# Patient Record
Sex: FEMALE | Race: WHITE | NOT HISPANIC OR LATINO | URBAN - METROPOLITAN AREA
[De-identification: names, ages, dates, MRNs, and addresses within clinical notes are randomized per-mention and may not be internally consistent; named-entity substitution may affect disease eponyms.]

---

## 2018-01-29 ENCOUNTER — INPATIENT (INPATIENT)
Facility: HOSPITAL | Age: 83
LOS: 1 days | Discharge: HOME | DRG: 690 | End: 2018-01-31
Attending: INTERNAL MEDICINE | Admitting: INTERNAL MEDICINE
Payer: MEDICARE

## 2018-01-29 VITALS
DIASTOLIC BLOOD PRESSURE: 53 MMHG | OXYGEN SATURATION: 96 % | HEART RATE: 66 BPM | RESPIRATION RATE: 16 BRPM | SYSTOLIC BLOOD PRESSURE: 146 MMHG

## 2018-01-29 DIAGNOSIS — N39.0 URINARY TRACT INFECTION, SITE NOT SPECIFIED: ICD-10-CM

## 2018-01-29 LAB
ALBUMIN SERPL ELPH-MCNC: 4.2 G/DL — SIGNIFICANT CHANGE UP (ref 3.3–5)
ALP SERPL-CCNC: 174 U/L — HIGH (ref 40–120)
ALT FLD-CCNC: 14 U/L RC — SIGNIFICANT CHANGE UP (ref 10–45)
ANION GAP SERPL CALC-SCNC: 15 MMOL/L — SIGNIFICANT CHANGE UP (ref 5–17)
APPEARANCE UR: ABNORMAL
APTT BLD: 31.5 SEC — SIGNIFICANT CHANGE UP (ref 27.5–37.4)
AST SERPL-CCNC: 20 U/L — SIGNIFICANT CHANGE UP (ref 10–40)
BACTERIA # UR AUTO: ABNORMAL /HPF
BASOPHILS # BLD AUTO: 0 K/UL — SIGNIFICANT CHANGE UP (ref 0–0.2)
BASOPHILS NFR BLD AUTO: 0.4 % — SIGNIFICANT CHANGE UP (ref 0–2)
BILIRUB SERPL-MCNC: 0.8 MG/DL — SIGNIFICANT CHANGE UP (ref 0.2–1.2)
BILIRUB UR-MCNC: NEGATIVE — SIGNIFICANT CHANGE UP
BUN SERPL-MCNC: 29 MG/DL — HIGH (ref 7–23)
CALCIUM SERPL-MCNC: 9.6 MG/DL — SIGNIFICANT CHANGE UP (ref 8.4–10.5)
CHLORIDE SERPL-SCNC: 102 MMOL/L — SIGNIFICANT CHANGE UP (ref 96–108)
CK MB CFR SERPL CALC: 2 NG/ML — SIGNIFICANT CHANGE UP (ref 0–3.8)
CK SERPL-CCNC: 65 U/L — SIGNIFICANT CHANGE UP (ref 25–170)
CO2 SERPL-SCNC: 27 MMOL/L — SIGNIFICANT CHANGE UP (ref 22–31)
COLOR SPEC: YELLOW — SIGNIFICANT CHANGE UP
CREAT SERPL-MCNC: 1.42 MG/DL — HIGH (ref 0.5–1.3)
DIFF PNL FLD: ABNORMAL
EOSINOPHIL # BLD AUTO: 0.1 K/UL — SIGNIFICANT CHANGE UP (ref 0–0.5)
EOSINOPHIL NFR BLD AUTO: 1.1 % — SIGNIFICANT CHANGE UP (ref 0–6)
EPI CELLS # UR: SIGNIFICANT CHANGE UP /HPF
GLUCOSE SERPL-MCNC: 86 MG/DL — SIGNIFICANT CHANGE UP (ref 70–99)
GLUCOSE UR QL: NEGATIVE — SIGNIFICANT CHANGE UP
HCT VFR BLD CALC: 37.9 % — SIGNIFICANT CHANGE UP (ref 34.5–45)
HGB BLD-MCNC: 13.5 G/DL — SIGNIFICANT CHANGE UP (ref 11.5–15.5)
INR BLD: 1.06 RATIO — SIGNIFICANT CHANGE UP (ref 0.88–1.16)
KETONES UR-MCNC: NEGATIVE — SIGNIFICANT CHANGE UP
LEUKOCYTE ESTERASE UR-ACNC: ABNORMAL
LYMPHOCYTES # BLD AUTO: 1.5 K/UL — SIGNIFICANT CHANGE UP (ref 1–3.3)
LYMPHOCYTES # BLD AUTO: 19.7 % — SIGNIFICANT CHANGE UP (ref 13–44)
MCHC RBC-ENTMCNC: 34.7 PG — HIGH (ref 27–34)
MCHC RBC-ENTMCNC: 35.6 GM/DL — SIGNIFICANT CHANGE UP (ref 32–36)
MCV RBC AUTO: 97.3 FL — SIGNIFICANT CHANGE UP (ref 80–100)
MONOCYTES # BLD AUTO: 0.6 K/UL — SIGNIFICANT CHANGE UP (ref 0–0.9)
MONOCYTES NFR BLD AUTO: 8.1 % — SIGNIFICANT CHANGE UP (ref 2–14)
NEUTROPHILS # BLD AUTO: 5.5 K/UL — SIGNIFICANT CHANGE UP (ref 1.8–7.4)
NEUTROPHILS NFR BLD AUTO: 70.6 % — SIGNIFICANT CHANGE UP (ref 43–77)
NITRITE UR-MCNC: POSITIVE
PH UR: 6 — SIGNIFICANT CHANGE UP (ref 5–8)
PLATELET # BLD AUTO: 248 K/UL — SIGNIFICANT CHANGE UP (ref 150–400)
POTASSIUM SERPL-MCNC: 4.4 MMOL/L — SIGNIFICANT CHANGE UP (ref 3.5–5.3)
POTASSIUM SERPL-SCNC: 4.4 MMOL/L — SIGNIFICANT CHANGE UP (ref 3.5–5.3)
PROT SERPL-MCNC: 7.8 G/DL — SIGNIFICANT CHANGE UP (ref 6–8.3)
PROT UR-MCNC: 30 MG/DL
PROTHROM AB SERPL-ACNC: 11.6 SEC — SIGNIFICANT CHANGE UP (ref 9.8–12.7)
RBC # BLD: 3.89 M/UL — SIGNIFICANT CHANGE UP (ref 3.8–5.2)
RBC # FLD: 12 % — SIGNIFICANT CHANGE UP (ref 10.3–14.5)
RBC CASTS # UR COMP ASSIST: ABNORMAL /HPF (ref 0–2)
SODIUM SERPL-SCNC: 144 MMOL/L — SIGNIFICANT CHANGE UP (ref 135–145)
SP GR SPEC: 1.02 — SIGNIFICANT CHANGE UP (ref 1.01–1.02)
TROPONIN T SERPL-MCNC: <0.01 NG/ML — SIGNIFICANT CHANGE UP (ref 0–0.06)
UROBILINOGEN FLD QL: NEGATIVE — SIGNIFICANT CHANGE UP
WBC # BLD: 7.8 K/UL — SIGNIFICANT CHANGE UP (ref 3.8–10.5)
WBC # FLD AUTO: 7.8 K/UL — SIGNIFICANT CHANGE UP (ref 3.8–10.5)
WBC UR QL: >50 /HPF (ref 0–5)

## 2018-01-29 PROCEDURE — 70450 CT HEAD/BRAIN W/O DYE: CPT | Mod: 26

## 2018-01-29 PROCEDURE — 93010 ELECTROCARDIOGRAM REPORT: CPT

## 2018-01-29 PROCEDURE — 99053 MED SERV 10PM-8AM 24 HR FAC: CPT

## 2018-01-29 PROCEDURE — 73630 X-RAY EXAM OF FOOT: CPT | Mod: 26,RT

## 2018-01-29 PROCEDURE — 99285 EMERGENCY DEPT VISIT HI MDM: CPT | Mod: 25

## 2018-01-29 PROCEDURE — 72170 X-RAY EXAM OF PELVIS: CPT | Mod: 26

## 2018-01-29 PROCEDURE — 71045 X-RAY EXAM CHEST 1 VIEW: CPT | Mod: 26

## 2018-01-29 RX ORDER — CEFTRIAXONE 500 MG/1
1 INJECTION, POWDER, FOR SOLUTION INTRAMUSCULAR; INTRAVENOUS EVERY 24 HOURS
Qty: 0 | Refills: 0 | Status: DISCONTINUED | OUTPATIENT
Start: 2018-01-29 | End: 2018-01-29

## 2018-01-29 RX ORDER — MIRTAZAPINE 45 MG/1
7.5 TABLET, ORALLY DISINTEGRATING ORAL AT BEDTIME
Qty: 0 | Refills: 0 | Status: DISCONTINUED | OUTPATIENT
Start: 2018-01-29 | End: 2018-01-31

## 2018-01-29 RX ORDER — ASPIRIN/CALCIUM CARB/MAGNESIUM 324 MG
0 TABLET ORAL
Qty: 0 | Refills: 0 | COMMUNITY

## 2018-01-29 RX ORDER — CARBIDOPA AND LEVODOPA 25; 100 MG/1; MG/1
1 TABLET ORAL
Qty: 0 | Refills: 0 | COMMUNITY

## 2018-01-29 RX ORDER — ASPIRIN/CALCIUM CARB/MAGNESIUM 324 MG
81 TABLET ORAL DAILY
Qty: 0 | Refills: 0 | Status: DISCONTINUED | OUTPATIENT
Start: 2018-01-29 | End: 2018-01-31

## 2018-01-29 RX ORDER — IPRATROPIUM/ALBUTEROL SULFATE 18-103MCG
0 AEROSOL WITH ADAPTER (GRAM) INHALATION
Qty: 0 | Refills: 0 | COMMUNITY

## 2018-01-29 RX ORDER — RIVAROXABAN 15 MG-20MG
0 KIT ORAL
Qty: 0 | Refills: 0 | COMMUNITY

## 2018-01-29 RX ORDER — CEFTRIAXONE 500 MG/1
1 INJECTION, POWDER, FOR SOLUTION INTRAMUSCULAR; INTRAVENOUS ONCE
Qty: 0 | Refills: 0 | Status: DISCONTINUED | OUTPATIENT
Start: 2018-01-29 | End: 2018-01-29

## 2018-01-29 RX ORDER — SODIUM CHLORIDE 9 MG/ML
500 INJECTION INTRAMUSCULAR; INTRAVENOUS; SUBCUTANEOUS ONCE
Qty: 0 | Refills: 0 | Status: COMPLETED | OUTPATIENT
Start: 2018-01-29 | End: 2018-01-29

## 2018-01-29 RX ORDER — CARBIDOPA AND LEVODOPA 25; 100 MG/1; MG/1
1 TABLET ORAL DAILY
Qty: 0 | Refills: 0 | Status: DISCONTINUED | OUTPATIENT
Start: 2018-01-29 | End: 2018-01-31

## 2018-01-29 RX ORDER — RIVAROXABAN 15 MG-20MG
15 KIT ORAL EVERY 24 HOURS
Qty: 0 | Refills: 0 | Status: DISCONTINUED | OUTPATIENT
Start: 2018-01-29 | End: 2018-01-31

## 2018-01-29 RX ORDER — CEFTRIAXONE 500 MG/1
1 INJECTION, POWDER, FOR SOLUTION INTRAMUSCULAR; INTRAVENOUS ONCE
Qty: 0 | Refills: 0 | Status: COMPLETED | OUTPATIENT
Start: 2018-01-29 | End: 2018-01-29

## 2018-01-29 RX ORDER — SIMVASTATIN 20 MG/1
0 TABLET, FILM COATED ORAL
Qty: 0 | Refills: 0 | COMMUNITY

## 2018-01-29 RX ORDER — CEFTRIAXONE 500 MG/1
1 INJECTION, POWDER, FOR SOLUTION INTRAMUSCULAR; INTRAVENOUS EVERY 24 HOURS
Qty: 0 | Refills: 0 | Status: DISCONTINUED | OUTPATIENT
Start: 2018-01-29 | End: 2018-01-31

## 2018-01-29 RX ORDER — DONEPEZIL HYDROCHLORIDE 10 MG/1
0 TABLET, FILM COATED ORAL
Qty: 0 | Refills: 0 | COMMUNITY

## 2018-01-29 RX ORDER — MULTIVIT-MIN/FERROUS GLUCONATE 9 MG/15 ML
1 LIQUID (ML) ORAL
Qty: 0 | Refills: 0 | COMMUNITY

## 2018-01-29 RX ORDER — SIMVASTATIN 20 MG/1
10 TABLET, FILM COATED ORAL AT BEDTIME
Qty: 0 | Refills: 0 | Status: DISCONTINUED | OUTPATIENT
Start: 2018-01-29 | End: 2018-01-31

## 2018-01-29 RX ORDER — ATENOLOL 25 MG/1
50 TABLET ORAL DAILY
Qty: 0 | Refills: 0 | Status: DISCONTINUED | OUTPATIENT
Start: 2018-01-29 | End: 2018-01-31

## 2018-01-29 RX ADMIN — SODIUM CHLORIDE 500 MILLILITER(S): 9 INJECTION INTRAMUSCULAR; INTRAVENOUS; SUBCUTANEOUS at 08:51

## 2018-01-29 RX ADMIN — SIMVASTATIN 10 MILLIGRAM(S): 20 TABLET, FILM COATED ORAL at 21:42

## 2018-01-29 RX ADMIN — MIRTAZAPINE 7.5 MILLIGRAM(S): 45 TABLET, ORALLY DISINTEGRATING ORAL at 22:55

## 2018-01-29 RX ADMIN — RIVAROXABAN 15 MILLIGRAM(S): KIT at 17:05

## 2018-01-29 RX ADMIN — CEFTRIAXONE 100 GRAM(S): 500 INJECTION, POWDER, FOR SOLUTION INTRAMUSCULAR; INTRAVENOUS at 08:51

## 2018-01-29 RX ADMIN — Medication 81 MILLIGRAM(S): at 10:40

## 2018-01-29 NOTE — ED PROVIDER NOTE - NEUROLOGICAL LEVEL OF CONSCIOUSNESS
CONFUSED/follows commands/alert eyes closed except for painful stimuli but answering questions./CONFUSED/follows commands/alert

## 2018-01-29 NOTE — ED ADULT NURSE NOTE - OBJECTIVE STATEMENT
84 yo female presents to ED by EMS for unwitnessed fall. EMS reports reveals that patient was found on the floor at [facility] facility. Patient has no recollection of events. Patient is oriented to person and place but disoriented to time and situation. On initial assessment she denies chest pain, neck pain, back pain, and pain in the joints and extremities. SpO2 >95% on room air. Abdomen is soft non tender non distended. Pelvis is stable. Urine is yellow and malodorous. Skin is warm and dry. Patient is afebrile rectally. There are  no apparent signs of head injury. Patient has bilateral strength and sensation in all extremities. There is a small amount of dried blood to the right tie and bilateral scabs to knees. There is blanchable redness to the sacrum. VSS/ NAD. Safety and comfort maintained. Glucose and EKG completed at bedside, WNL. Will continue to monitor.

## 2018-01-29 NOTE — ED PROVIDER NOTE - ATTENDING CONTRIBUTION TO CARE
MD Greene:  patient seen and evaluated with the resident.  I was present for key portions of the History & Physical, and I agree with the Impression & Plan.  MD Greene:  84 yo F, bib EMS after being found down at the [facility] between bed and wall.  Unk onset/down-time.  Patient AAOx3 at baseline by EMS reports, slow to respond today in ED, but following commands.  Quality:  sleepy-appearing.  Better/worse:  n/a.  Intensity:  7/10.  VS - wnl.  Physical Exam: elderly F, cachectic, NCAT, neck w/o midline ttp, CTA B, RRR, Abd:  s/nd/nt, Ext:  no edema, Skin:  +skin tear on R great toe.  ECG - unremarkable.  Impression:  possible fall on thrombin inhibitor, possible urosepsis, but normal VS.  No obvious significant injury.  Plan:  labs, Joshua, CT head, cxr, pelvis xr, ua, reassess.

## 2018-01-29 NOTE — H&P ADULT - NSHPREVIEWOFSYSTEMS_GEN_ALL_CORE
REVIEW OF SYSTEMS:    CONSTITUTIONA weakness, no  fevers or chills  EYES/ENT: No visual changes;    NECK: No pain   RESPIRATORY: No cough, wheezing, hemoptysis; No shortness of breath  CARDIOVASCULAR: No chest pain or palpitations  GASTROINTESTINAL: No abdominal or epigastric pain. No nausea, vomiting, or hematemesis; No diarrhea or constipation.       No  melena   ,  no      brbpr  GENITOURINARY: No dysuria, frequency   NEUROLOGICAL: No  focal  weakness  SKIN   No  rash  PSYCH    Alert

## 2018-01-29 NOTE — ED ADULT NURSE REASSESSMENT NOTE - NS ED NURSE REASSESS COMMENT FT1
patient is sleeping in bed comfortably. VSS/ NAD. Ceftriaxone infusion complete. Will continue to monitor.

## 2018-01-29 NOTE — ED PROVIDER NOTE - MEDICAL DECISION MAKING DETAILS
85 year old female past medical history A-fib on Xarelto, COPD, Parkinson's, constipation, presents to the ED for weakness. Increasing weakness. Unclear if fever prior but afebrile here. Found down by staff, with unknown downtime. Unknown if head trauma but no outward signs of trauma. Axox2, normally axox3. Will obtain labwork, ekg and cardiac enzymes to evaluate for cardiac etiology, UA for UTI as source of confusion, CT head for trauma, finger stick. Xray chest, pelvis and foot.

## 2018-01-29 NOTE — H&P ADULT - HISTORY OF PRESENT ILLNESS
85 year old female past medical history A-fib on xaralto, COPD, Parkinsons, constipation, presents to the ED for weakness. As per EMS patient has been weak for the past few days. Patient was found on floor today by staff this morning between her bed and the nightstand, sitting on her knees. Patient reports no pain but she does not remember falling. She is axox2, normally axox3 as per son after discussion on phone. seen in er, ct  head, no  cva    primary medical doctor: Lisbet Aden [phone]  Son: Jai [phone]

## 2018-01-29 NOTE — ED PROVIDER NOTE - PROGRESS NOTE DETAILS
discussed with Dr. Langford from Dr. Aden office, wanted Fallon for admission, fallon advised to admit to Dr. rahman.    Also discussed with son, who will be in hospital soon. Patient with improving mental status now less confused, opening eyes to verbal stimuli.

## 2018-01-29 NOTE — H&P ADULT - NSHPPHYSICALEXAM_GEN_ALL_CORE
PHYSICAL EXAMINATION:  Vital Signs Last 24 Hrs  T(C): 36.8 (29 Jan 2018 07:04), Max: 36.8 (29 Jan 2018 07:04)  T(F): 98.3 (29 Jan 2018 07:04), Max: 98.3 (29 Jan 2018 07:04)  HR: 51 (29 Jan 2018 09:00) (51 - 66)  BP: 131/50 (29 Jan 2018 09:00) (127/49 - 146/53)  BP(mean): --  RR: 15 (29 Jan 2018 09:00) (15 - 16)  SpO2: 98% (29 Jan 2018 09:00) (96% - 98%)  CAPILLARY BLOOD GLUCOSE      POCT Blood Glucose.: 93 mg/dL (29 Jan 2018 07:14)        GENERAL: NAD, well-groomed,  HEAD:  atraumatic, normocephalic  EYES: sclera anicteric  ENMT: mucous membranes moist  NECK: supple, No JVD  CHEST/LUNG: clear to auscultation bilaterally;    no      rales   , rhonchi,   HEART: normal S1, S2  ABDOMEN: BS+, soft, ND, NT   EXTREMITIES:  pulses palpable; no clubbing, cyanosis, or edema b/l LEs  NEURO: awake,  bit  confused  SKIN: no     rash

## 2018-01-29 NOTE — ED PROVIDER NOTE - OBJECTIVE STATEMENT
85 year old female past medical history A-fib on xaralto, COPD, Parkinsons, constipation, presents to the ED for weakness. As per EMS patient has been weak for the past few days. Patient was found on floor today by staff this morning between her bed and the nightstand, sitting on her knees. Patient reports no pain but she does not remember falling. She is axox2, normally axox3 as per son after discussion on phone.     primary medical doctor: Lisbet Aden [phone]  Son: Jai [phone] 85 year old female past medical history A-fib on xaralto, COPD, Parkinsons, constipation, presents to the ED for weakness. As per EMS patient has been weak for the past few days. Patient was found on floor today by staff this morning between her bed and the nightstand, sitting on her knees. Patient reports no pain but she does not remember falling. She is axox2, normally axox3 as per son after discussion on phone.     primary medical doctor: Lisbet Aden [phone]   Son: Jai [phone]

## 2018-01-29 NOTE — H&P ADULT - ASSESSMENT
pt  with  afib/ xarelto, dementia,   admitted  with a fall  had  generalized  weakness for past few  days   rvp, pending   now with uti, on rocephin   pt  eval  labs, stable pt  with  afib/ xarelto, dementia,   admitted  with a fall  had  generalized  weakness for past few  days   rvp, pending   now with uti, on rocephin   pt  eval  labs, stable  aide at bedside

## 2018-01-29 NOTE — H&P ADULT - NSHPLABSRESULTS_GEN_ALL_CORE
LABS:                        13.5   7.8   )-----------( 248      ( 2018 07:29 )             37.9         144  |  102  |  29<H>  ----------------------------<  86  4.4   |  27  |  1.42<H>    Ca    9.6      2018 07:29    TPro  7.8  /  Alb  4.2  /  TBili  0.8  /  DBili  x   /  AST  20  /  ALT  14  /  AlkPhos  174<H>      PT/INR - ( 2018 07:29 )   PT: 11.6 sec;   INR: 1.06 ratio         PTT - ( 2018 07:29 )  PTT:31.5 sec  Urinalysis Basic - ( 2018 07:29 )    Color: Yellow / Appearance: SL Turbid / S.019 / pH: x  Gluc: x / Ketone: Negative  / Bili: Negative / Urobili: Negative   Blood: x / Protein: 30 mg/dL / Nitrite: Positive   Leuk Esterase: Large / RBC: 10-25 /HPF / WBC >50 /HPF   Sq Epi: x / Non Sq Epi: Occasional /HPF / Bacteria: Moderate /HPF          RADIOLOGY & ADDITIONAL TESTS:

## 2018-01-30 ENCOUNTER — TRANSCRIPTION ENCOUNTER (OUTPATIENT)
Age: 83
End: 2018-01-30

## 2018-01-30 LAB
ANION GAP SERPL CALC-SCNC: 14 MMOL/L — SIGNIFICANT CHANGE UP (ref 5–17)
BUN SERPL-MCNC: 28 MG/DL — HIGH (ref 7–23)
CALCIUM SERPL-MCNC: 9.3 MG/DL — SIGNIFICANT CHANGE UP (ref 8.4–10.5)
CHLORIDE SERPL-SCNC: 108 MMOL/L — SIGNIFICANT CHANGE UP (ref 96–108)
CO2 SERPL-SCNC: 22 MMOL/L — SIGNIFICANT CHANGE UP (ref 22–31)
CREAT SERPL-MCNC: 1.2 MG/DL — SIGNIFICANT CHANGE UP (ref 0.5–1.3)
GLUCOSE SERPL-MCNC: 84 MG/DL — SIGNIFICANT CHANGE UP (ref 70–99)
HCT VFR BLD CALC: 39.2 % — SIGNIFICANT CHANGE UP (ref 34.5–45)
HGB BLD-MCNC: 13 G/DL — SIGNIFICANT CHANGE UP (ref 11.5–15.5)
MAGNESIUM SERPL-MCNC: 2.1 MG/DL — SIGNIFICANT CHANGE UP (ref 1.6–2.6)
MCHC RBC-ENTMCNC: 32.1 PG — SIGNIFICANT CHANGE UP (ref 27–34)
MCHC RBC-ENTMCNC: 33.2 GM/DL — SIGNIFICANT CHANGE UP (ref 32–36)
MCV RBC AUTO: 96.8 FL — SIGNIFICANT CHANGE UP (ref 80–100)
PLATELET # BLD AUTO: 220 K/UL — SIGNIFICANT CHANGE UP (ref 150–400)
POTASSIUM SERPL-MCNC: 5.2 MMOL/L — SIGNIFICANT CHANGE UP (ref 3.5–5.3)
POTASSIUM SERPL-SCNC: 5.2 MMOL/L — SIGNIFICANT CHANGE UP (ref 3.5–5.3)
RBC # BLD: 4.05 M/UL — SIGNIFICANT CHANGE UP (ref 3.8–5.2)
RBC # FLD: 14.8 % — HIGH (ref 10.3–14.5)
SODIUM SERPL-SCNC: 144 MMOL/L — SIGNIFICANT CHANGE UP (ref 135–145)
WBC # BLD: 8.07 K/UL — SIGNIFICANT CHANGE UP (ref 3.8–10.5)
WBC # FLD AUTO: 8.07 K/UL — SIGNIFICANT CHANGE UP (ref 3.8–10.5)

## 2018-01-30 RX ADMIN — SIMVASTATIN 10 MILLIGRAM(S): 20 TABLET, FILM COATED ORAL at 22:12

## 2018-01-30 RX ADMIN — CEFTRIAXONE 100 GRAM(S): 500 INJECTION, POWDER, FOR SOLUTION INTRAMUSCULAR; INTRAVENOUS at 09:09

## 2018-01-30 RX ADMIN — MIRTAZAPINE 7.5 MILLIGRAM(S): 45 TABLET, ORALLY DISINTEGRATING ORAL at 22:12

## 2018-01-30 RX ADMIN — RIVAROXABAN 15 MILLIGRAM(S): KIT at 19:04

## 2018-01-30 RX ADMIN — ATENOLOL 50 MILLIGRAM(S): 25 TABLET ORAL at 06:45

## 2018-01-30 RX ADMIN — Medication 81 MILLIGRAM(S): at 17:51

## 2018-01-30 RX ADMIN — CARBIDOPA AND LEVODOPA 1 TABLET(S): 25; 100 TABLET ORAL at 17:51

## 2018-01-30 NOTE — DISCHARGE NOTE ADULT - HOSPITAL COURSE
85 year old female past medical history A-fib on xarelto, COPD, Parkinsons, constipation, presents to the ED for weakness. As per EMS, patient has been weak for the past few days. Patient was found on floor today by staff this morning between her bed and the night stand, sitting on her knees.  Patient reports no pain but she does not remember falling. She is axox2, normally axox3 as per son after discussion on phone.    1/29/18 - Head CT - No evidence of acute hemorrhage, mass or mass effect.                Xray pelvis - No acute fracture is demonstrated                Xray right foot - No acute fracture or radiopaque retained foreign body                Chest Xray - The heart is slightly enlarged. The lungs are clear. The visualized bones are normal for the patient's age. 85 year old female past medical history A-fib on xarelto, COPD, Parkinsons, constipation, presents to the ED for weakness. As per EMS, patient has been weak for the past few days. Patient was found on floor today by staff this morning between her bed and the night stand, sitting on her knees.  Patient reports no pain but she does not remember falling. She is axox2, normally axox3 as per son after discussion on phone.    1/29/18 - Head CT - No evidence of acute hemorrhage, mass or mass effect.                Xray pelvis - No acute fracture is demonstrated                Xray right foot - No acute fracture or radiopaque retained foreign body                Chest Xray - The heart is slightly enlarged. The lungs are clear. The visualized bones are normal for the patient's age.                Urinalysis - positive                Urine culture - >100,000 E.Coli 85 year old female past medical history A-fib on xarelto, COPD, Parkinsons, constipation, presents to the ED for weakness. As per EMS, patient has been weak for the past few days. Patient was found on floor today by staff this morning between her bed and the night stand, sitting on her knees.  Patient reports no pain but she does not remember falling. She is axox2, normally axox3 as per son after discussion on phone.    1/29/18 - Head CT - No evidence of acute hemorrhage, mass or mass effect.                Xray pelvis - No acute fracture is demonstrated                Xray right foot - No acute fracture or radiopaque retained foreign body                Chest Xray - The heart is slightly enlarged. The lungs are clear. The visualized bones are normal for the patient's age.                Urinalysis - positive                Urine culture - >100,000 E.Coli  1/31/18 - Patient ready for discharge with Cipro x one more day and follow up with PMD as outpatient.

## 2018-01-30 NOTE — DISCHARGE NOTE ADULT - PLAN OF CARE
Symptoms improved You are being discharged on an antibiotic (Cipro) x one more day.  HOME CARE INSTRUCTIONS  If you were prescribed antibiotics, take them exactly as your caregiver instructs you. Finish the medication even if you feel better after you have only taken some of the medication.  Drink enough water and fluids to keep your urine clear or pale yellow.  Avoid caffeine, tea, and carbonated beverages. They tend to irritate your bladder.  Empty your bladder often. Avoid holding urine for long periods of time.  Empty your bladder before and after sexual intercourse.  After a bowel movement, women should cleanse from front to back. Use each tissue only once.  SEEK MEDICAL CARE IF:  You have back pain.  You develop a fever.  Your symptoms do not begin to resolve within 3 days.  SEEK IMMEDIATE MEDICAL CARE IF:  You have severe back pain or lower abdominal pain.  You develop chills.  You have nausea or vomiting.  You have continued burning or discomfort with urination. Fall precautions.  Continue with physical therapy, as patient was receiving prior to admission. Atrial fibrillation is the most common heart rhythm problem.  The condition puts you at risk for has stroke and heart attack  It helps if you control your blood pressure, not drink more than 1-2 alcohol drinks per day, cut down on caffeine, getting treatment for over active thyroid gland, and get regular exercise  Call your doctor if you feel your heart racing or beating unusually, chest tightness or pain, lightheaded, faint, shortness of breath especially with exercise  It is important to take your heart medication as prescribed  You may be on anticoagulation which is very important to take as directed - you may need blood work to monitor drug levels You will need to follow up with your primary medical doctor within one week of discharge.  Continue with home medication regimen. Continue with current medication regimen.

## 2018-01-30 NOTE — PROGRESS NOTE ADULT - ASSESSMENT
pt  with  afib/ xarelto, dementia, parjinsons   admitted  with a fall, no  syncope  had  generalized  weakness for past few  days   now with uti, on rocephin  ID called   pt  eval  labs, stable  pt eval, pending

## 2018-01-30 NOTE — PROGRESS NOTE ADULT - SUBJECTIVE AND OBJECTIVE BOX
resting, no  cp/sob/ abd pain    MEDICATIONS  (STANDING):  aspirin enteric coated 81 milliGRAM(s) Oral daily  ATENolol  Tablet 50 milliGRAM(s) Oral daily  carbidopa/levodopa  25/250 1 Tablet(s) Oral daily  cefTRIAXone   IVPB 1 Gram(s) IV Intermittent every 24 hours  mirtazapine 7.5 milliGRAM(s) Oral at bedtime  rivaroxaban 15 milliGRAM(s) Oral every 24 hours  simvastatin 10 milliGRAM(s) Oral at bedtime    MEDICATIONS  (PRN):      Vital Signs Last 24 Hrs  T(C): 36.4 (2018 03:09), Max: 37.2 (2018 21:55)  T(F): 97.6 (2018 03:09), Max: 99 (2018 21:55)  HR: 71 (2018 03:09) (56 - 71)  BP: 166/75 (2018 03:09) (99/51 - 166/75)  BP(mean): --  RR: 18 (2018 03:09) (17 - 18)  SpO2: 100% (2018 03:09) (92% - 100%)  CAPILLARY BLOOD GLUCOSE        I&O's Summary    2018 07:01  -  2018 12:07  --------------------------------------------------------  IN: 50 mL / OUT: 0 mL / NET: 50 mL        PHYSICAL EXAM:  HEAD:  Atraumatic, Normocephalic  NECK: Supple, No JVD  CHEST/LUNG: Clear to auscultation bilaterally; No wheeze  HEART: Regular rate and rhythm;           murmur  ABDOMEN: Soft, Nontender, ;   EXTREMITIES:              edema  NEUROLOGY:  alert    LABS:                        13.0   8.07  )-----------( 220      ( 2018 09:07 )             39.2         144  |  108  |  28<H>  ----------------------------<  84  5.2   |  22  |  1.20    Ca    9.3      2018 09:03  Mg     2.1     01-30    TPro  7.8  /  Alb  4.2  /  TBili  0.8  /  DBili  x   /  AST  20  /  ALT  14  /  AlkPhos  174<H>      PT/INR - ( 2018 07:29 )   PT: 11.6 sec;   INR: 1.06 ratio         PTT - ( 2018 07:29 )  PTT:31.5 sec  CARDIAC MARKERS ( 2018 07:29 )  x     / <0.01 ng/mL / 65 U/L / x     / 2.0 ng/mL      Urinalysis Basic - ( 2018 07:29 )    Color: Yellow / Appearance: SL Turbid / S.019 / pH: x  Gluc: x / Ketone: Negative  / Bili: Negative / Urobili: Negative   Blood: x / Protein: 30 mg/dL / Nitrite: Positive   Leuk Esterase: Large / RBC: 10-25 /HPF / WBC >50 /HPF   Sq Epi: x / Non Sq Epi: Occasional /HPF / Bacteria: Moderate /HPF                  Consultant(s) Notes Reviewed:      Care Discussed with Consultants/Other Providers:

## 2018-01-30 NOTE — DISCHARGE NOTE ADULT - CARE PLAN
Principal Discharge DX:	UTI (urinary tract infection)  Goal:	Symptoms improved  Assessment and plan of treatment:	You are being discharged on an antibiotic (Cipro) x one more day.  HOME CARE INSTRUCTIONS  If you were prescribed antibiotics, take them exactly as your caregiver instructs you. Finish the medication even if you feel better after you have only taken some of the medication.  Drink enough water and fluids to keep your urine clear or pale yellow.  Avoid caffeine, tea, and carbonated beverages. They tend to irritate your bladder.  Empty your bladder often. Avoid holding urine for long periods of time.  Empty your bladder before and after sexual intercourse.  After a bowel movement, women should cleanse from front to back. Use each tissue only once.  SEEK MEDICAL CARE IF:  You have back pain.  You develop a fever.  Your symptoms do not begin to resolve within 3 days.  SEEK IMMEDIATE MEDICAL CARE IF:  You have severe back pain or lower abdominal pain.  You develop chills.  You have nausea or vomiting.  You have continued burning or discomfort with urination.  Secondary Diagnosis:	Fall  Assessment and plan of treatment:	Fall precautions.  Continue with physical therapy, as patient was receiving prior to admission.  Secondary Diagnosis:	Afib  Assessment and plan of treatment:	Atrial fibrillation is the most common heart rhythm problem.  The condition puts you at risk for has stroke and heart attack  It helps if you control your blood pressure, not drink more than 1-2 alcohol drinks per day, cut down on caffeine, getting treatment for over active thyroid gland, and get regular exercise  Call your doctor if you feel your heart racing or beating unusually, chest tightness or pain, lightheaded, faint, shortness of breath especially with exercise  It is important to take your heart medication as prescribed  You may be on anticoagulation which is very important to take as directed - you may need blood work to monitor drug levels  Secondary Diagnosis:	COPD (chronic obstructive pulmonary disease)  Assessment and plan of treatment:	You will need to follow up with your primary medical doctor within one week of discharge.  Continue with home medication regimen.  Secondary Diagnosis:	Parkinsons disease  Assessment and plan of treatment:	Continue with current medication regimen.

## 2018-01-30 NOTE — CONSULT NOTE ADULT - SUBJECTIVE AND OBJECTIVE BOX
Patient is a 85y old  Female who presents with a chief complaint of weakness/fall (2018 09:40)      HPI:  85 year old female past medical history A-fib on xaralto, COPD, Parkinsons, constipation, presents to the ED for weakness. As per EMS patient has been weak for the past few days. Patient was found on floor today by staff this morning between her bed and the nightstand, sitting on her knees. Patient reports no pain but she does not remember falling. She is axox2, normally axox3 as per son after discussion on phone. seen in er, ct  head, no  cva    primary medical doctor: Lisbet Aden [phone]  Son: Jai [phone] (2018 09:40)           *****PAST MEDICAL / Surgical  HISTORY:  PAST MEDICAL & SURGICAL HISTORY:           *****FAMILY HISTORY:  FAMILY HISTORY:           *****SOCIAL HISTORY:  Alcohol: None  Smoking: None         *****ALLERGIES:   Allergies    No Known Allergies    Intolerances             *****MEDICATIONS:  MEDICATIONS  (STANDING):  aspirin enteric coated 81 milliGRAM(s) Oral daily  ATENolol  Tablet 50 milliGRAM(s) Oral daily  carbidopa/levodopa  25/250 1 Tablet(s) Oral daily  cefTRIAXone   IVPB 1 Gram(s) IV Intermittent every 24 hours  mirtazapine 7.5 milliGRAM(s) Oral at bedtime  rivaroxaban 15 milliGRAM(s) Oral every 24 hours  simvastatin 10 milliGRAM(s) Oral at bedtime    MEDICATIONS  (PRN):           *****REVIEW OF SYSTEM:  GEN: no fever, no chills, no pain  RESP: no SOB, no cough, no sputum  CVS: no chest pain, no palpitations, no edema  GI: no abdominal pain, no nausea, no vomiting, no constipation, no diarrhea  : no dysurea, no frequency, no hematurea  Neuro: no headache, no dizziness  PSYCH: no anxiety, no depression  Derm : no itching, no rash         *****VITAL SIGNS:  T(C): 36.4 (18 @ 03:09), Max: 37.2 (18 @ 21:55)  HR: 71 (18 @ 03:09) (56 - 71)  BP: 166/75 (18 @ 03:09) (99/51 - 166/75)  RR: 18 (18 @ 03:09) (16 - 18)  SpO2: 100% (18 @ 03:09) (92% - 100%)  Wt(kg): --           *****PHYSICAL EXAM:  GEN: A&O X 3 , NAD , comfortable  HEENT: NCAT, PERRL, MMM, hearing intact  Neck: supple , no JVD  CVS: S1S2 , regular , No M/R/G appreciated  PULM: CTA B/L,  no W/R/R appreciated  ABD.: soft. non tender, non distended,  bowel sounds present  Extrem: intact pulses , no edema   Derm: No rash , no ecchymoses  PSYCH : normal mood,  no delusion not anxious         *****LAB AND IMAGIN.0   8.07  )-----------( 220      ( 2018 09:07 )             39.2                   144  |  108  |  28<H>  ----------------------------<  84  5.2   |  22  |  1.20    Ca    9.3      2018 09:03  Mg     2.1         TPro  7.8  /  Alb  4.2  /  TBili  0.8  /  DBili  x   /  AST  20  /  ALT  14  /  AlkPhos  174<H>      PT/INR - ( 2018 07:29 )   PT: 11.6 sec;   INR: 1.06 ratio         PTT - ( 2018 07:29 )  PTT:31.5 sec            CARDIAC MARKERS ( 2018 07:29 )  x     / <0.01 ng/mL / 65 U/L / x     / 2.0 ng/mL              Urinalysis Basic - ( 2018 07:29 )    Color: Yellow / Appearance: SL Turbid / S.019 / pH: x  Gluc: x / Ketone: Negative  / Bili: Negative / Urobili: Negative   Blood: x / Protein: 30 mg/dL / Nitrite: Positive   Leuk Esterase: Large / RBC: 10-25 /HPF / WBC >50 /HPF   Sq Epi: x / Non Sq Epi: Occasional /HPF / Bacteria: Moderate /HPF        [All pertinent recent Imaging reports reviewed]         *****A S S E S S M E N T   A N D   P L A N :  85F with past medical history A-fib on xaralto, COPD, Parkinson's who presented with fall, gen weakness.  on abx for uti  HR/BP stable  if becomes tachy consider tele  PT eval  cont AC  titrate BB as needed for HR control  echo  ___________________________  Will follow with you.  Thank you,  ROWDY Jain D.O.
HPI:   Patient is a 85y female with dementia, lives home with aide who was admitted 1 day ago because she fell in her bedroom via unknown mechanism and by report had been weak for a few days prior to admission. She has no fever. She cannot give any information but per aid she is at baseline ms  All other review of systems cannot be obtained    PAST MEDICAL & SURGICAL HISTORY:  dementia  cad  copd,   afib  parkinson    Allergies    No Known Allergies    Intolerances        Antimicrobials Day #  :3  cefTRIAXone   IVPB 1 Gram(s) IV Intermittent every 24 hours    Other Medications:  aspirin enteric coated 81 milliGRAM(s) Oral daily  ATENolol  Tablet 50 milliGRAM(s) Oral daily  carbidopa/levodopa  25/250 1 Tablet(s) Oral daily  mirtazapine 7.5 milliGRAM(s) Oral at bedtime  rivaroxaban 15 milliGRAM(s) Oral every 24 hours  simvastatin 10 milliGRAM(s) Oral at bedtime      FAMILY HISTORY:      SOCIAL HISTORY:  Smoking: [ ]Yes [ x]No  ETOH: [ ]Yes [ x]No  Drug Use: [ ]Yes [ ]xNo   [ x] Single[ ]    T(F): 97.5 (18 @ 14:00), Max: 99 (18 @ 21:55)  HR: 59 (18 @ 14:00)  BP: 116/64 (18 @ 14:00)  RR: 18 (18 @ 14:00)  SpO2: 95% (18 @ 14:00)  Wt(kg): --    PHYSICAL EXAM:  General: sleeping no acute distress  Eyes:  anicteric, no conjunctival injection, no discharge  Oropharynx: no lesions or injection 	  Neck: supple, without adenopathy  Lungs: clear to auscultation  Heart: s1s2 2/6 sys m  Abdomen: soft, nondistended, nontender, without mass or organomegaly  Skin: no lesions  Extremities: no clubbing, cyanosis, or edema  Neurologic: anderson stallings     LAB RESULTS:                        13.0   8.07  )-----------( 220      ( 2018 09:07 )             39.2         144  |  108  |  28<H>  ----------------------------<  84  5.2   |  22  |  1.20    Ca    9.3      2018 09:03  Mg     2.1         TPro  7.8  /  Alb  4.2  /  TBili  0.8  /  DBili  x   /  AST  20  /  ALT  14  /  AlkPhos  174<H>      LIVER FUNCTIONS - ( 2018 07:29 )  Alb: 4.2 g/dL / Pro: 7.8 g/dL / ALK PHOS: 174 U/L / ALT: 14 U/L RC / AST: 20 U/L / GGT: x           Urinalysis Basic - ( 2018 07:29 )    Color: Yellow / Appearance: SL Turbid / S.019 / pH: x  Gluc: x / Ketone: Negative  / Bili: Negative / Urobili: Negative   Blood: x / Protein: 30 mg/dL / Nitrite: Positive   Leuk Esterase: Large / RBC: 10-25 /HPF / WBC >50 /HPF   Sq Epi: x / Non Sq Epi: Occasional /HPF / Bacteria: Moderate /HPF        MICROBIOLOGY:  RECENT CULTURES:   @ 08:52 .Urine Catheterized     >100,000 CFU/ml Escherichia coli            RADIOLOGY REVIEWED:      < from: CT Head No Cont (18 @ 07:56) >    EXAM:  CT BRAIN                            PROCEDURE DATE:  2018            INTERPRETATION:  History: Altered mental status. Found down this morning.    Multiple axial sections were performed from base of skull to vertex   without contrast enhancement. Coronal and sagittal reconstructions were   performed as well.    Parenchymal volume loss and chronic microvascular changes are identified.    There is no evidence of acute hemorrhage, mass or mass effect in the   posterior fossa or supratentorial region    Evaluation of the osseous structures with the appropriate window appears   unremarkable    The visualized paranasal sinuses mastoid and middle ear regions appear   clear    Impression: No evidence of acute hemorrhage, mass or mass effect.      < from: Xray Chest 1 View AP- PORTABLE-Urgent (18 @ 07:23) >  Impression:    The heart is slightly enlarged. The lungs are clear. The visualized bones   are normal for the patient's age.    < end of copied text >      Impression:  Patient with dementia, afib, parkinsons admitted after found on floor via unclear mechanism, report of her being weaker than ususal prior to admission, aid reports baseline ms at present. Patient has pyuria and grows ecoli from urine so she could have cystitis but I don't believe sepsis is the cause for weakness and fall.   Recommendations:  continue ceftriaxone  await organism sensitivity

## 2018-01-30 NOTE — DISCHARGE NOTE ADULT - MEDICATION SUMMARY - MEDICATIONS TO TAKE
I will START or STAY ON the medications listed below when I get home from the hospital:    Aspirin Enteric Coated 81 mg oral delayed release tablet  -- 1 tab(s) by mouth once a day  -- Indication: For Need for prophylactic measure    acetaminophen 325 mg oral tablet  -- 2 tab(s) by mouth every 6 hours, As Needed - for mild pain  -- Indication: For Pain    Xarelto 15 mg oral tablet  -- 1 tab(s) by mouth once a day  -- Indication: For Atrial Fibrillation    mirtazapine 7.5 mg oral tablet  -- 1 tab(s) by mouth once a day (at bedtime)  -- Indication: For Depression    simvastatin 10 mg oral tablet  -- 1 tab(s) by mouth once a day  -- Indication: For Hyperlipidemia    Sinemet 25 mg-250 mg oral tablet  -- 1 tab(s) by mouth once a day (in the morning)  -- Indication: For Parkinsons    atenolol 25 mg oral tablet  -- 2 tab(s) by mouth once a day  -- Indication: For Hypertension    ipratropium-albuterol 0.5 mg-2.5 mg/3 mLinhalation solution  -- 3 milliliter(s) inhaled once a day (in the morning)  -- Indication: For Copd    lactulose 10 g/15 mL oral solution  -- 10 milliliter(s) by mouth once a day (at bedtime), As Needed  -- Indication: For Constipation    ciprofloxacin 250 mg oral tablet  -- 1 tab(s) by mouth 2 times a day  -- Indication: For Urinary tract infection    Thera M oral tablet  -- 1 tab(s) by mouth once a day  -- Indication: For Supplement

## 2018-01-30 NOTE — DISCHARGE NOTE ADULT - CARE PROVIDER_API CALL
My Aguilar), Internal Medicine  [address] [place] [address]  Phone: [phone]  Fax: [phone] Lisbet Aden), Internal Medicine  [address] [place] [address]  Phone: [phone]  Fax: [phone]

## 2018-01-31 VITALS
SYSTOLIC BLOOD PRESSURE: 110 MMHG | DIASTOLIC BLOOD PRESSURE: 63 MMHG | RESPIRATION RATE: 18 BRPM | TEMPERATURE: 98 F | OXYGEN SATURATION: 96 % | HEART RATE: 74 BPM

## 2018-01-31 LAB
-  AMIKACIN: SIGNIFICANT CHANGE UP
-  AMPICILLIN/SULBACTAM: SIGNIFICANT CHANGE UP
-  AMPICILLIN: SIGNIFICANT CHANGE UP
-  AZTREONAM: SIGNIFICANT CHANGE UP
-  CEFAZOLIN: SIGNIFICANT CHANGE UP
-  CEFEPIME: SIGNIFICANT CHANGE UP
-  CEFOXITIN: SIGNIFICANT CHANGE UP
-  CEFTAZIDIME: SIGNIFICANT CHANGE UP
-  CEFTRIAXONE: SIGNIFICANT CHANGE UP
-  CIPROFLOXACIN: SIGNIFICANT CHANGE UP
-  ERTAPENEM: SIGNIFICANT CHANGE UP
-  GENTAMICIN: SIGNIFICANT CHANGE UP
-  IMIPENEM: SIGNIFICANT CHANGE UP
-  LEVOFLOXACIN: SIGNIFICANT CHANGE UP
-  MEROPENEM: SIGNIFICANT CHANGE UP
-  NITROFURANTOIN: SIGNIFICANT CHANGE UP
-  PIPERACILLIN/TAZOBACTAM: SIGNIFICANT CHANGE UP
-  TOBRAMYCIN: SIGNIFICANT CHANGE UP
-  TRIMETHOPRIM/SULFAMETHOXAZOLE: SIGNIFICANT CHANGE UP
ANION GAP SERPL CALC-SCNC: 14 MMOL/L — SIGNIFICANT CHANGE UP (ref 5–17)
BUN SERPL-MCNC: 33 MG/DL — HIGH (ref 7–23)
CALCIUM SERPL-MCNC: 8.9 MG/DL — SIGNIFICANT CHANGE UP (ref 8.4–10.5)
CHLORIDE SERPL-SCNC: 106 MMOL/L — SIGNIFICANT CHANGE UP (ref 96–108)
CO2 SERPL-SCNC: 23 MMOL/L — SIGNIFICANT CHANGE UP (ref 22–31)
CREAT SERPL-MCNC: 1.22 MG/DL — SIGNIFICANT CHANGE UP (ref 0.5–1.3)
CULTURE RESULTS: SIGNIFICANT CHANGE UP
GLUCOSE SERPL-MCNC: 82 MG/DL — SIGNIFICANT CHANGE UP (ref 70–99)
HCT VFR BLD CALC: 38.8 % — SIGNIFICANT CHANGE UP (ref 34.5–45)
HGB BLD-MCNC: 12.4 G/DL — SIGNIFICANT CHANGE UP (ref 11.5–15.5)
MCHC RBC-ENTMCNC: 31.1 PG — SIGNIFICANT CHANGE UP (ref 27–34)
MCHC RBC-ENTMCNC: 32 GM/DL — SIGNIFICANT CHANGE UP (ref 32–36)
MCV RBC AUTO: 97.2 FL — SIGNIFICANT CHANGE UP (ref 80–100)
METHOD TYPE: SIGNIFICANT CHANGE UP
ORGANISM # SPEC MICROSCOPIC CNT: SIGNIFICANT CHANGE UP
ORGANISM # SPEC MICROSCOPIC CNT: SIGNIFICANT CHANGE UP
PLATELET # BLD AUTO: 254 K/UL — SIGNIFICANT CHANGE UP (ref 150–400)
POTASSIUM SERPL-MCNC: 4.3 MMOL/L — SIGNIFICANT CHANGE UP (ref 3.5–5.3)
POTASSIUM SERPL-SCNC: 4.3 MMOL/L — SIGNIFICANT CHANGE UP (ref 3.5–5.3)
RBC # BLD: 3.99 M/UL — SIGNIFICANT CHANGE UP (ref 3.8–5.2)
RBC # FLD: 14.7 % — HIGH (ref 10.3–14.5)
SODIUM SERPL-SCNC: 143 MMOL/L — SIGNIFICANT CHANGE UP (ref 135–145)
SPECIMEN SOURCE: SIGNIFICANT CHANGE UP
WBC # BLD: 8.65 K/UL — SIGNIFICANT CHANGE UP (ref 3.8–10.5)
WBC # FLD AUTO: 8.65 K/UL — SIGNIFICANT CHANGE UP (ref 3.8–10.5)

## 2018-01-31 PROCEDURE — 99285 EMERGENCY DEPT VISIT HI MDM: CPT | Mod: 25

## 2018-01-31 PROCEDURE — 94640 AIRWAY INHALATION TREATMENT: CPT

## 2018-01-31 PROCEDURE — 84484 ASSAY OF TROPONIN QUANT: CPT

## 2018-01-31 PROCEDURE — 85730 THROMBOPLASTIN TIME PARTIAL: CPT

## 2018-01-31 PROCEDURE — 85610 PROTHROMBIN TIME: CPT

## 2018-01-31 PROCEDURE — 82962 GLUCOSE BLOOD TEST: CPT

## 2018-01-31 PROCEDURE — 87186 SC STD MICRODIL/AGAR DIL: CPT

## 2018-01-31 PROCEDURE — 83735 ASSAY OF MAGNESIUM: CPT

## 2018-01-31 PROCEDURE — 73630 X-RAY EXAM OF FOOT: CPT

## 2018-01-31 PROCEDURE — 70450 CT HEAD/BRAIN W/O DYE: CPT

## 2018-01-31 PROCEDURE — 85027 COMPLETE CBC AUTOMATED: CPT

## 2018-01-31 PROCEDURE — 82550 ASSAY OF CK (CPK): CPT

## 2018-01-31 PROCEDURE — 87086 URINE CULTURE/COLONY COUNT: CPT

## 2018-01-31 PROCEDURE — 82553 CREATINE MB FRACTION: CPT

## 2018-01-31 PROCEDURE — 81001 URINALYSIS AUTO W/SCOPE: CPT

## 2018-01-31 PROCEDURE — 71045 X-RAY EXAM CHEST 1 VIEW: CPT

## 2018-01-31 PROCEDURE — 96374 THER/PROPH/DIAG INJ IV PUSH: CPT

## 2018-01-31 PROCEDURE — 80053 COMPREHEN METABOLIC PANEL: CPT

## 2018-01-31 PROCEDURE — 93005 ELECTROCARDIOGRAM TRACING: CPT

## 2018-01-31 PROCEDURE — 80048 BASIC METABOLIC PNL TOTAL CA: CPT

## 2018-01-31 PROCEDURE — 72170 X-RAY EXAM OF PELVIS: CPT

## 2018-01-31 RX ORDER — CIPROFLOXACIN LACTATE 400MG/40ML
1 VIAL (ML) INTRAVENOUS
Qty: 2 | Refills: 0 | OUTPATIENT
Start: 2018-01-31 | End: 2018-01-31

## 2018-01-31 RX ORDER — CIPROFLOXACIN LACTATE 400MG/40ML
250 VIAL (ML) INTRAVENOUS
Qty: 0 | Refills: 0 | Status: DISCONTINUED | OUTPATIENT
Start: 2018-01-31 | End: 2018-01-31

## 2018-01-31 RX ORDER — ACETAMINOPHEN 500 MG
2 TABLET ORAL
Qty: 0 | Refills: 0 | COMMUNITY

## 2018-01-31 RX ORDER — IPRATROPIUM/ALBUTEROL SULFATE 18-103MCG
3 AEROSOL WITH ADAPTER (GRAM) INHALATION ONCE
Qty: 0 | Refills: 0 | Status: COMPLETED | OUTPATIENT
Start: 2018-01-31 | End: 2018-01-31

## 2018-01-31 RX ADMIN — CARBIDOPA AND LEVODOPA 1 TABLET(S): 25; 100 TABLET ORAL at 14:09

## 2018-01-31 RX ADMIN — ATENOLOL 50 MILLIGRAM(S): 25 TABLET ORAL at 06:24

## 2018-01-31 RX ADMIN — Medication 3 MILLILITER(S): at 14:09

## 2018-01-31 RX ADMIN — Medication 81 MILLIGRAM(S): at 11:50

## 2018-01-31 NOTE — PROGRESS NOTE ADULT - SUBJECTIVE AND OBJECTIVE BOX
CC: f/u for uti    Patient reports  she feels well today, no dysuria  REVIEW OF SYSTEMS:  All other review of systems negative (Comprehensive ROS)    Antimicrobials Day #  : 3 abx  ciprofloxacin     Tablet 250 milliGRAM(s) Oral two times a day    Other Medications Reviewed    T(F): 98.3 (01-31-18 @ 13:21), Max: 98.3 (01-31-18 @ 13:21)  HR: 74 (01-31-18 @ 13:21)  BP: 110/63 (01-31-18 @ 13:21)  RR: 18 (01-31-18 @ 13:21)  SpO2: 96% (01-31-18 @ 13:21)  Wt(kg): --    PHYSICAL EXAM:  General: alert, no acute distress  Eyes:  anicteric, no conjunctival injection, no discharge  Oropharynx: no lesions or injection 	  Neck: supple, without adenopathy  Lungs: clear to auscultation  Heart: s1s2 2/6 sys m  Abdomen: soft, nondistended, nontender, without mass or organomegaly  Skin: no lesions  Extremities: no clubbing, cyanosis, or edema  Neurologic: alert, , moves all extremities    LAB RESULTS:                        12.4   8.65  )-----------( 254      ( 31 Jan 2018 07:29 )             38.8     01-31    143  |  106  |  33<H>  ----------------------------<  82  4.3   |  23  |  1.22    Ca    8.9      31 Jan 2018 08:58  Mg     2.1     01-30          MICROBIOLOGY:  RECENT CULTURES:  01-29 @ 08:52 .Urine Catheterized Escherichia coli    >100,000 CFU/ml Escherichia coli          RADIOLOGY REVIEWED:    < from: CT Head No Cont (01.29.18 @ 07:56) >  Impression: No evidence of acute hemorrhage, mass or mass effect.        < end of copied text >      Assessment:  Patient with dementia, parkinsons, had unwitnessed fall and found to have ecoli cystitis  Plan: agree with limiting antibiotics to just one more day

## 2018-01-31 NOTE — PROGRESS NOTE ADULT - SUBJECTIVE AND OBJECTIVE BOX
no  complaints   family wants pt dc    MEDICATIONS  (STANDING):  aspirin enteric coated 81 milliGRAM(s) Oral daily  ATENolol  Tablet 50 milliGRAM(s) Oral daily  carbidopa/levodopa  25/250 1 Tablet(s) Oral daily  cefTRIAXone   IVPB 1 Gram(s) IV Intermittent every 24 hours  mirtazapine 7.5 milliGRAM(s) Oral at bedtime  rivaroxaban 15 milliGRAM(s) Oral every 24 hours  simvastatin 10 milliGRAM(s) Oral at bedtime    MEDICATIONS  (PRN):      Vital Signs Last 24 Hrs  T(C): 36.6 (31 Jan 2018 04:43), Max: 36.6 (31 Jan 2018 04:43)  T(F): 97.9 (31 Jan 2018 04:43), Max: 97.9 (31 Jan 2018 04:43)  HR: 73 (31 Jan 2018 04:43) (59 - 77)  BP: 101/66 (31 Jan 2018 04:43) (101/66 - 116/64)  BP(mean): --  RR: 18 (31 Jan 2018 04:43) (18 - 18)  SpO2: 95% (31 Jan 2018 04:43) (95% - 97%)  CAPILLARY BLOOD GLUCOSE        I&O's Summary    30 Jan 2018 07:01  -  31 Jan 2018 07:00  --------------------------------------------------------  IN: 310 mL / OUT: 0 mL / NET: 310 mL    31 Jan 2018 07:01  -  31 Jan 2018 11:11  --------------------------------------------------------  IN: 240 mL / OUT: 0 mL / NET: 240 mL        PHYSICAL EXAM:  HEAD:  Atraumatic, Normocephalic  NECK: Supple, No JVD  CHEST/LUNG: Clear to auscultation bilaterally; No wheeze  HEART: Regular rate and rhythm;           murmur  ABDOMEN: Soft, Nontender, ;   EXTREMITIES:              edema  NEUROLOGY:  alert    LABS:                        12.4   8.65  )-----------( 254      ( 31 Jan 2018 07:29 )             38.8     01-31    143  |  106  |  33<H>  ----------------------------<  82  4.3   |  23  |  1.22    Ca    8.9      31 Jan 2018 08:58  Mg     2.1     01-30                          Consultant(s) Notes Reviewed:      Care Discussed with Consultants/Other Providers:

## 2018-01-31 NOTE — PROGRESS NOTE ADULT - ASSESSMENT
pt  with  afib/ xarelto, dementia, parjinsons   admitted  with a fall, no  syncope  had  generalized  weakness for past few  days   now with uti, on rocephin  ID called   pt  eval  labs, stable  family  wants pt dc  today to assisted  living    dc today,  final  sensitivity on urine  c/s, pending

## 2018-01-31 NOTE — PROGRESS NOTE ADULT - SUBJECTIVE AND OBJECTIVE BOX
- Patient seen and examined.  - In summary, patient is a 85y year old woman who presented with  weakness/fall (2018 13:09)  - Today, patient is without complaints.         *****MEDICATIONS:    MEDICATIONS  (STANDING):  aspirin enteric coated 81 milliGRAM(s) Oral daily  ATENolol  Tablet 50 milliGRAM(s) Oral daily  carbidopa/levodopa  25/250 1 Tablet(s) Oral daily  cefTRIAXone   IVPB 1 Gram(s) IV Intermittent every 24 hours  mirtazapine 7.5 milliGRAM(s) Oral at bedtime  rivaroxaban 15 milliGRAM(s) Oral every 24 hours  simvastatin 10 milliGRAM(s) Oral at bedtime    MEDICATIONS  (PRN):           ***** REVIEW OF SYSTEM:  GEN: no fever, no chills, no pain  RESP: no SOB, no cough, no sputum  CVS: no chest pain, no palpitations, no edema  GI: no abdominal pain, no nausea, no vomiting, no constipation, no diarrhea  : no dysuria, no frequency  NEURO: no headache, no dizziness  PSYCH: no depression, not anxious  Derm : no itching, no rash         ***** VITAL SIGNS:  T(F): 97.9 (18 @ 04:43), Max: 97.9 (18 @ 04:43)  HR: 73 (18 @ 04:43) (59 - 77)  BP: 101/66 (18 @ 04:43) (101/66 - 116/64)  RR: 18 (18 @ 04:43) (18 - 18)  SpO2: 95% (18 @ 04:43) (95% - 97%)  Wt(kg): --  ,   I&O's Summary    2018 07:01  -  2018 07:00  --------------------------------------------------------  IN: 310 mL / OUT: 0 mL / NET: 310 mL             *****PHYSICAL EXAM:  GEN: A&O X 3 , NAD , comfortable  HEENT: NCAT, EOMI, MMM, no icterus  NECK: Supple, No JVD  CVS: S1S2 , regular , No M/R/G appreciated  PULM: CTA B/L,  no W/R/R appreciated  ABD.: soft. non tender, non distended,  bowel sounds present  Extrem: intact pulses , no edema noted  Derm: No rash or ecchymosis noted  PSYCH: normal mood, no depression, not anxious         *****LAB AND IMAGIN.4   8.65  )-----------( 254      ( 2018 07:29 )             38.8               -    144  |  108  |  28<H>  ----------------------------<  84  5.2   |  22  |  1.20    Ca    9.3      2018 09:03  Mg     2.1                                [All pertinent recent Imaging/Reports reviewed]         *****A S S E S S M E N T   A N D   P L A N :  85F with past medical history A-fib on xaralto, COPD, Parkinson's who presented with fall, gen weakness.  on abx for uti  ID following  HR/BP stable  if becomes tachy consider tele  PT eval  cont AC  titrate BB as needed for HR control  await echo      __________________________  ROWDY Jain D.O.

## 2018-06-08 ENCOUNTER — APPOINTMENT (OUTPATIENT)
Dept: WOUND CARE | Facility: CLINIC | Age: 83
End: 2018-06-08
Payer: MEDICARE

## 2018-06-08 VITALS — WEIGHT: 192 LBS | BODY MASS INDEX: 36.25 KG/M2 | HEIGHT: 61 IN

## 2018-06-08 VITALS — TEMPERATURE: 98.6 F | SYSTOLIC BLOOD PRESSURE: 129 MMHG | HEART RATE: 65 BPM | DIASTOLIC BLOOD PRESSURE: 90 MMHG

## 2018-06-08 DIAGNOSIS — I48.91 UNSPECIFIED ATRIAL FIBRILLATION: ICD-10-CM

## 2018-06-08 PROBLEM — Z00.00 ENCOUNTER FOR PREVENTIVE HEALTH EXAMINATION: Status: ACTIVE | Noted: 2018-06-08

## 2018-06-08 PROCEDURE — 99204 OFFICE O/P NEW MOD 45 MIN: CPT | Mod: 25

## 2018-06-08 PROCEDURE — 10061 I&D ABSCESS COMP/MULTIPLE: CPT

## 2018-06-08 RX ORDER — SIMVASTATIN 10 MG/1
10 TABLET, FILM COATED ORAL
Refills: 0 | Status: ACTIVE | COMMUNITY

## 2018-06-08 RX ORDER — ATENOLOL 50 MG/1
50 TABLET ORAL
Refills: 0 | Status: ACTIVE | COMMUNITY

## 2018-06-08 RX ORDER — CARBIDOPA AND LEVODOPA 25; 100 MG/1; MG/1
25-100 TABLET ORAL
Refills: 0 | Status: ACTIVE | COMMUNITY

## 2018-06-08 RX ORDER — MIRTAZAPINE 45 MG/1
45 TABLET, FILM COATED ORAL
Refills: 0 | Status: ACTIVE | COMMUNITY

## 2018-06-08 RX ORDER — NITROFURANTOIN MACROCRYSTAL 50 MG
50 CAPSULE ORAL
Refills: 0 | Status: ACTIVE | COMMUNITY

## 2018-06-08 RX ORDER — RIVAROXABAN 2.5 MG/1
TABLET, FILM COATED ORAL
Refills: 0 | Status: ACTIVE | COMMUNITY

## 2018-06-08 RX ORDER — AMOXICILLIN AND CLAVULANATE POTASSIUM 875; 125 MG/1; MG/1
875-125 TABLET, COATED ORAL
Qty: 28 | Refills: 0 | Status: ACTIVE | COMMUNITY
Start: 2018-06-08 | End: 1900-01-01

## 2018-06-08 RX ORDER — LACTULOSE 10 G/15ML
10 SOLUTION ORAL
Refills: 0 | Status: ACTIVE | COMMUNITY

## 2018-06-12 LAB — BACTERIA SPEC CULT: ABNORMAL

## 2018-06-12 RX ORDER — CIPROFLOXACIN HYDROCHLORIDE 500 MG/1
500 TABLET, FILM COATED ORAL TWICE DAILY
Qty: 14 | Refills: 2 | Status: ACTIVE | COMMUNITY
Start: 2018-06-12 | End: 1900-01-01

## 2018-06-15 ENCOUNTER — APPOINTMENT (OUTPATIENT)
Dept: WOUND CARE | Facility: CLINIC | Age: 83
End: 2018-06-15
Payer: MEDICARE

## 2018-06-15 VITALS — TEMPERATURE: 97.5 F | SYSTOLIC BLOOD PRESSURE: 120 MMHG | HEART RATE: 62 BPM | DIASTOLIC BLOOD PRESSURE: 78 MMHG

## 2018-06-15 DIAGNOSIS — Z79.01 LONG TERM (CURRENT) USE OF ANTICOAGULANTS: ICD-10-CM

## 2018-06-15 DIAGNOSIS — Z91.81 HISTORY OF FALLING: ICD-10-CM

## 2018-06-15 DIAGNOSIS — Z86.69 PERSONAL HISTORY OF OTHER DISEASES OF THE NERVOUS SYSTEM AND SENSE ORGANS: ICD-10-CM

## 2018-06-15 PROCEDURE — 99213 OFFICE O/P EST LOW 20 MIN: CPT

## 2018-06-19 ENCOUNTER — TRANSCRIPTION ENCOUNTER (OUTPATIENT)
Age: 83
End: 2018-06-19

## 2018-07-02 ENCOUNTER — INPATIENT (INPATIENT)
Facility: HOSPITAL | Age: 83
LOS: 2 days | Discharge: HOME | DRG: 605 | End: 2018-07-05
Attending: INTERNAL MEDICINE | Admitting: INTERNAL MEDICINE
Payer: MEDICARE

## 2018-07-02 VITALS
DIASTOLIC BLOOD PRESSURE: 76 MMHG | OXYGEN SATURATION: 97 % | RESPIRATION RATE: 18 BRPM | SYSTOLIC BLOOD PRESSURE: 146 MMHG | HEART RATE: 71 BPM
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
## 2018-07-02 LAB
ALBUMIN SERPL ELPH-MCNC: 3.7 G/DL — SIGNIFICANT CHANGE UP (ref 3.3–5)
ALP SERPL-CCNC: 146 U/L — HIGH (ref 40–120)
ALT FLD-CCNC: 8 U/L — LOW (ref 10–45)
ANION GAP SERPL CALC-SCNC: 12 MMOL/L — SIGNIFICANT CHANGE UP (ref 5–17)
APPEARANCE UR: CLEAR — SIGNIFICANT CHANGE UP
APTT BLD: 33.1 SEC — SIGNIFICANT CHANGE UP (ref 27.5–37.4)
AST SERPL-CCNC: 17 U/L — SIGNIFICANT CHANGE UP (ref 10–40)
BASOPHILS # BLD AUTO: 0 K/UL — SIGNIFICANT CHANGE UP (ref 0–0.2)
BASOPHILS NFR BLD AUTO: 0 % — SIGNIFICANT CHANGE UP (ref 0–2)
BILIRUB SERPL-MCNC: 1 MG/DL — SIGNIFICANT CHANGE UP (ref 0.2–1.2)
BILIRUB UR-MCNC: NEGATIVE — SIGNIFICANT CHANGE UP
BUN SERPL-MCNC: 18 MG/DL — SIGNIFICANT CHANGE UP (ref 7–23)
CALCIUM SERPL-MCNC: 8.8 MG/DL — SIGNIFICANT CHANGE UP (ref 8.4–10.5)
CHLORIDE SERPL-SCNC: 101 MMOL/L — SIGNIFICANT CHANGE UP (ref 96–108)
CK SERPL-CCNC: 89 U/L — SIGNIFICANT CHANGE UP (ref 25–170)
CO2 SERPL-SCNC: 28 MMOL/L — SIGNIFICANT CHANGE UP (ref 22–31)
COLOR SPEC: YELLOW — SIGNIFICANT CHANGE UP
CREAT SERPL-MCNC: 1.19 MG/DL — SIGNIFICANT CHANGE UP (ref 0.5–1.3)
DIFF PNL FLD: NEGATIVE — SIGNIFICANT CHANGE UP
EOSINOPHIL # BLD AUTO: 0.1 K/UL — SIGNIFICANT CHANGE UP (ref 0–0.5)
EOSINOPHIL NFR BLD AUTO: 1.2 % — SIGNIFICANT CHANGE UP (ref 0–6)
EPI CELLS # UR: SIGNIFICANT CHANGE UP /HPF
GAS PNL BLDV: SIGNIFICANT CHANGE UP
GLUCOSE SERPL-MCNC: 91 MG/DL — SIGNIFICANT CHANGE UP (ref 70–99)
GLUCOSE UR QL: NEGATIVE — SIGNIFICANT CHANGE UP
HCT VFR BLD CALC: 34.8 % — SIGNIFICANT CHANGE UP (ref 34.5–45)
HGB BLD-MCNC: 11.5 G/DL — SIGNIFICANT CHANGE UP (ref 11.5–15.5)
INR BLD: 1.58 RATIO — HIGH (ref 0.88–1.16)
KETONES UR-MCNC: NEGATIVE — SIGNIFICANT CHANGE UP
LEUKOCYTE ESTERASE UR-ACNC: NEGATIVE — SIGNIFICANT CHANGE UP
LYMPHOCYTES # BLD AUTO: 1.6 K/UL — SIGNIFICANT CHANGE UP (ref 1–3.3)
LYMPHOCYTES # BLD AUTO: 15.3 % — SIGNIFICANT CHANGE UP (ref 13–44)
MCHC RBC-ENTMCNC: 31.3 PG — SIGNIFICANT CHANGE UP (ref 27–34)
MCHC RBC-ENTMCNC: 33 GM/DL — SIGNIFICANT CHANGE UP (ref 32–36)
MCV RBC AUTO: 94.7 FL — SIGNIFICANT CHANGE UP (ref 80–100)
MONOCYTES # BLD AUTO: 0.7 K/UL — SIGNIFICANT CHANGE UP (ref 0–0.9)
MONOCYTES NFR BLD AUTO: 6.8 % — SIGNIFICANT CHANGE UP (ref 2–14)
NEUTROPHILS # BLD AUTO: 8 K/UL — HIGH (ref 1.8–7.4)
NEUTROPHILS NFR BLD AUTO: 76.7 % — SIGNIFICANT CHANGE UP (ref 43–77)
NITRITE UR-MCNC: NEGATIVE — SIGNIFICANT CHANGE UP
PH UR: 6 — SIGNIFICANT CHANGE UP (ref 5–8)
PLATELET # BLD AUTO: 328 K/UL — SIGNIFICANT CHANGE UP (ref 150–400)
POTASSIUM SERPL-MCNC: 4.3 MMOL/L — SIGNIFICANT CHANGE UP (ref 3.5–5.3)
POTASSIUM SERPL-SCNC: 4.3 MMOL/L — SIGNIFICANT CHANGE UP (ref 3.5–5.3)
PROT SERPL-MCNC: 7.4 G/DL — SIGNIFICANT CHANGE UP (ref 6–8.3)
PROT UR-MCNC: SIGNIFICANT CHANGE UP
PROTHROM AB SERPL-ACNC: 17.4 SEC — HIGH (ref 9.8–12.7)
RBC # BLD: 3.67 M/UL — LOW (ref 3.8–5.2)
RBC # FLD: 12.8 % — SIGNIFICANT CHANGE UP (ref 10.3–14.5)
RBC CASTS # UR COMP ASSIST: SIGNIFICANT CHANGE UP /HPF (ref 0–2)
SODIUM SERPL-SCNC: 141 MMOL/L — SIGNIFICANT CHANGE UP (ref 135–145)
SP GR SPEC: 1.02 — SIGNIFICANT CHANGE UP (ref 1.01–1.02)
UROBILINOGEN FLD QL: NEGATIVE — SIGNIFICANT CHANGE UP
WBC # BLD: 10.4 K/UL — SIGNIFICANT CHANGE UP (ref 3.8–10.5)
WBC # FLD AUTO: 10.4 K/UL — SIGNIFICANT CHANGE UP (ref 3.8–10.5)
WBC UR QL: SIGNIFICANT CHANGE UP /HPF (ref 0–5)

## 2018-07-02 PROCEDURE — 73502 X-RAY EXAM HIP UNI 2-3 VIEWS: CPT | Mod: 26,RT

## 2018-07-02 PROCEDURE — 99285 EMERGENCY DEPT VISIT HI MDM: CPT | Mod: 25

## 2018-07-02 PROCEDURE — 71045 X-RAY EXAM CHEST 1 VIEW: CPT | Mod: 26

## 2018-07-02 PROCEDURE — 72125 CT NECK SPINE W/O DYE: CPT | Mod: 26

## 2018-07-02 PROCEDURE — 74177 CT ABD & PELVIS W/CONTRAST: CPT | Mod: 26

## 2018-07-02 PROCEDURE — 70486 CT MAXILLOFACIAL W/O DYE: CPT | Mod: 26

## 2018-07-02 PROCEDURE — 73560 X-RAY EXAM OF KNEE 1 OR 2: CPT | Mod: 26,50

## 2018-07-02 PROCEDURE — 73552 X-RAY EXAM OF FEMUR 2/>: CPT | Mod: 26,RT

## 2018-07-02 PROCEDURE — 93010 ELECTROCARDIOGRAM REPORT: CPT

## 2018-07-02 PROCEDURE — 70450 CT HEAD/BRAIN W/O DYE: CPT | Mod: 26

## 2018-07-02 PROCEDURE — 71260 CT THORAX DX C+: CPT | Mod: 26

## 2018-07-02 RX ORDER — RIVAROXABAN 15 MG-20MG
15 KIT ORAL EVERY 24 HOURS
Qty: 0 | Refills: 0 | Status: DISCONTINUED | OUTPATIENT
Start: 2018-07-02 | End: 2018-07-05

## 2018-07-02 RX ORDER — MIRTAZAPINE 45 MG/1
7.5 TABLET, ORALLY DISINTEGRATING ORAL DAILY
Qty: 0 | Refills: 0 | Status: DISCONTINUED | OUTPATIENT
Start: 2018-07-02 | End: 2018-07-05

## 2018-07-02 RX ORDER — SIMVASTATIN 20 MG/1
10 TABLET, FILM COATED ORAL AT BEDTIME
Qty: 0 | Refills: 0 | Status: DISCONTINUED | OUTPATIENT
Start: 2018-07-02 | End: 2018-07-05

## 2018-07-02 RX ORDER — CARBIDOPA AND LEVODOPA 25; 100 MG/1; MG/1
1 TABLET ORAL DAILY
Qty: 0 | Refills: 0 | Status: DISCONTINUED | OUTPATIENT
Start: 2018-07-02 | End: 2018-07-02

## 2018-07-02 RX ORDER — SODIUM CHLORIDE 9 MG/ML
1000 INJECTION, SOLUTION INTRAVENOUS ONCE
Qty: 0 | Refills: 0 | Status: COMPLETED | OUTPATIENT
Start: 2018-07-02 | End: 2018-07-02

## 2018-07-02 RX ORDER — CARBIDOPA AND LEVODOPA 25; 100 MG/1; MG/1
1 TABLET ORAL THREE TIMES A DAY
Qty: 0 | Refills: 0 | Status: DISCONTINUED | OUTPATIENT
Start: 2018-07-02 | End: 2018-07-02

## 2018-07-02 RX ORDER — ATENOLOL 25 MG/1
25 TABLET ORAL DAILY
Qty: 0 | Refills: 0 | Status: DISCONTINUED | OUTPATIENT
Start: 2018-07-02 | End: 2018-07-05

## 2018-07-02 RX ORDER — ALBUTEROL 90 UG/1
1 AEROSOL, METERED ORAL DAILY
Qty: 0 | Refills: 0 | Status: DISCONTINUED | OUTPATIENT
Start: 2018-07-02 | End: 2018-07-05

## 2018-07-02 RX ORDER — TETANUS TOXOID, REDUCED DIPHTHERIA TOXOID AND ACELLULAR PERTUSSIS VACCINE, ADSORBED 5; 2.5; 8; 8; 2.5 [IU]/.5ML; [IU]/.5ML; UG/.5ML; UG/.5ML; UG/.5ML
0.5 SUSPENSION INTRAMUSCULAR ONCE
Qty: 0 | Refills: 0 | Status: COMPLETED | OUTPATIENT
Start: 2018-07-02 | End: 2018-07-02

## 2018-07-02 RX ORDER — ACETAMINOPHEN 500 MG
1000 TABLET ORAL ONCE
Qty: 0 | Refills: 0 | Status: COMPLETED | OUTPATIENT
Start: 2018-07-02 | End: 2018-07-02

## 2018-07-02 RX ORDER — CARBIDOPA AND LEVODOPA 25; 100 MG/1; MG/1
1 TABLET ORAL DAILY
Qty: 0 | Refills: 0 | Status: DISCONTINUED | OUTPATIENT
Start: 2018-07-02 | End: 2018-07-05

## 2018-07-02 RX ORDER — SODIUM CHLORIDE 9 MG/ML
1000 INJECTION INTRAMUSCULAR; INTRAVENOUS; SUBCUTANEOUS ONCE
Qty: 0 | Refills: 0 | Status: COMPLETED | OUTPATIENT
Start: 2018-07-02 | End: 2018-07-02

## 2018-07-02 RX ORDER — ASPIRIN/CALCIUM CARB/MAGNESIUM 324 MG
81 TABLET ORAL DAILY
Qty: 0 | Refills: 0 | Status: DISCONTINUED | OUTPATIENT
Start: 2018-07-02 | End: 2018-07-05

## 2018-07-02 RX ADMIN — Medication 1000 MILLIGRAM(S): at 14:53

## 2018-07-02 RX ADMIN — RIVAROXABAN 15 MILLIGRAM(S): KIT at 18:39

## 2018-07-02 RX ADMIN — TETANUS TOXOID, REDUCED DIPHTHERIA TOXOID AND ACELLULAR PERTUSSIS VACCINE, ADSORBED 0.5 MILLILITER(S): 5; 2.5; 8; 8; 2.5 SUSPENSION INTRAMUSCULAR at 10:17

## 2018-07-02 RX ADMIN — MIRTAZAPINE 7.5 MILLIGRAM(S): 45 TABLET, ORALLY DISINTEGRATING ORAL at 20:47

## 2018-07-02 RX ADMIN — SODIUM CHLORIDE 500 MILLILITER(S): 9 INJECTION, SOLUTION INTRAVENOUS at 12:50

## 2018-07-02 RX ADMIN — CARBIDOPA AND LEVODOPA 1 TABLET(S): 25; 100 TABLET ORAL at 22:19

## 2018-07-02 RX ADMIN — Medication 400 MILLIGRAM(S): at 09:06

## 2018-07-02 RX ADMIN — SODIUM CHLORIDE 500 MILLILITER(S): 9 INJECTION INTRAMUSCULAR; INTRAVENOUS; SUBCUTANEOUS at 08:55

## 2018-07-02 RX ADMIN — SIMVASTATIN 10 MILLIGRAM(S): 20 TABLET, FILM COATED ORAL at 22:19

## 2018-07-02 RX ADMIN — ALBUTEROL 1 PUFF(S): 90 AEROSOL, METERED ORAL at 22:19

## 2018-07-02 NOTE — ED PROVIDER NOTE - PHYSICAL EXAMINATION
Gen: NAD, AOx2   Head: NC, abrasions to forehead, nose, right cheek  HEENT: PERRL, dry oral mucosa, normal conjunctiva  Lung: CTAB, no respiratory distress, no ecchymosis or erythema to chest  CV: rrr, no murmurs, Normal perfusion  Abd: soft, NTND, no bruising or abrasions  MSK: No edema, abrasions to right hip and bilateral knees.  Pelvis stable.  bilateral lower legs wrapped - well healing wounds to lateral malleoli.  Neuro: No focal neurologic deficits.  Moving all extremities spontaneously.  Skin: abrasions as noted above   - Zeina Barnes DO Gen: NAD, AOx2   Head: NC, abrasions to forehead, nose, right cheek  HEENT: PERRL, dry oral mucosa, normal conjunctiva  Lung: CTAB, no respiratory distress, no ecchymosis or erythema to chest  CV: rrr, no murmurs, Normal perfusion  Abd: soft, NTND, no bruising or abrasions  MSK: No edema, abrasions to right hip and bilateral knees.  Pelvis stable.  bilateral lower legs wrapped - well healing wounds to lateral malleoli.  Neuro: No focal neurologic deficits.  Moving all extremities spontaneously.  Skin: abrasions as noted above   - Zeina Barnes DO  Attending Kasey Shafer: Gen: NAD, heent: hematoma to forehead, eomi, perrla, mmm, op pink, uvula midline, neck; nttp, no nuchal rigidity, chest: nttp, no crepitus, cv: rrr, no murmurs, lungs: ctab, abd: soft, nontender, nondistended, no peritoneal signs, +BS, no guarding, ext abrasions to b/l knee. scar to right knee. ttp right hip. strong distal pulses, neuro: awake and following commands, speech clear, sensation a intact, no focal deficits Gen: NAD, AOx2   Head: NC, abrasions to forehead, nose, right cheek  HEENT: PERRL, dry oral mucosa, normal conjunctiva  Lung: CTAB, no respiratory distress, no ecchymosis or erythema to chest  CV: rrr, no murmurs, Normal perfusion  Abd: soft, NTND, no bruising or abrasions  MSK: No edema, abrasions to right hip and bilateral knees.  Pelvis stable.  No midline vertebral tenderness.  full passive ROM to bilateral lower extremities. bilateral lower legs wrapped - well healing, chronic appearing wounds to bilateral heels.   Neuro: No focal neurologic deficits.  Moving all extremities spontaneously.  Skin: abrasions as noted above   - Zeina Barnes DO  Attending Kasey Shafer: Gen: NAD, heent: hematoma to forehead, eomi, perrla, mmm, op pink, uvula midline, neck; nttp, no nuchal rigidity, chest: nttp, no crepitus, cv: rrr, no murmurs, lungs: ctab, abd: soft, nontender, nondistended, no peritoneal signs, +BS, no guarding, ext abrasions to b/l knee. scar to right knee. ttp right hip. strong distal pulses, neuro: awake and following commands, speech clear, sensation a intact, no focal deficits

## 2018-07-02 NOTE — ED ADULT NURSE REASSESSMENT NOTE - NS ED NURSE REASSESS COMMENT FT1
Patients home health aide at bedside states that patient requires pill crushing for medication administration. Patient coughed when tested with apple sauce. Medications not administered. Report given to Paulino SNOW. Paulino made aware. Patients home health aide at bedside states that patient requires pill crushing for medication administration. Patient passed dysphagia screening. Patient coughed when tested with apple sauce. Medications not administered. Report given to Paulino SNOW. Paulino made aware.

## 2018-07-02 NOTE — H&P ADULT - NSHPREVIEWOFSYSTEMS_GEN_ALL_CORE
REVIEW OF SYSTEMS:  GEN: no fever,    no chills  RESP: no SOB,   no cough  CVS: no chest pain,   no palpitations  GI: no abdominal pain,   no nausea,   no vomiting,   no constipation,   no diarrhea  : no dysuria,   no frequency  NEURO: no headache,   no dizziness  Derm : no rash

## 2018-07-02 NOTE — H&P ADULT - ASSESSMENT
pt  with  afib/ xarelto, dementia, parjinsons   admitted  with a fall, no  syncope  had  generalized  weakness for past few  days  b/l heel ulcers, from  being bed  bound  ID called   pt  eval  labs, stable  had  mlpe  xrays// cxr/ pelvis, no fx   ct head/ c  spine/ maxilla no fx    < from: CT Abdomen and Pelvis w/ IV Cont (07.02.18 @ 12:23) >    IMPRESSION: No thoracic, abdominal, or pelvic sequela of trauma      < end of copied text > pt  with  afib/ xarelto, dementia,  barely speaks, , parkinson's , falls  in the past   admitted  with a fall, no  syncope, from assisted  living  had  generalized  weakness for past few  days  b/l heel ulcers, from  being bed  bound  ID called/  wound care eval   pt  eval  labs, stable  had  mlpe  xrays// cxr/ pelvis, no fx   ct head/ c  spine/ maxilla no fx  aide at bedside    < from: CT Abdomen and Pelvis w/ IV Cont (07.02.18 @ 12:23) >    IMPRESSION: No thoracic, abdominal, or pelvic sequela of trauma      < end of copied text >

## 2018-07-02 NOTE — PROGRESS NOTE ADULT - ASSESSMENT
Patient is a 85y female with 85F pmh afib on xarelto, parkinsons, dementia, COPD , falls, , arrived  via   EMS after being found on the floor, she was noted to have abrasions on her face and knees   normal white count, no fever recorded here,   her UA is clear no signs of UTI   reviewed CT reports no reports of an infection   noted pressure ulcer on heels, there is no infection, consider the skin team to make recommendation for the care of ulcer, defer to medicine to call consult  work up of fall as per medicine     Plan:  no indication for abx use, no infection found based on available data   Re consult us as needed, will sign off   Continue supportive care as per medicine

## 2018-07-02 NOTE — ED PROVIDER NOTE - OBJECTIVE STATEMENT
85F pmh afib on xarelto, parkinsons, dementia, COPD presents by EMS after being found on the ground near bed by her aid this morning.  Pt was found laying face down wedged between dresser and bed.  Per aid patient is at her mental baseline.  Pt does not remember what happened.  denies any pain at this time.  Abrasions noted on forehead, face, bilateral knees.  Pt is poor historian.  Per aid, patient is currently on an antibiotic but unknown which one and for what reason.   - Zeina Barnes, DO 85F pmh afib on xarelto, parkinsons, dementia, COPD presents by EMS after being found on the ground near bed by her aid this morning.  Pt was found laying face down wedged between dresser and bed.  Per aid patient is at her baseline mental status.  Pt does not remember what happened.  denies any pain at this time.  Abrasions noted on forehead, face, bilateral knees.  Pt is poor historian.  Per aid, patient is currently on an antibiotic but unknown which one and for what reason. PMD  Lisbet Aden.  - Zeina Barnes, DO

## 2018-07-02 NOTE — ED PROVIDER NOTE - MEDICAL DECISION MAKING DETAILS
85F found on ground this morning with abrasions to face.  Baseline mental status per aid, A&Ox2.  Pt is on xarelto.  Will obtain labs, coags, CK, ekg, Ct head/cspine/maxfac, xrays to eval for bleeds and xrays, give gentle hydration, analgesia, tetanus and admit.  - Zeina Barnes, DO 85F found on ground this morning with abrasions to face.  Baseline mental status per aid, A&Ox2.  Pt is on xarelto.  Will obtain labs, coags, CK, ekg, Ct head/cspine/maxfac, xrays to eval for bleeds and xrays, give gentle hydration, analgesia, tetanus and admit.  - Zeina Barnes,   Attending Kasey Shafer: 86 y/o female found on the ground. pt on anticoagulation. pt with h/o parkinsons and wounds to heels. upon arrival pt hemodynamically stable. per aid at her baseline mental status. has been on abx for possible infection, ?Uti. trauma work up ordered which was negative for acute pathology. likely mechanical fall. d/w pt and family, unclear whether syncopal episode. will admit for further work up. blood cultures sent.

## 2018-07-02 NOTE — H&P ADULT - NSHPPHYSICALEXAM_GEN_ALL_CORE
PHYSICAL EXAMINATION:  Vital Signs Last 24 Hrs  T(C): 36.9 (02 Jul 2018 10:14), Max: 37.2 (02 Jul 2018 08:50)  T(F): 98.5 (02 Jul 2018 10:14), Max: 99 (02 Jul 2018 08:50)  HR: 58 (02 Jul 2018 13:21) (58 - 71)  BP: 117/54 (02 Jul 2018 13:21) (117/54 - 147/62)  BP(mean): --  RR: 18 (02 Jul 2018 13:21) (18 - 22)  SpO2: 100% (02 Jul 2018 13:21) (97% - 100%)  CAPILLARY BLOOD GLUCOSE      POCT Blood Glucose.: 90 mg/dL (02 Jul 2018 08:43)        GENERAL: NAD, well-groomed,  HEAD:   normocephalic  EYES: sclera anicteric  ENMT: mucous membranes moist  NECK: supple, No JVD  CHEST/LUNG: clear to auscultation bilaterally;    no      rales   ,   no rhonchi,   HEART: normal S1, S2  ABDOMEN: BS+, soft, ND, NT   EXTREMITIES:    no    edema    b/l LEs  ulcers  b/l heels  NEURO: arousable,     moving arms  dementia, bed  bound  SKIN:  ecchymoses, forehead

## 2018-07-02 NOTE — ED PROVIDER NOTE - PROGRESS NOTE DETAILS
Attending Kasey Shafer: family at bedside. pt with continued discomfort to the hip. initial xrays negative. mild lower abdominal pain. UA negative however pt is on abx. will ct and re-eval. pt tba

## 2018-07-02 NOTE — CONSULT NOTE ADULT - SUBJECTIVE AND OBJECTIVE BOX
CHIEF COMPLAINT:Patient is a 85y old  Female who presents with a chief complaint of fall (02 Jul 2018 13:23)      HPI:    : 85F pmh afib on xarelto, parkinsons, dementia, COPD , falls, , arrived  via   EMS  after being found on the ground near bed by her aide  this morning.  Pt was found laying face down wedged between dresser and bed.  Per aide,  patient is at her baseline mental status.  Pt does not remember what happened.  denies any pain at this time.  Abrasions noted on forehead, face, bilateral knees.  Pt is poor historian.  Per aid, patient is currently on an antibiotic but unknown which one and for what reason. PMD  Lisbet Aden. - Zeina Barnes DO (02 Jul 2018 13:23)      PAST MEDICAL & SURGICAL HISTORY:  Afib      MEDICATIONS  (STANDING):  ALBUTerol    90 MICROgram(s) HFA Inhaler 1 Puff(s) Inhalation daily  aspirin enteric coated 81 milliGRAM(s) Oral daily  ATENolol  Tablet 25 milliGRAM(s) Oral daily  carbidopa/levodopa  25/250 1 Tablet(s) Oral daily  mirtazapine 7.5 milliGRAM(s) Oral daily  rivaroxaban 15 milliGRAM(s) Oral every 24 hours  simvastatin 10 milliGRAM(s) Oral at bedtime    MEDICATIONS  (PRN):      FAMILY HISTORY:      SOCIAL HISTORY:    [ ] Non-smoker  [ ] Smoker  [ ] Alcohol    Allergies    No Known Allergies    Intolerances    	    REVIEW OF SYSTEMS:  CONSTITUTIONAL: No fever, weight loss, or fatigue  EYES: No eye pain, visual disturbances, or discharge  ENT:  No difficulty hearing, tinnitus, vertigo; No sinus or throat pain  NECK: No pain or stiffness  RESPIRATORY: No cough, wheezing, chills or hemoptysis; + Shortness of Breath  CARDIOVASCULAR: No chest pain, palpitations, passing out, dizziness, or leg swelling  GASTROINTESTINAL: No abdominal or epigastric pain. No nausea, vomiting, or hematemesis; No diarrhea or constipation. No melena or hematochezia.  GENITOURINARY: No dysuria, frequency, hematuria, or incontinence  NEUROLOGICAL: No headaches, memory loss, loss of strength, numbness, or tremors  SKIN: No itching, burning, rashes, or lesions   LYMPH Nodes: No enlarged glands  ENDOCRINE: No heat or cold intolerance; No hair loss  MUSCULOSKELETAL: No joint pain or swelling; No muscle, back, or extremity pain  PSYCHIATRIC: No depression, anxiety, mood swings, or difficulty sleeping  HEME/LYMPH: No easy bruising, or bleeding gums  ALLERGY AND IMMUNOLOGIC: No hives or eczema	    [ ] All others negative	  [ ] Unable to obtain    PHYSICAL EXAM:  T(C): 37.2 (07-02-18 @ 18:28), Max: 37.2 (07-02-18 @ 08:50)  HR: 62 (07-02-18 @ 18:28) (58 - 71)  BP: 144/63 (07-02-18 @ 18:28) (117/54 - 147/62)  RR: 18 (07-02-18 @ 18:28) (18 - 22)  SpO2: 99% (07-02-18 @ 18:28) (97% - 100%)  Wt(kg): --  I&O's Summary      Appearance: Normal	  HEENT:   Normal oral mucosa, PERRL, EOMI	  Lymphatic: No lymphadenopathy  Cardiovascular: Normal S1 S2, No JVD, + murmurs, No edema  Respiratory: Lungs clear to auscultation	  Psychiatry: A & O x 3, Mood & affect appropriate  Gastrointestinal:  Soft, Non-tender, + BS	  Skin: No rashes, No ecchymoses, No cyanosis	  Neurologic: Non-focal  Extremities: Normal range of motion, No clubbing, cyanosis or edema  Vascular: Peripheral pulses palpable 2+ bilaterally    TELEMETRY: 	    ECG:  	  RADIOLOGY:  OTHER: 	  	  LABS:	 	    CARDIAC MARKERS:  CARDIAC MARKERS ( 02 Jul 2018 09:08 )  x     / x     / 89 U/L / x     / x                                  11.5   10.4  )-----------( 328      ( 02 Jul 2018 09:08 )             34.8     07-02    141  |  101  |  18  ----------------------------<  91  4.3   |  28  |  1.19    Ca    8.8      02 Jul 2018 09:08  Phos  3.0     07-02  Mg     2.0     07-02    TPro  7.4  /  Alb  3.7  /  TBili  1.0  /  DBili  x   /  AST  17  /  ALT  8<L>  /  AlkPhos  146<H>  07-02    proBNP:   Lipid Profile:   HgA1c:   TSH:   PT/INR - ( 02 Jul 2018 09:08 )   PT: 17.4 sec;   INR: 1.58 ratio         PTT - ( 02 Jul 2018 09:08 )  PTT:33.1 sec    PREVIOUS DIAGNOSTIC TESTING:    < from: 12 Lead ECG (01.29.18 @ 07:12) >  NORMAL SINUS RHYTHM  CANNOT RULE OUT INFERIOR INFARCT , AGE UNDETERMINED    < end of copied text >

## 2018-07-02 NOTE — CONSULT NOTE ADULT - ASSESSMENT
pt with hx of a.fib with s/p fall/sob   r/o sss  r/o orthostatic hypotension sec to parkinson disease  ? antiarrythmic to keep her in nsr will discuss with DR samuel

## 2018-07-02 NOTE — H&P ADULT - HISTORY OF PRESENT ILLNESS
International Travel? No(1)    	  : 85F pmh afib on xarelto, parkinsons, dementia, COPD , arrived  via   EMS  after being found on the ground near bed by her aide  this morning.  Pt was found laying face down wedged between dresser and bed.  Per aide,  patient is at her baseline mental status.  Pt does not remember what happened.  denies any pain at this time.  Abrasions noted on forehead, face, bilateral knees.  Pt is poor historian.  Per aid, patient is currently on an antibiotic but unknown which one and for what reason. PMD  Lisbet Aden. - Zeina Barnes, DO International Travel? No(1)    	  : 85F pmh afib on xarelto, parkinsons, dementia, COPD , falls, , arrived  via   EMS  after being found on the ground near bed by her aide  this morning.  Pt was found laying face down wedged between dresser and bed.  Per aide,  patient is at her baseline mental status.  Pt does not remember what happened.  denies any pain at this time.  Abrasions noted on forehead, face, bilateral knees.  Pt is poor historian.  Per aid, patient is currently on an antibiotic but unknown which one and for what reason. PMD  Lisbet Aden. - Zeina Barnes, DO

## 2018-07-02 NOTE — ED ADULT NURSE NOTE - OBJECTIVE STATEMENT
86y/o Female presented to the ED via EMS from home s/p fall. A&Ox2-baseline. Hx of Dementia/ parkinson's/ A-Fib. EMS states paint takes Xarelto, and baby aspirin. As per home aide patient fell face forward at standing height and was wedged between the dresser and wall. Aide states that she found the patient at 745, unsure when the fall occurred. Abrasions noted on forehead, right cheek, right hip, right and left knee. Bilateral heel ulcers noted. Stage 2 pressure ulcer noted on sacrum. Patient denies pain at this time. Denies fever, chills, N/V/D, chest pain, SOB. Abdomen soft, non tender, non distended. PEERL. 84y/o Female presented to the ED via EMS from home s/p fall. A&Ox2-baseline. Hx of Dementia/ parkinson's/ A-Fib. EMS states paint takes Xarelto, and baby aspirin. As per home aide patient fell face forward at standing height and was wedged between the dresser and wall. Aide states that she found the patient at 745, unsure when the fall occurred. Abrasions noted on forehead, right cheek, right hip, right and left knee. Bilateral heel ulcers noted. Stage 2 pressure ulcer noted on sacrum. Patient denies pain at this time. Denies fever, chills, N/V/D, chest pain, SOB. Abdomen soft, non tender, non distended. PERRL. 84y/o Female presented to the ED via EMS from home s/p fall. A&Ox2-baseline. Hx of Dementia/ parkinson's/ A-Fib. EMS states pt takes Xarelto, and baby aspirin. As per home aide patient fell face forward at standing height and was wedged between the dresser and wall. Aide states that she found the patient at 745, unsure when the fall occurred. Abrasions noted on forehead, right cheek, right hip, right and left knee. Bilateral heel ulcers noted. Stage 2 pressure ulcer noted on sacrum. Patient denies pain at this time. Denies fever, chills, N/V/D, chest pain, SOB. Abdomen soft, non tender, non distended. PERRL.

## 2018-07-02 NOTE — H&P ADULT - NSHPLABSRESULTS_GEN_ALL_CORE
LABS:                        11.5   10.4  )-----------( 328      ( 2018 09:08 )             34.8     07-    141  |  101  |  18  ----------------------------<  91  4.3   |  28  |  1.19    Ca    8.8      2018 09:08  Phos  3.0     07-  Mg     2.0     07-    TPro  7.4  /  Alb  3.7  /  TBili  1.0  /  DBili  x   /  AST  17  /  ALT  8<L>  /  AlkPhos  146<H>  07    PT/INR - ( 2018 09:08 )   PT: 17.4 sec;   INR: 1.58 ratio         PTT - ( 2018 09:08 )  PTT:33.1 sec  CARDIAC MARKERS ( 2018 09:08 )  x     / x     / 89 U/L / x     / x          Urinalysis Basic - ( 2018 09:08 )    Color: Yellow / Appearance: Clear / S.023 / pH: x  Gluc: x / Ketone: Negative  / Bili: Negative / Urobili: Negative   Blood: x / Protein: Trace / Nitrite: Negative   Leuk Esterase: Negative / RBC: 3-5 /HPF / WBC 0-2 /HPF   Sq Epi: x / Non Sq Epi: OCC /HPF / Bacteria: x           @ 09:08  4.1  21

## 2018-07-03 ENCOUNTER — TRANSCRIPTION ENCOUNTER (OUTPATIENT)
Age: 83
End: 2018-07-03

## 2018-07-03 LAB
CULTURE RESULTS: NO GROWTH — SIGNIFICANT CHANGE UP
SPECIMEN SOURCE: SIGNIFICANT CHANGE UP

## 2018-07-03 RX ORDER — FONDAPARINUX SODIUM 2.5 MG/.5ML
1 INJECTION, SOLUTION SUBCUTANEOUS
Qty: 0 | Refills: 0 | COMMUNITY

## 2018-07-03 RX ORDER — MIRTAZAPINE 45 MG/1
1 TABLET, ORALLY DISINTEGRATING ORAL
Qty: 0 | Refills: 0 | COMMUNITY

## 2018-07-03 RX ORDER — AMANTADINE HCL 100 MG
1 CAPSULE ORAL
Qty: 0 | Refills: 0 | COMMUNITY

## 2018-07-03 RX ORDER — MULTIVIT-MIN/FERROUS GLUCONATE 9 MG/15 ML
1 LIQUID (ML) ORAL
Qty: 0 | Refills: 0 | COMMUNITY

## 2018-07-03 RX ORDER — LACTULOSE 10 G/15ML
10 SOLUTION ORAL
Qty: 0 | Refills: 0 | COMMUNITY

## 2018-07-03 RX ORDER — SIMVASTATIN 20 MG/1
1 TABLET, FILM COATED ORAL
Qty: 0 | Refills: 0 | COMMUNITY

## 2018-07-03 RX ORDER — IPRATROPIUM/ALBUTEROL SULFATE 18-103MCG
3 AEROSOL WITH ADAPTER (GRAM) INHALATION
Qty: 0 | Refills: 0 | COMMUNITY

## 2018-07-03 RX ORDER — ASPIRIN/CALCIUM CARB/MAGNESIUM 324 MG
1 TABLET ORAL
Qty: 0 | Refills: 0 | COMMUNITY

## 2018-07-03 RX ORDER — ACETAMINOPHEN 500 MG
2 TABLET ORAL
Qty: 0 | Refills: 0 | COMMUNITY

## 2018-07-03 RX ORDER — CARBIDOPA AND LEVODOPA 25; 100 MG/1; MG/1
1 TABLET ORAL
Qty: 0 | Refills: 0 | COMMUNITY

## 2018-07-03 RX ADMIN — Medication 81 MILLIGRAM(S): at 11:49

## 2018-07-03 RX ADMIN — MIRTAZAPINE 7.5 MILLIGRAM(S): 45 TABLET, ORALLY DISINTEGRATING ORAL at 21:07

## 2018-07-03 RX ADMIN — SIMVASTATIN 10 MILLIGRAM(S): 20 TABLET, FILM COATED ORAL at 21:07

## 2018-07-03 RX ADMIN — ALBUTEROL 1 PUFF(S): 90 AEROSOL, METERED ORAL at 11:49

## 2018-07-03 RX ADMIN — ATENOLOL 25 MILLIGRAM(S): 25 TABLET ORAL at 05:47

## 2018-07-03 RX ADMIN — CARBIDOPA AND LEVODOPA 1 TABLET(S): 25; 100 TABLET ORAL at 11:48

## 2018-07-03 RX ADMIN — RIVAROXABAN 15 MILLIGRAM(S): KIT at 17:06

## 2018-07-03 NOTE — CONSULT NOTE ADULT - ASSESSMENT
A/P: pt  with  afib/ xarelto, dementia,  barely speaks, , parkinson's , falls  in the past admitted  with a fall, no  syncope, &  generalized  weakness for past few  days    Rt hip, BLE unstageable pressure ulcers- CAVILON  Abrasions Cavilon  Abx per Medicine/ ID  Moisturize intact skin w/ SWEEN cream BID  con't Nutrition (as tolerated), Nutrition Consult  con't Offloading   con't Pericare  Care as per medicine will follow w/ you  Upon discharge [facility] at [facility] [address] [place] [address] [phone]  D/w team & attng  Thank you for this consult  Saritha Mcgowan PA-C [id number]

## 2018-07-03 NOTE — DISCHARGE NOTE ADULT - PATIENT PORTAL LINK FT
You can access the [facility] Portal, offered by [facility], by registering with the following website: [email or web address]

## 2018-07-03 NOTE — DISCHARGE NOTE ADULT - ADDITIONAL INSTRUCTIONS
Follow-up with your primary care physician within 1 week of discharge. Call for appointment.   Follow-up outpatient with Dr. [facility]. Call for appointment.

## 2018-07-03 NOTE — DISCHARGE NOTE ADULT - CARE PROVIDER_API CALL
Shakeel Carter (MD), Surgery  [facility] [address] [place] [address]  Phone: [phone]  Fax: [phone]

## 2018-07-03 NOTE — DISCHARGE NOTE ADULT - MEDICATION SUMMARY - MEDICATIONS TO CHANGE
I will SWITCH the dose or number of times a day I take the medications listed below when I get home from the hospital:    atenolol 25 mg oral tablet  -- 2 tab(s) by mouth once a day

## 2018-07-03 NOTE — DIETITIAN INITIAL EVALUATION ADULT. - ENERGY NEEDS
Height: 62 inches, Weight: 169 pounds  BMI: 31 kg/m2 IBW: 110 pounds (+/-10%), %IBW: 153%  Pertinent Info: Per chart, 84 y/o female with PMH of Afib on Xarelto, Parkinson's dementia, COPD, admitted s/p fall at assisted living found on the floor. +3 bilateral legs edema, bilateral ankles and right heel unstageable, bilateral lateral feet stage 1 and sacrum stage 2 pressure ulcers noted at this time.

## 2018-07-03 NOTE — DISCHARGE NOTE ADULT - MEDICATION SUMMARY - MEDICATIONS TO TAKE
I will START or STAY ON the medications listed below when I get home from the hospital:    Aspirin Enteric Coated 81 mg oral delayed release tablet  -- 1 tab(s) by mouth once a day  -- Indication: For Need for prophylactic measure    Xarelto 15 mg oral tablet  -- 1 tab(s) by mouth once a day  -- Indication: For Afib    mirtazapine 7.5 mg oral tablet  -- 1 tab(s) by mouth once a day (at bedtime)  -- Indication: For Depression    simvastatin 10 mg oral tablet  -- 1 tab(s) by mouth once a day  -- Indication: For Hyperlipidemia    Sinemet 25 mg-250 mg oral tablet  -- 1 tab(s) by mouth once a day (in the morning)  -- Indication: For Parkinsons    amantadine 100 mg oral tablet  -- 1 tab(s) by mouth once a day  -- Indication: For Need for preventative measure    atenolol 25 mg oral tablet  -- 1 tab(s) by mouth once a day  -- Indication: For Cardiovascular agent    ipratropium-albuterol 0.5 mg-2.5 mg/3 mLinhalation solution  -- 3 milliliter(s) inhaled once a day (in the morning)  -- Indication: For COPD    lactulose 10 g/15 mL oral solution  -- 10 milliliter(s) by mouth once a day (at bedtime), As Needed  -- Indication: For Constipation    Thera M oral tablet  -- 1 tab(s) by mouth once a day  -- Indication: For Supplement

## 2018-07-03 NOTE — DIETITIAN INITIAL EVALUATION ADULT. - PT NOT SOURCE
confused/Pt with Parkinson's dementia AAO x 2, confused, poor historian, barely verbal, asleep at time of visit

## 2018-07-03 NOTE — DISCHARGE NOTE ADULT - HOSPITAL COURSE
84 yo female with PMH of Afib on Xarelto,  dementia,  barely speaks, , parkinson's , falls  in the past, COPD. Pt presented to ED,  from Assisted Living Facility ,  after being found between her bed and dresser, found by her aide.  No  syncope, had  generalized  weakness for past few  days. Blood and Urine Cultures sent . No antibiotic per ID. Pt has unstageable bilateral  heel ulcers from being bedbound. Pt [facility]. Continue wound care per wound care team.  Xray pelvis , Cspine/maxilla show no fractures. CT head negative acute findings. Family aware of  risks  with anticoagulation. 86 yo female with PMH of Afib on Xarelto,  dementia,  barely speaks, , parkinson's , falls  in the past, COPD. Pt presented to ED,  from Assisted Living Facility ,  after being found between her bed and dresser, found by her aide.  No  syncope, had  generalized  weakness for past few  days. Blood and Urine Cultures sent , no growth in blood and urine. No antibiotic per ID. Pt has unstageable bilateral  heel ulcers from being bedbound. Pt [facility] with [facility]. Continue wound care per wound care team: Rt hip, BLE unstageable pressure ulcers- CAVILON, Abrasions Cavilon, Moisturize intact skin w/ SWEEN cream BID.Continue  Nutrition (as tolerated), offloading, pericare .  Xray pelvis , Cspine/maxilla show no fractures. CT head negative acute findings. Family aware of  risks  with anticoagulation.    Pt is cleared for discharge to assisted living facility per attending.

## 2018-07-03 NOTE — DIETITIAN INITIAL EVALUATION ADULT. - SOURCE
other (specify)/Medical records, HHA at bedside - been with pt > 1 year; Pt discussed during multidisciplinary rounds this morning

## 2018-07-03 NOTE — DISCHARGE NOTE ADULT - CARE PLAN
Principal Discharge DX:	Fall  Goal:	no injury, no fracture  Assessment and plan of treatment:	Maintain safety, adequate hydration, nutrition, activity, and rest as tolerated.   Follow-up with your primary care physician within 1 week after discharge. Call for appointment.  Secondary Diagnosis:	Afib  Assessment and plan of treatment:	Atrial fibrillation is the most common heart rhythm problem & has the risk of stroke & heart attack  It helps if you control your blood pressure, not drink more than 1-2 alcohol drinks per day, cut down on caffeine, getting treatment for over active thyroid gland, & getting exercise  Call your doctor if you feel your heart racing or beating unusually, chest tightness or pain, lightheaded, faint, shortness of breath especially with exercise  It is important to take your heart medication as prescribed  You may be on anticoagulation which is very important to take as directed - you may need blood work to monitor drug levels  Secondary Diagnosis:	Pressure ulcer of heel  Assessment and plan of treatment:	Continue with wound care to bilateral heel ulcers.  Follow-up outpatient with Dr. [facility] within 1 week. Call for appointment. Principal Discharge DX:	Fall  Goal:	no injury, no fracture  Assessment and plan of treatment:	Maintain safety, adequate hydration, nutrition, activity, and rest as tolerated.   Follow-up with your primary care physician within 1 week after discharge. Call for appointment.  Secondary Diagnosis:	Afib  Assessment and plan of treatment:	Atrial fibrillation is the most common heart rhythm problem & has the risk of stroke & heart attack  It helps if you control your blood pressure, not drink more than 1-2 alcohol drinks per day, cut down on caffeine, getting treatment for over active thyroid gland, & getting exercise  Call your doctor if you feel your heart racing or beating unusually, chest tightness or pain, lightheaded, faint, shortness of breath especially with exercise  It is important to take your heart medication as prescribed  You may be on anticoagulation which is very important to take as directed - you may need blood work to monitor drug levels  Secondary Diagnosis:	Pressure ulcer of heel  Assessment and plan of treatment:	Continue with wound care :  Rt hip, BLE unstageable pressure ulcers- CAVILON, Abrasions Cavilon, Moisturize intact skin w/ SWEEN cream BID.Continue  Nutrition (as tolerated), offloading, pericare .    Follow-up outpatient with Dr. [facility] at [facility] within 1 week. Call for appointment.

## 2018-07-03 NOTE — DIETITIAN INITIAL EVALUATION ADULT. - FEEDING SKILL
minimal assistance/Per HHA, pt able to use utensils and able to feed self, but prefers to have feeding assistance at times. Pt also needs encouragements during meals as she can be forgetful and sleep through the meal.

## 2018-07-03 NOTE — DISCHARGE NOTE ADULT - MEDICATION SUMMARY - MEDICATIONS TO STOP TAKING
I will STOP taking the medications listed below when I get home from the hospital:    ciprofloxacin 250 mg oral tablet  -- 1 tab(s) by mouth 2 times a day

## 2018-07-03 NOTE — CONSULT NOTE ADULT - SUBJECTIVE AND OBJECTIVE BOX
Wound SURGERY CONSULT NOTE    HPI:  85F pmh afib on xarelto, parkinsons, dementia, COPD, & multiple falls arrived  via   EMS  after being found on the ground near bed by her aide  this morning.  Pt was found laying face down wedged between dresser and bed.  Per aide,  patient is at her baseline mental status.  Pt does not remember what happened.  denies any pain at this time.  Abrasions noted on forehead, face, bilateral knees.  Pt is poor historian.  Per aid, patient is currently on an antibiotic but unknown which one and for what reason. PMD  Lisbet Aden. - Zeina Barnes,    Wound consult requested to assist w/ management.  Pt is known to wound center. Pt s/p Lt heel I&D.   No odor, redness, warmth, f/c/s, nor drainage noted.  Pt sedentary & incontinent.  Pericare & offloading initiated upon admission.  DSD placed while awaiting consult.      PAST MEDICAL & SURGICAL HISTORY:  Afib  Parkinsons  Dementia   COPD  Falls    REVIEW OF SYSTEMS  Pt unable to offer      MEDICATIONS  (STANDING):  ALBUTerol    90 MICROgram(s) HFA Inhaler 1 Puff(s) Inhalation daily  aspirin enteric coated 81 milliGRAM(s) Oral daily  ATENolol  Tablet 25 milliGRAM(s) Oral daily  carbidopa/levodopa  25/250 1 Tablet(s) Oral daily  mirtazapine 7.5 milliGRAM(s) Oral daily  rivaroxaban 15 milliGRAM(s) Oral every 24 hours  simvastatin 10 milliGRAM(s) Oral at bedtime    No Known Allergies    SOCIAL HISTORY:   (+)HHA; Denies smoking, ETOH, drugs    FAMILY HISTORY: non significant       Vital Signs Last 24 Hrs  T(C): 36.7 (2018 11:03), Max: 37.1 (2018 05:41)  T(F): 98 (2018 11:03), Max: 98.8 (2018 05:41)  HR: 80 (2018 11:03) (63 - 80)  BP: 97/61 (2018 11:03) (97/61 - 125/64)  BP(mean): --  RR: 18 (2018 11:03) (17 - 18)  SpO2: 97% (2018 11:03) (97% - 100%)    NAD /  Alert/ Confused  Obese/ frail/ Disheveled  [employer] [facility] P500 bed    Cardiovascular: RRR (+)m    Respiratory: CTA    Gastrointestinal soft NT/ND (+)BS     Neurology  weakened strength & sensation grossly intact    Musculoskeletal/Vascular: stiff ROM  BLE edema equal  (+) DP/PT pulses palpable  BLE equally warm  no acute ischemia noted  hemosiderin staining  no deformities/ contractures    Skin: atrophic, frail,  ecchymosis w/o hematoma    hyperpigmentation c/w Mild moisture dermatitis of bilateral buttocks w/o open wounds nor blistering    Rt forehead & cheek w/ dry denuded abrasion  ecchymosis on nose    Rt hip1cm x 1.4cm x 0cm unstageable eschar    Rt foot w/ 5th MTP w/ unstageable eschar  Rt Lateral malleolus w/ unstageable eschar  Rt Heel w/ DTI 3cmx 3.5cm    Lt heel w/DTI w/ resolving, dried blister    No drainage  No odor, erythema, increased warmth, tenderness, induration, fluctuance    LABS:      141  |  101  |  18  ----------------------------<  91  4.3   |  28  |  1.19    Ca    8.8      2018 09:08  Phos  3.0       Mg     2.0         TPro  7.4  /  Alb  3.7  /  TBili  1.0  /  DBili  x   /  AST  17  /  ALT  8<L>  /  AlkPhos  146<H>                            11.5   10.4  )-----------( 328      ( 2018 09:08 )             34.8     PT/INR - ( 2018 09:08 )   PT: 17.4 sec;   INR: 1.58 ratio    PTT - ( 2018 09:08 )  PTT:33.1 sec    Urinalysis Basic - ( 2018 09:08 )    Color: Yellow / Appearance: Clear / S.023 / pH: x  Gluc: x / Ketone: Negative  / Bili: Negative / Urobili: Negative   Blood: x / Protein: Trace / Nitrite: Negative   Leuk Esterase: Negative / RBC: 3-5 /HPF / WBC 0-2 /HPF   Sq Epi: x / Non Sq Epi: OCC /HPF / Bacteria: x        RADIOLOGY & ADDITIONAL STUDIES:    < from: CT Abdomen and Pelvis w/ IV Cont (18 @ 12:23) >  FINDINGS:    CHEST:     LUNGS AND LARGE AIRWAYS: Patent central airways. No pulmonary nodules.  PLEURA: No pleural effusion.  VESSELS: Within normal limits.  HEART: Heart size is normal. No pericardial effusion.  MEDIASTINUM AND EZIO: No lymphadenopathy.  CHEST WALL AND LOWER NECK: Within normal limits.    ABDOMEN AND PELVIS:    LIVER: Within normal limits.  BILE DUCTS: Normal caliber.  GALLBLADDER: Within normal limits.  SPLEEN: Within normal limits.  PANCREAS: Within normal limits.  ADRENALS: Within normal limits.  KIDNEYS/URETERS: Within normal limits.    BLADDER: Within normal limits.  REPRODUCTIVE ORGANS: Uterus and adnexa normal.    BOWEL: Periampullary duodenaldiverticulum. No bowel obstruction.   Appendix nonvisualized.  PERITONEUM: No ascites.  VESSELS:  Extensive calcific atherosclerotic disease.  RETROPERITONEUM: No lymphadenopathy.    ABDOMINAL WALL: Within normal limits.  BONES: Within normal limits.    IMPRESSION: No thoracic, abdominal, or pelvic sequela of trauma      < from: CT Cervical Spine No Cont (18 @ 09:36) >  Description: A noncontrast head CT, noncontrast maxillofacial CT, and a   noncontrast cervical spine CT were performed. The head CT was performed   with 5 mm axial images. The cervical spine CT study was performed with   axial thin section images with sagittal and coronal reformatted series.   The maxillofacial CT was performed axial thin section images with coronal   reformatted series.    Comparison is made to a head CT study from 2018.    Head CT:    There is no evidence for calvarial fracture, acute hemorrhage, acute   cortical infarct, mass effect, or hydrocephalus.     Patchy hypodensities are present in the periventricular and subcortical   white matter which most likely reflect chronic microvascular ischemic   changes given the patient's age. Cerebral volume loss is present.    The gray matter white matter junction is preserved.     The visualized portions of the paranasal sinuses and mastoid air cells   are clear.    Cervical spine CT:    There is no evidence for acute fracture, subluxation, or prevertebral   hematoma.    Facet degenerative changes, disc bulges, and disc osteophyte complexes   are present. The associated degree of spinal canal stenosis is not well   demonstrated on this study.    Small rounded lucencies are noted involving the odontoid process, most   likely reflecting cystic degenerative changes, as marked narrowing is   noted of the anterior atlantodental interval.    Maxillofacial CT:    There is no retrobulbar hematoma. There is no gross evidence for globe   rupture or radiopaque foreign bodies.    No acute facial bone fractures are visualized. No air-fluid levels are   present in the paranasal sinuses. Mild mucosal thickening is noted in the   paranasal sinuses.    IMPRESSION:    1. On the head CT, there is no evidence for calvarial fracture or acute   intracranial hemorrhage. If symptoms persist, consider short interval   follow-up head CT or brain MRI follow-up if there are no MRI   contraindications.  2. On the cervical spine CT, there is no evidence for acute cervical   spine fracture. If symptoms persist, consider cervical spine MRI   follow-up if there are no MRI contraindications.       Cultures:  Culture - Blood (18 @ 16:42)    Specimen Source: .Blood Blood    Culture Results:   No growth to date.    Culture - Urine (18 @ 10:50)    Specimen Source: .Urine Catheterized    Culture Results:   No growth

## 2018-07-03 NOTE — PROGRESS NOTE ADULT - ASSESSMENT
pt  with  afib/ xarelto, dementia,  barely speaks, , parkinson's , falls  in the past   admitted  with a fall, no  syncope, from assisted  living  had  generalized  weakness for past few  days  b/l heel ulcers, from  being bed  bound  per ID, no ab needed,    wound care eval noted   pt  eval  labs, stable  had  mlpe  xrays// cxr/ pelvis, no fx   ct head/ c  spine/ maxilla no fx  aide at bedside  family aware of  risks  with anticoagulation    < from: CT Abdomen and Pelvis w/ IV Cont (07.02.18 @ 12:23) >    IMPRESSION: No thoracic, abdominal, or pelvic sequela of trauma      < end of copied text > pt  with  afib/ xarelto, dementia,  barely speaks, , parkinson's , falls  in the past   admitted  with a fall, no  syncope, from assisted  living  had  generalized  weakness for past few  days  b/l heel ulcers, from  being bed  bound  per ID, no ab needed,    wound care eval noted   pt  eval  labs, stable  had  mlpe  xrays// cxr/ pelvis, no fx   ct head/ c  spine/ maxilla no fx  aide at bedside  family aware of  risks  with anticoagulation  start  d/c planning, if blood/ urine  c/s, negative    < from: CT Abdomen and Pelvis w/ IV Cont (07.02.18 @ 12:23) >    IMPRESSION: No thoracic, abdominal, or pelvic sequela of trauma      < end of copied text >

## 2018-07-03 NOTE — DISCHARGE NOTE ADULT - PLAN OF CARE
no injury, no fracture Maintain safety, adequate hydration, nutrition, activity, and rest as tolerated.   Follow-up with your primary care physician within 1 week after discharge. Call for appointment. Atrial fibrillation is the most common heart rhythm problem & has the risk of stroke & heart attack  It helps if you control your blood pressure, not drink more than 1-2 alcohol drinks per day, cut down on caffeine, getting treatment for over active thyroid gland, & getting exercise  Call your doctor if you feel your heart racing or beating unusually, chest tightness or pain, lightheaded, faint, shortness of breath especially with exercise  It is important to take your heart medication as prescribed  You may be on anticoagulation which is very important to take as directed - you may need blood work to monitor drug levels Continue with wound care to bilateral heel ulcers.  [facility] 1 [facility]. Call for appointment. Continue with wound care :  Rt hip, BLE unstageable pressure ulcers- CAVILON, Abrasions Cavilon, Moisturize intact skin w/ SWEEN cream BID.Continue  Nutrition (as tolerated), offloading, pericare .    Follow-up outpatient with [facility]. [facility] within 1 week. Call for appointment.

## 2018-07-04 RX ADMIN — Medication 81 MILLIGRAM(S): at 12:47

## 2018-07-04 RX ADMIN — RIVAROXABAN 15 MILLIGRAM(S): KIT at 16:46

## 2018-07-04 RX ADMIN — CARBIDOPA AND LEVODOPA 1 TABLET(S): 25; 100 TABLET ORAL at 12:47

## 2018-07-04 RX ADMIN — MIRTAZAPINE 7.5 MILLIGRAM(S): 45 TABLET, ORALLY DISINTEGRATING ORAL at 12:47

## 2018-07-04 RX ADMIN — ALBUTEROL 1 PUFF(S): 90 AEROSOL, METERED ORAL at 12:48

## 2018-07-04 RX ADMIN — ATENOLOL 25 MILLIGRAM(S): 25 TABLET ORAL at 05:16

## 2018-07-04 RX ADMIN — SIMVASTATIN 10 MILLIGRAM(S): 20 TABLET, FILM COATED ORAL at 21:19

## 2018-07-04 NOTE — PROGRESS NOTE ADULT - ASSESSMENT
pt  with  afib/ xarelto, dementia,  barely speaks, , parkinson's , falls  in the past   admitted  with a fall, no  syncope, from assisted  living  had  generalized  weakness for past few  days  b/l heel ulcers, from  being bed  bound  per ID, no ab needed,    wound care eval noted  had  mlpe  xrays// cxr/ pelvis, no fx   ct head/ c  spine/ maxilla no fx  aide at bedside  family aware of  risks  with anticoagulation  start  d/c planning, , dc  to  assisted living    < from: CT Abdomen and Pelvis w/ IV Cont (07.02.18 @ 12:23) >    IMPRESSION: No thoracic, abdominal, or pelvic sequela of trauma      < end of copied text >

## 2018-07-04 NOTE — PHYSICAL THERAPY INITIAL EVALUATION ADULT - PRECAUTIONS/LIMITATIONS, REHAB EVAL
Abrasions noted on forehead, face, bilateral knees.  Pt is poor historian.  Per aid, patient is currently on an antibiotic but unknown which one and for what reason. All imaging negative for acute fx. Abrasions noted on forehead, face, bilateral knees.  Pt is poor historian.  Per aid, patient is currently on an antibiotic but unknown which one and for what reason. All imaging negative for acute fx./fall precautions

## 2018-07-04 NOTE — PHYSICAL THERAPY INITIAL EVALUATION ADULT - ADDITIONAL COMMENTS
Pt is a resident at an [facility] and required assist with all ADLs, pt has a HHA 7days/week x 12hrs Pt [facility] at [facility] and required assist with all ADLs, pt has a HHA 7days/week x 12hrs. As per aide at bedside, pt amb short distances with RW to/from w/c and bathroom.

## 2018-07-04 NOTE — PHYSICAL THERAPY INITIAL EVALUATION ADULT - MANUAL MUSCLE TESTING RESULTS, REHAB EVAL
3/5 t/o/grossly assessed due to good air movement/clear to auscultation bilaterally/airway patent/normal/respirations non-labored/breath sounds equal

## 2018-07-04 NOTE — PHYSICAL THERAPY INITIAL EVALUATION ADULT - GAIT DEVIATIONS NOTED, PT EVAL
decreased adi/decreased velocity of limb motion/decreased step length/decreased weight-shifting ability

## 2018-07-04 NOTE — PHYSICAL THERAPY INITIAL EVALUATION ADULT - PERTINENT HX OF CURRENT PROBLEM, REHAB EVAL
Pt is a 84y/o female admitted to [facility] On 7/2/18 pmh afib on xarelto, parkinsons, dementia, COPD , falls, , arrived  via   EMS  after being found on the ground near bed by her aide  this morning.  Pt was found laying face down wedged between dresser and bed.  Per aide,  patient is at her baseline mental status.  Pt does not remember what happened.

## 2018-07-05 VITALS
DIASTOLIC BLOOD PRESSURE: 74 MMHG | SYSTOLIC BLOOD PRESSURE: 127 MMHG | OXYGEN SATURATION: 97 % | RESPIRATION RATE: 18 BRPM | TEMPERATURE: 98 F | HEART RATE: 87 BPM

## 2018-07-05 PROCEDURE — 82803 BLOOD GASES ANY COMBINATION: CPT

## 2018-07-05 PROCEDURE — 85027 COMPLETE CBC AUTOMATED: CPT

## 2018-07-05 PROCEDURE — 73552 X-RAY EXAM OF FEMUR 2/>: CPT

## 2018-07-05 PROCEDURE — 71260 CT THORAX DX C+: CPT

## 2018-07-05 PROCEDURE — 72125 CT NECK SPINE W/O DYE: CPT

## 2018-07-05 PROCEDURE — 87040 BLOOD CULTURE FOR BACTERIA: CPT

## 2018-07-05 PROCEDURE — 70450 CT HEAD/BRAIN W/O DYE: CPT

## 2018-07-05 PROCEDURE — 74177 CT ABD & PELVIS W/CONTRAST: CPT

## 2018-07-05 PROCEDURE — 80053 COMPREHEN METABOLIC PANEL: CPT

## 2018-07-05 PROCEDURE — 99285 EMERGENCY DEPT VISIT HI MDM: CPT | Mod: 25

## 2018-07-05 PROCEDURE — 96374 THER/PROPH/DIAG INJ IV PUSH: CPT | Mod: XU

## 2018-07-05 PROCEDURE — 85730 THROMBOPLASTIN TIME PARTIAL: CPT

## 2018-07-05 PROCEDURE — 85610 PROTHROMBIN TIME: CPT

## 2018-07-05 PROCEDURE — 87086 URINE CULTURE/COLONY COUNT: CPT

## 2018-07-05 PROCEDURE — 82435 ASSAY OF BLOOD CHLORIDE: CPT

## 2018-07-05 PROCEDURE — 85014 HEMATOCRIT: CPT

## 2018-07-05 PROCEDURE — 99222 1ST HOSP IP/OBS MODERATE 55: CPT

## 2018-07-05 PROCEDURE — 70486 CT MAXILLOFACIAL W/O DYE: CPT

## 2018-07-05 PROCEDURE — 93005 ELECTROCARDIOGRAM TRACING: CPT

## 2018-07-05 PROCEDURE — 83735 ASSAY OF MAGNESIUM: CPT

## 2018-07-05 PROCEDURE — 83605 ASSAY OF LACTIC ACID: CPT

## 2018-07-05 PROCEDURE — 82947 ASSAY GLUCOSE BLOOD QUANT: CPT

## 2018-07-05 PROCEDURE — 84295 ASSAY OF SERUM SODIUM: CPT

## 2018-07-05 PROCEDURE — 94640 AIRWAY INHALATION TREATMENT: CPT

## 2018-07-05 PROCEDURE — 73502 X-RAY EXAM HIP UNI 2-3 VIEWS: CPT

## 2018-07-05 PROCEDURE — 82550 ASSAY OF CK (CPK): CPT

## 2018-07-05 PROCEDURE — 84100 ASSAY OF PHOSPHORUS: CPT

## 2018-07-05 PROCEDURE — 82962 GLUCOSE BLOOD TEST: CPT

## 2018-07-05 PROCEDURE — 90471 IMMUNIZATION ADMIN: CPT | Mod: XU

## 2018-07-05 PROCEDURE — 97162 PT EVAL MOD COMPLEX 30 MIN: CPT

## 2018-07-05 PROCEDURE — 84132 ASSAY OF SERUM POTASSIUM: CPT

## 2018-07-05 PROCEDURE — 90715 TDAP VACCINE 7 YRS/> IM: CPT

## 2018-07-05 PROCEDURE — 81001 URINALYSIS AUTO W/SCOPE: CPT

## 2018-07-05 PROCEDURE — 82330 ASSAY OF CALCIUM: CPT

## 2018-07-05 PROCEDURE — 71045 X-RAY EXAM CHEST 1 VIEW: CPT

## 2018-07-05 PROCEDURE — 73560 X-RAY EXAM OF KNEE 1 OR 2: CPT

## 2018-07-05 RX ORDER — ATENOLOL 25 MG/1
1 TABLET ORAL
Qty: 0 | Refills: 0 | COMMUNITY
Start: 2018-07-05

## 2018-07-05 RX ORDER — ATENOLOL 25 MG/1
2 TABLET ORAL
Qty: 0 | Refills: 0 | COMMUNITY

## 2018-07-05 RX ADMIN — ATENOLOL 25 MILLIGRAM(S): 25 TABLET ORAL at 07:01

## 2018-07-05 RX ADMIN — CARBIDOPA AND LEVODOPA 1 TABLET(S): 25; 100 TABLET ORAL at 12:12

## 2018-07-05 RX ADMIN — ALBUTEROL 1 PUFF(S): 90 AEROSOL, METERED ORAL at 12:13

## 2018-07-05 RX ADMIN — Medication 81 MILLIGRAM(S): at 12:12

## 2018-07-05 NOTE — PROGRESS NOTE ADULT - ASSESSMENT
pt  with  afib/ xarelto, dementia,  barely speaks, , parkinson's , falls  in the past   admitted  with a fall, no  syncope, from assisted  living  had  generalized  weakness for past few  days  b/l heel ulcers, from  being bed  bound  per ID, no ab needed,    wound care eval noted  had  mlpe  xrays// cxr/ pelvis, no fx   ct head/ c  spine/ maxilla no fx  aide at bedside  family aware of  risks  with anticoagulation   d/c planning, , dc  to  assisted living today    < from: CT Abdomen and Pelvis w/ IV Cont (07.02.18 @ 12:23) >    IMPRESSION: No thoracic, abdominal, or pelvic sequela of trauma      < end of copied text >

## 2018-07-05 NOTE — PROGRESS NOTE ADULT - SUBJECTIVE AND OBJECTIVE BOX
HPI:   Patient is a 85y female with 85F pmh afib on xarelto, parkinsons, dementia, COPD , falls, , arrived  via   EMS after being found on the ground near bed by her aide  this morning.  Pt was found laying face down wedged between dresser and bed.  Per aide,  patient is at her baseline mental status.  Pt does not remember what happened.  denies any pain at this time.  Abrasions noted on forehead, face, bilateral knees.  Pt is poor historian.  Per aid, patient is currently on an antibiotic but unknown which one.     REVIEW OF SYSTEMS:  All other review of systems negative (Comprehensive ROS)    PAST MEDICAL & SURGICAL HISTORY:  Afib      Allergies    No Known Allergies    Intolerances        Antimicrobials Day #      Other Medications:  ALBUTerol    90 MICROgram(s) HFA Inhaler 1 Puff(s) Inhalation daily  aspirin enteric coated 81 milliGRAM(s) Oral daily  ATENolol  Tablet 25 milliGRAM(s) Oral daily  carbidopa/levodopa  25/250 1 Tablet(s) Oral daily  mirtazapine 7.5 milliGRAM(s) Oral daily  rivaroxaban 15 milliGRAM(s) Oral every 24 hours  simvastatin 10 milliGRAM(s) Oral at bedtime      FAMILY HISTORY: non contributory       SOCIAL HISTORY:  Smoking:   unknown    T(F): 98.1 (18 @ 16:53), Max: 99 (18 @ 08:50)  HR: 68 (18 @ 16:53)  BP: 124/53 (18 @ 16:53)  RR: 20 (18 @ 16:53)  SpO2: 98% (18 @ 16:53)  Wt(kg): --    PHYSICAL EXAM:  General: alert, no acute distress  Eyes:  anicteric, no conjunctival injection, no discharge  Oropharynx: no lesions or injection 	  Neck: supple, without adenopathy  Lungs: clear to auscultation  Heart: regular rate and rhythm; no murmur, rubs or gallops  Abdomen: soft, nondistended, nontender, without mass or organomegaly  Skin: abrasions noted on her face and knees, pressure ulcer noted on heels, does not appear to be infected   Extremities: no clubbing, cyanosis, or edema  Neurologic: confused, but opened her eyes     LAB RESULTS:                        11.5   10.4  )-----------( 328      ( 2018 09:08 )             34.8     07-02    141  |  101  |  18  ----------------------------<  91  4.3   |  28  |  1.19    Ca    8.8      2018 09:08  Phos  3.0     07-  Mg     2.0     -    TPro  7.4  /  Alb  3.7  /  TBili  1.0  /  DBili  x   /  AST  17  /  ALT  8<L>  /  AlkPhos  146<H>      LIVER FUNCTIONS - ( 2018 09:08 )  Alb: 3.7 g/dL / Pro: 7.4 g/dL / ALK PHOS: 146 U/L / ALT: 8 U/L / AST: 17 U/L / GGT: x           Urinalysis Basic - ( 2018 09:08 )    Color: Yellow / Appearance: Clear / S.023 / pH: x  Gluc: x / Ketone: Negative  / Bili: Negative / Urobili: Negative   Blood: x / Protein: Trace / Nitrite: Negative   Leuk Esterase: Negative / RBC: 3-5 /HPF / WBC 0-2 /HPF   Sq Epi: x / Non Sq Epi: OCC /HPF / Bacteria: x        MICROBIOLOGY:  RECENT CULTURES:        RADIOLOGY REVIEWED:  < from: CT Abdomen and Pelvis w/ IV Cont (18 @ 12:23) >    IMPRESSION: No thoracic, abdominal, or pelvic sequela of trauma    < end of copied text >    < from: CT Head No Cont (18 @ 09:36) >  IMPRESSION:    1. On the head CT, there is no evidence for calvarial fracture or acute   intracranial hemorrhage. If symptoms persist, consider short interval   follow-up head CT or brain MRI follow-up if there are no MRI   contraindications.  2. On the cervical spine CT, there is no evidence for acute cervical   spine fracture. If symptoms persist, consider cervical spine MRI   follow-up if there are no MRI contraindications.   3. On the maxillofacial CT, there is no evidencefor facial bone fracture.    < end of copied text >
- Patient seen and examined.  - In summary, patient is a 85 year old woman who presented s/p fall (02 Jul 2018 13:23)  - Today, patient is without complaints.         *****MEDICATIONS:    MEDICATIONS  (STANDING):  ALBUTerol    90 MICROgram(s) HFA Inhaler 1 Puff(s) Inhalation daily  aspirin enteric coated 81 milliGRAM(s) Oral daily  ATENolol  Tablet 25 milliGRAM(s) Oral daily  carbidopa/levodopa  25/250 1 Tablet(s) Oral daily  mirtazapine 7.5 milliGRAM(s) Oral daily  rivaroxaban 15 milliGRAM(s) Oral every 24 hours  simvastatin 10 milliGRAM(s) Oral at bedtime    MEDICATIONS  (PRN):             ***** REVIEW OF SYSTEM:  GEN: no fever, no chills, no pain  RESP: no SOB, no cough, no sputum  CVS: no chest pain, no palpitations, no edema  GI: no abdominal pain, no nausea, no vomiting, no constipation, no diarrhea  : no dysuria, no frequency  NEURO: no headache, no dizziness  PSYCH: no depression, not anxious  Derm : no itching, no rash         ***** VITAL SIGNS:    T(F): 98.6 (07-04-18 @ 04:56), Max: 98.6 (07-03-18 @ 20:33)  HR: 67 (07-04-18 @ 04:56) (67 - 90)  BP: 113/73 (07-04-18 @ 04:56) (97/61 - 116/75)  RR: 18 (07-04-18 @ 04:56) (18 - 18)  SpO2: 97% (07-04-18 @ 04:56) (97% - 99%)  Wt(kg): --  ,   I&O's Summary    03 Jul 2018 07:01  -  04 Jul 2018 07:00  --------------------------------------------------------  IN: 280 mL / OUT: 0 mL / NET: 280 mL                 *****PHYSICAL EXAM:  GEN: A&O X 3 , comfortable  HEENT: NCAT, EOMI, MMM, no icterus  NECK: Supple, No JVD  CVS: S1S2 , regular , No M/R/G appreciated  PULM: CTA B/L,  no W/R/R appreciated  ABD.: soft. non tender, non distended,  bowel sounds present  Extrem: intact pulses , no edema noted  Derm: No rash or ecchymosis noted  PSYCH: normal mood, no depression, not anxious         *****LAB AND IMAGING:                         [All pertinent recent Imaging/Reports reviewed]         *****A S S E S S M E N T   A N D   P L A N :  85F with afib on Xarelto parkinson's dementia, COPD , falls, presenting after being found on the floor  VSS  cont current meds  check orthostatics  PT eval  cont AC    __________________________  A. TAHIR Jain.
- Patient seen and examined.  - In summary, patient is a 85 year old woman who presented s/p fall (02 Jul 2018 13:23)  - Today, patient is without complaints.         *****MEDICATIONS:    MEDICATIONS  (STANDING):  ALBUTerol    90 MICROgram(s) HFA Inhaler 1 Puff(s) Inhalation daily  aspirin enteric coated 81 milliGRAM(s) Oral daily  ATENolol  Tablet 25 milliGRAM(s) Oral daily  carbidopa/levodopa  25/250 1 Tablet(s) Oral daily  mirtazapine 7.5 milliGRAM(s) Oral daily  rivaroxaban 15 milliGRAM(s) Oral every 24 hours  simvastatin 10 milliGRAM(s) Oral at bedtime    MEDICATIONS  (PRN):           ***** REVIEW OF SYSTEM:  GEN: no fever, no chills, no pain  RESP: no SOB, no cough, no sputum  CVS: no chest pain, no palpitations, no edema  GI: no abdominal pain, no nausea, no vomiting, no constipation, no diarrhea  : no dysuria, no frequency  NEURO: no headache, no dizziness  PSYCH: no depression, not anxious  Derm : no itching, no rash         ***** VITAL SIGNS:    T(F): 98.3 (07-05-18 @ 05:38), Max: 98.7 (07-04-18 @ 21:15)  HR: 87 (07-05-18 @ 05:38) (67 - 87)  BP: 126/66 (07-05-18 @ 05:38) (102/63 - 126/66)  RR: 18 (07-05-18 @ 05:38) (18 - 18)  SpO2: 97% (07-05-18 @ 05:38) (97% - 98%)  Wt(kg): --  ,   I&O's Summary    04 Jul 2018 07:01  -  05 Jul 2018 07:00  --------------------------------------------------------  IN: 340 mL / OUT: 0 mL / NET: 340 mL                 *****PHYSICAL EXAM:  GEN: A&O X 3 , comfortable  HEENT: NCAT, EOMI, MMM, no icterus  NECK: Supple, No JVD  CVS: S1S2 , regular , No M/R/G appreciated  PULM: CTA B/L,  no W/R/R appreciated  ABD.: soft. non tender, non distended,  bowel sounds present  Extrem: intact pulses , no edema noted  Derm: No rash or ecchymosis noted  PSYCH: normal mood, no depression, not anxious         *****LAB AND IMAGING:                         [All pertinent recent Imaging/Reports reviewed]         *****A S S E S S M E N T   A N D   P L A N :  85F with afib on Xarelto parkinson's dementia, COPD , falls, presenting after being found on the floor  VSS  cont current meds  check orthostatics  PT eval  cont AC  DCP    __________________________  A. TAHIR Jain.
- Patient seen and examined.  - In summary, patient is a 85 year old woman who presented s/p fall (2018 13:23)  - Today, patient is without complaints.         *****MEDICATIONS:    MEDICATIONS  (STANDING):  ALBUTerol    90 MICROgram(s) HFA Inhaler 1 Puff(s) Inhalation daily  aspirin enteric coated 81 milliGRAM(s) Oral daily  ATENolol  Tablet 25 milliGRAM(s) Oral daily  carbidopa/levodopa  25/250 1 Tablet(s) Oral daily  mirtazapine 7.5 milliGRAM(s) Oral daily  rivaroxaban 15 milliGRAM(s) Oral every 24 hours  simvastatin 10 milliGRAM(s) Oral at bedtime    MEDICATIONS  (PRN):           ***** REVIEW OF SYSTEM:  GEN: no fever, no chills, no pain  RESP: no SOB, no cough, no sputum  CVS: no chest pain, no palpitations, no edema  GI: no abdominal pain, no nausea, no vomiting, no constipation, no diarrhea  : no dysuria, no frequency  NEURO: no headache, no dizziness  PSYCH: no depression, not anxious  Derm : no itching, no rash         ***** VITAL SIGNS:  T(F): 98.8 (18 @ 05:41), Max: 99 (18 @ 18:28)  HR: 71 (18 @ 05:41) (58 - 71)  BP: 114/69 (18 @ 05:41) (114/69 - 147/62)  RR: 17 (18 @ 05:41) (17 - 20)  SpO2: 100% (18 @ 05:41) (97% - 100%)  Wt(kg): --  ,   I&O's Summary           *****PHYSICAL EXAM:  GEN: A&O X 3 , facial bruise, comfortable  HEENT: NCAT, EOMI, MMM, no icterus  NECK: Supple, No JVD  CVS: S1S2 , regular , No M/R/G appreciated  PULM: CTA B/L,  no W/R/R appreciated  ABD.: soft. non tender, non distended,  bowel sounds present  Extrem: intact pulses , no edema noted  Derm: No rash or ecchymosis noted  PSYCH: normal mood, no depression, not anxious         *****LAB AND IMAGIN.5   10.4  )-----------( 328      ( 2018 09:08 )             34.8               07-    141  |  101  |  18  ----------------------------<  91  4.3   |  28  |  1.19    Ca    8.8      2018 09:08  Phos  3.0     07-  Mg     2.0     -    TPro  7.4  /  Alb  3.7  /  TBili  1.0  /  DBili  x   /  AST  17  /  ALT  8<L>  /  AlkPhos  146<H>      PT/INR - ( 2018 09:08 )   PT: 17.4 sec;   INR: 1.58 ratio         PTT - ( 2018 09:08 )  PTT:33.1 sec       CARDIAC MARKERS ( 2018 09:08 )  x     / x     / 89 U/L / x     / x                  Urinalysis Basic - ( 2018 09:08 )    Color: Yellow / Appearance: Clear / S.023 / pH: x  Gluc: x / Ketone: Negative  / Bili: Negative / Urobili: Negative   Blood: x / Protein: Trace / Nitrite: Negative   Leuk Esterase: Negative / RBC: 3-5 /HPF / WBC 0-2 /HPF   Sq Epi: x / Non Sq Epi: OCC /HPF / Bacteria: x      [All pertinent recent Imaging/Reports reviewed]         *****A S S E S S M E N T   A N D   P L A N :  85F with afib on Xarelto parkinson's dementia, COPD , falls, presenting after being found on the floor  VSS  cont current meds  check orthostatics  PT eval  cont AC  tele  __________________________  ROWDY Jain D.O.
alert,  doing better  aide  at  bedside    REVIEW OF SYSTEMS:  GEN: no fever,    no chills  RESP: no SOB,   no cough  CVS: no chest pain,   no palpitations  GI: no abdominal pain,   no nausea,   no vomiting,   no constipation,   no diarrhea  : no dysuria,   no frequency  NEURO: no headache,   no dizziness  PSYCH: no depression,   not anxious  Derm : no rash    MEDICATIONS  (STANDING):  ALBUTerol    90 MICROgram(s) HFA Inhaler 1 Puff(s) Inhalation daily  aspirin enteric coated 81 milliGRAM(s) Oral daily  ATENolol  Tablet 25 milliGRAM(s) Oral daily  carbidopa/levodopa  25/250 1 Tablet(s) Oral daily  mirtazapine 7.5 milliGRAM(s) Oral daily  rivaroxaban 15 milliGRAM(s) Oral every 24 hours  simvastatin 10 milliGRAM(s) Oral at bedtime    MEDICATIONS  (PRN):      Vital Signs Last 24 Hrs  T(C): 37 (04 Jul 2018 04:56), Max: 37 (03 Jul 2018 20:33)  T(F): 98.6 (04 Jul 2018 04:56), Max: 98.6 (03 Jul 2018 20:33)  HR: 67 (04 Jul 2018 04:56) (67 - 90)  BP: 113/73 (04 Jul 2018 04:56) (97/61 - 116/75)  BP(mean): --  RR: 18 (04 Jul 2018 04:56) (18 - 18)  SpO2: 97% (04 Jul 2018 04:56) (97% - 99%)  CAPILLARY BLOOD GLUCOSE        I&O's Summary    03 Jul 2018 07:01  -  04 Jul 2018 07:00  --------------------------------------------------------  IN: 280 mL / OUT: 0 mL / NET: 280 mL        PHYSICAL EXAM:  HEAD:  Atraumatic, Normocephalic  NECK: Supple, No   JVD  CHEST/LUNG:   no     rales,     no,    rhonchi  HEART: Regular rate and rhythm;         murmur  ABDOMEN: Soft, Nontender, ;   EXTREMITIES:   no    edema  NEUROLOGY:  alert    LABS:                                  Consultant(s) Notes Reviewed:      Care Discussed with Consultants/Other Providers:
resting in bed    aide at bedside   looking better today, more  alert  REVIEW OF SYSTEMS:  GEN: no fever,    no chills  RESP: no SOB,   no cough  CVS: no chest pain,   no palpitations  GI: no abdominal pain,   no nausea,   no vomiting,   no constipation,   no diarrhea  : no dysuria,   no frequency  NEURO: no headache,   no dizziness  PSYCH: no depression,   not anxious  Derm : no rash    MEDICATIONS  (STANDING):  ALBUTerol    90 MICROgram(s) HFA Inhaler 1 Puff(s) Inhalation daily  aspirin enteric coated 81 milliGRAM(s) Oral daily  ATENolol  Tablet 25 milliGRAM(s) Oral daily  carbidopa/levodopa  25/250 1 Tablet(s) Oral daily  mirtazapine 7.5 milliGRAM(s) Oral daily  rivaroxaban 15 milliGRAM(s) Oral every 24 hours  simvastatin 10 milliGRAM(s) Oral at bedtime    MEDICATIONS  (PRN):      Vital Signs Last 24 Hrs  T(C): 37.1 (2018 05:41), Max: 37.2 (2018 18:28)  T(F): 98.8 (2018 05:41), Max: 99 (2018 18:28)  HR: 71 (2018 05:41) (58 - 71)  BP: 114/69 (2018 05:41) (114/69 - 144/63)  BP(mean): --  RR: 17 (2018 05:41) (17 - 20)  SpO2: 100% (2018 05:41) (97% - 100%)  CAPILLARY BLOOD GLUCOSE        I&O's Summary      PHYSICAL EXAM:  HEAD:  Atraumatic, Normocephalic  NECK: Supple, No   JVD  CHEST/LUNG:   no     rales,     no,    rhonchi  HEART: Regular rate and rhythm;         murmur  ABDOMEN: Soft, Nontender, ;   EXTREMITIES:        edema  NEUROLOGY:  alert    LABS:                        11.5   10.4  )-----------( 328      ( 2018 09:08 )             34.8     07-02    141  |  101  |  18  ----------------------------<  91  4.3   |  28  |  1.19    Ca    8.8      2018 09:08  Phos  3.0     07-02  Mg     2.0         TPro  7.4  /  Alb  3.7  /  TBili  1.0  /  DBili  x   /  AST  17  /  ALT  8<L>  /  AlkPhos  146<H>      PT/INR - ( 2018 09:08 )   PT: 17.4 sec;   INR: 1.58 ratio         PTT - ( 2018 09:08 )  PTT:33.1 sec  CARDIAC MARKERS ( 2018 09:08 )  x     / x     / 89 U/L / x     / x          Urinalysis Basic - ( 2018 09:08 )    Color: Yellow / Appearance: Clear / S.023 / pH: x  Gluc: x / Ketone: Negative  / Bili: Negative / Urobili: Negative   Blood: x / Protein: Trace / Nitrite: Negative   Leuk Esterase: Negative / RBC: 3-5 /HPF / WBC 0-2 /HPF   Sq Epi: x / Non Sq Epi: OCC /HPF / Bacteria: x           @ 09:08  4.1  21              Consultant(s) Notes Reviewed:      Care Discussed with Consultants/Other Providers:
resting, in  bed    REVIEW OF SYSTEMS:  GEN: no fever,    no chills  RESP: no SOB,   no cough  CVS: no chest pain,   no palpitations  GI: no abdominal pain,   no nausea,   no vomiting,   no constipation,   no diarrhea  : no dysuria,   no frequency  NEURO: no headache,   no dizziness  PSYCH: no depression,   not anxious  Derm : no rash    MEDICATIONS  (STANDING):  ALBUTerol    90 MICROgram(s) HFA Inhaler 1 Puff(s) Inhalation daily  aspirin enteric coated 81 milliGRAM(s) Oral daily  ATENolol  Tablet 25 milliGRAM(s) Oral daily  carbidopa/levodopa  25/250 1 Tablet(s) Oral daily  mirtazapine 7.5 milliGRAM(s) Oral daily  rivaroxaban 15 milliGRAM(s) Oral every 24 hours  simvastatin 10 milliGRAM(s) Oral at bedtime    MEDICATIONS  (PRN):      Vital Signs Last 24 Hrs  T(C): 36.8 (05 Jul 2018 05:38), Max: 37.1 (04 Jul 2018 21:15)  T(F): 98.3 (05 Jul 2018 05:38), Max: 98.7 (04 Jul 2018 21:15)  HR: 87 (05 Jul 2018 05:38) (67 - 87)  BP: 126/66 (05 Jul 2018 05:38) (102/63 - 126/66)  BP(mean): --  RR: 18 (05 Jul 2018 05:38) (18 - 18)  SpO2: 97% (05 Jul 2018 05:38) (97% - 98%)  CAPILLARY BLOOD GLUCOSE        I&O's Summary    04 Jul 2018 07:01  -  05 Jul 2018 07:00  --------------------------------------------------------  IN: 340 mL / OUT: 0 mL / NET: 340 mL        PHYSICAL EXAM:  HEAD:  Atraumatic, Normocephalic  NECK: Supple, No   JVD  CHEST/LUNG:   no     rales,     no,    rhonchi  HEART: Regular rate and rhythm;         murmur  ABDOMEN: Soft, Nontender, ;   EXTREMITIES:        edema  NEUROLOGY:   dementia    LABS:                                  Consultant(s) Notes Reviewed:      Care Discussed with Consultants/Other Providers:

## 2018-07-05 NOTE — CHART NOTE - NSCHARTNOTEFT_GEN_A_CORE
Request from Dr. Aguilar to facilitate patient discharge.  Medication reconciliation reviewed, revised, and resolved with Dr. Aguilar, who has medically cleared patient for discharge with follow-up as advised.  Please refer to discharge note for detailed hospital course.

## 2018-07-06 ENCOUNTER — APPOINTMENT (OUTPATIENT)
Dept: WOUND CARE | Facility: CLINIC | Age: 83
End: 2018-07-06
Payer: MEDICARE

## 2018-07-06 VITALS
HEIGHT: 61 IN | BODY MASS INDEX: 36.25 KG/M2 | WEIGHT: 192 LBS | SYSTOLIC BLOOD PRESSURE: 120 MMHG | HEART RATE: 67 BPM | DIASTOLIC BLOOD PRESSURE: 79 MMHG

## 2018-07-06 DIAGNOSIS — L89.210 PRESSURE ULCER OF RIGHT HIP, UNSTAGEABLE: ICD-10-CM

## 2018-07-06 PROBLEM — I48.91 UNSPECIFIED ATRIAL FIBRILLATION: Chronic | Status: ACTIVE | Noted: 2018-07-02

## 2018-07-06 PROCEDURE — 99213 OFFICE O/P EST LOW 20 MIN: CPT

## 2018-07-07 LAB
CULTURE RESULTS: SIGNIFICANT CHANGE UP
CULTURE RESULTS: SIGNIFICANT CHANGE UP
SPECIMEN SOURCE: SIGNIFICANT CHANGE UP
SPECIMEN SOURCE: SIGNIFICANT CHANGE UP

## 2018-07-12 ENCOUNTER — APPOINTMENT (OUTPATIENT)
Dept: WOUND CARE | Facility: CLINIC | Age: 83
End: 2018-07-12
Payer: MEDICARE

## 2018-07-12 ENCOUNTER — RESULT REVIEW (OUTPATIENT)
Age: 83
End: 2018-07-12

## 2018-07-12 VITALS
WEIGHT: 192 LBS | BODY MASS INDEX: 36.25 KG/M2 | HEIGHT: 61 IN | SYSTOLIC BLOOD PRESSURE: 97 MMHG | DIASTOLIC BLOOD PRESSURE: 59 MMHG | HEART RATE: 65 BPM

## 2018-07-12 DIAGNOSIS — Z78.9 OTHER SPECIFIED HEALTH STATUS: ICD-10-CM

## 2018-07-12 PROCEDURE — 11042 DBRDMT SUBQ TIS 1ST 20SQCM/<: CPT

## 2018-07-16 PROBLEM — I48.91 ATRIAL FIBRILLATION: Status: ACTIVE | Noted: 2018-06-08

## 2018-07-19 RX ORDER — COLLAGENASE SANTYL 250 [ARB'U]/G
250 OINTMENT TOPICAL DAILY
Qty: 1 | Refills: 5 | Status: ACTIVE | COMMUNITY
Start: 2018-07-19 | End: 1900-01-01

## 2018-07-23 ENCOUNTER — APPOINTMENT (OUTPATIENT)
Dept: VASCULAR SURGERY | Facility: CLINIC | Age: 83
End: 2018-07-23
Payer: MEDICARE

## 2018-07-23 PROCEDURE — 93922 UPR/L XTREMITY ART 2 LEVELS: CPT

## 2018-07-23 PROCEDURE — 93970 EXTREMITY STUDY: CPT

## 2018-07-26 ENCOUNTER — APPOINTMENT (OUTPATIENT)
Dept: WOUND CARE | Facility: CLINIC | Age: 83
End: 2018-07-26
Payer: MEDICARE

## 2018-07-26 ENCOUNTER — RESULT REVIEW (OUTPATIENT)
Age: 83
End: 2018-07-26

## 2018-07-26 VITALS
BODY MASS INDEX: 36.25 KG/M2 | DIASTOLIC BLOOD PRESSURE: 69 MMHG | HEART RATE: 73 BPM | WEIGHT: 192 LBS | TEMPERATURE: 98 F | SYSTOLIC BLOOD PRESSURE: 101 MMHG | RESPIRATION RATE: 16 BRPM | HEIGHT: 61 IN

## 2018-07-26 DIAGNOSIS — L89.210 PRESSURE ULCER OF RIGHT HIP, UNSTAGEABLE: ICD-10-CM

## 2018-07-26 DIAGNOSIS — L89.152 PRESSURE ULCER OF SACRAL REGION, STAGE 2: ICD-10-CM

## 2018-07-26 DIAGNOSIS — L89.620 PRESSURE ULCER OF LEFT HEEL, UNSTAGEABLE: ICD-10-CM

## 2018-07-26 PROCEDURE — 99213 OFFICE O/P EST LOW 20 MIN: CPT

## 2018-07-30 PROBLEM — L89.620 DECUBITUS ULCER OF LEFT HEEL, UNSTAGEABLE: Status: ACTIVE | Noted: 2018-06-08

## 2018-07-30 PROBLEM — L89.210: Status: ACTIVE | Noted: 2018-07-06

## 2018-07-30 PROBLEM — L89.210: Status: RESOLVED | Noted: 2018-07-06 | Resolved: 2018-07-30

## 2018-07-31 ENCOUNTER — INPATIENT (INPATIENT)
Facility: HOSPITAL | Age: 83
LOS: 12 days | Discharge: NURSING, REHAB OR LONG-TERM CARE | DRG: 987 | End: 2018-08-13
Attending: FAMILY MEDICINE | Admitting: FAMILY MEDICINE
Payer: MEDICARE

## 2018-07-31 VITALS
WEIGHT: 130.07 LBS | OXYGEN SATURATION: 91 % | SYSTOLIC BLOOD PRESSURE: 98 MMHG | TEMPERATURE: 98 F | RESPIRATION RATE: 18 BRPM | DIASTOLIC BLOOD PRESSURE: 62 MMHG | HEART RATE: 68 BPM

## 2018-07-31 DIAGNOSIS — Z96.651 PRESENCE OF RIGHT ARTIFICIAL KNEE JOINT: Chronic | ICD-10-CM

## 2018-07-31 DIAGNOSIS — L89.899 PRESSURE ULCER OF OTHER SITE, UNSPECIFIED STAGE: ICD-10-CM

## 2018-07-31 DIAGNOSIS — Z29.9 ENCOUNTER FOR PROPHYLACTIC MEASURES, UNSPECIFIED: ICD-10-CM

## 2018-07-31 DIAGNOSIS — I48.91 UNSPECIFIED ATRIAL FIBRILLATION: ICD-10-CM

## 2018-07-31 DIAGNOSIS — G20 PARKINSON'S DISEASE: ICD-10-CM

## 2018-07-31 DIAGNOSIS — E78.5 HYPERLIPIDEMIA, UNSPECIFIED: ICD-10-CM

## 2018-07-31 DIAGNOSIS — L89.613 PRESSURE ULCER OF RIGHT HEEL, STAGE 3: ICD-10-CM

## 2018-07-31 DIAGNOSIS — Z71.89 OTHER SPECIFIED COUNSELING: ICD-10-CM

## 2018-07-31 DIAGNOSIS — E03.9 HYPOTHYROIDISM, UNSPECIFIED: ICD-10-CM

## 2018-07-31 LAB
ALBUMIN SERPL ELPH-MCNC: 2.9 G/DL — LOW (ref 3.3–5)
ALP SERPL-CCNC: 145 U/L — HIGH (ref 40–120)
ALT FLD-CCNC: 14 U/L — SIGNIFICANT CHANGE UP (ref 12–78)
ANION GAP SERPL CALC-SCNC: 6 MMOL/L — SIGNIFICANT CHANGE UP (ref 5–17)
AST SERPL-CCNC: 19 U/L — SIGNIFICANT CHANGE UP (ref 15–37)
BASOPHILS # BLD AUTO: 0.03 K/UL — SIGNIFICANT CHANGE UP (ref 0–0.2)
BASOPHILS NFR BLD AUTO: 0.3 % — SIGNIFICANT CHANGE UP (ref 0–2)
BILIRUB SERPL-MCNC: 0.7 MG/DL — SIGNIFICANT CHANGE UP (ref 0.2–1.2)
BUN SERPL-MCNC: 20 MG/DL — SIGNIFICANT CHANGE UP (ref 7–23)
CALCIUM SERPL-MCNC: 8.8 MG/DL — SIGNIFICANT CHANGE UP (ref 8.5–10.1)
CHLORIDE SERPL-SCNC: 104 MMOL/L — SIGNIFICANT CHANGE UP (ref 96–108)
CO2 SERPL-SCNC: 31 MMOL/L — SIGNIFICANT CHANGE UP (ref 22–31)
CREAT SERPL-MCNC: 1.2 MG/DL — SIGNIFICANT CHANGE UP (ref 0.5–1.3)
CRP SERPL-MCNC: 4.09 MG/DL — HIGH (ref 0–0.4)
EOSINOPHIL # BLD AUTO: 0.12 K/UL — SIGNIFICANT CHANGE UP (ref 0–0.5)
EOSINOPHIL NFR BLD AUTO: 1.2 % — SIGNIFICANT CHANGE UP (ref 0–6)
ERYTHROCYTE [SEDIMENTATION RATE] IN BLOOD: 80 MM/HR — HIGH (ref 0–20)
GLUCOSE SERPL-MCNC: 97 MG/DL — SIGNIFICANT CHANGE UP (ref 70–99)
HCT VFR BLD CALC: 36.2 % — SIGNIFICANT CHANGE UP (ref 34.5–45)
HGB BLD-MCNC: 11.8 G/DL — SIGNIFICANT CHANGE UP (ref 11.5–15.5)
IMM GRANULOCYTES NFR BLD AUTO: 0.3 % — SIGNIFICANT CHANGE UP (ref 0–1.5)
LYMPHOCYTES # BLD AUTO: 2.3 K/UL — SIGNIFICANT CHANGE UP (ref 1–3.3)
LYMPHOCYTES # BLD AUTO: 23.4 % — SIGNIFICANT CHANGE UP (ref 13–44)
MCHC RBC-ENTMCNC: 29.2 PG — SIGNIFICANT CHANGE UP (ref 27–34)
MCHC RBC-ENTMCNC: 32.6 GM/DL — SIGNIFICANT CHANGE UP (ref 32–36)
MCV RBC AUTO: 89.6 FL — SIGNIFICANT CHANGE UP (ref 80–100)
MONOCYTES # BLD AUTO: 0.78 K/UL — SIGNIFICANT CHANGE UP (ref 0–0.9)
MONOCYTES NFR BLD AUTO: 7.9 % — SIGNIFICANT CHANGE UP (ref 2–14)
NEUTROPHILS # BLD AUTO: 6.58 K/UL — SIGNIFICANT CHANGE UP (ref 1.8–7.4)
NEUTROPHILS NFR BLD AUTO: 66.9 % — SIGNIFICANT CHANGE UP (ref 43–77)
PLATELET # BLD AUTO: 414 K/UL — HIGH (ref 150–400)
POTASSIUM SERPL-MCNC: 4.1 MMOL/L — SIGNIFICANT CHANGE UP (ref 3.5–5.3)
POTASSIUM SERPL-SCNC: 4.1 MMOL/L — SIGNIFICANT CHANGE UP (ref 3.5–5.3)
PROT SERPL-MCNC: 7.8 G/DL — SIGNIFICANT CHANGE UP (ref 6–8.3)
RBC # BLD: 4.04 M/UL — SIGNIFICANT CHANGE UP (ref 3.8–5.2)
RBC # FLD: 15 % — HIGH (ref 10.3–14.5)
SODIUM SERPL-SCNC: 141 MMOL/L — SIGNIFICANT CHANGE UP (ref 135–145)
WBC # BLD: 9.84 K/UL — SIGNIFICANT CHANGE UP (ref 3.8–10.5)
WBC # FLD AUTO: 9.84 K/UL — SIGNIFICANT CHANGE UP (ref 3.8–10.5)

## 2018-07-31 PROCEDURE — 73630 X-RAY EXAM OF FOOT: CPT | Mod: 26,50

## 2018-07-31 PROCEDURE — 71045 X-RAY EXAM CHEST 1 VIEW: CPT | Mod: 26

## 2018-07-31 PROCEDURE — 99285 EMERGENCY DEPT VISIT HI MDM: CPT

## 2018-07-31 PROCEDURE — 99223 1ST HOSP IP/OBS HIGH 75: CPT

## 2018-07-31 PROCEDURE — 99223 1ST HOSP IP/OBS HIGH 75: CPT | Mod: AI,GC

## 2018-07-31 RX ORDER — ATENOLOL 25 MG/1
25 TABLET ORAL DAILY
Refills: 0 | Status: DISCONTINUED | OUTPATIENT
Start: 2018-07-31 | End: 2018-08-13

## 2018-07-31 RX ORDER — LEVOTHYROXINE SODIUM 125 MCG
25 TABLET ORAL DAILY
Refills: 0 | Status: DISCONTINUED | OUTPATIENT
Start: 2018-07-31 | End: 2018-08-13

## 2018-07-31 RX ORDER — VANCOMYCIN HCL 1 G
1000 VIAL (EA) INTRAVENOUS ONCE
Refills: 0 | Status: COMPLETED | OUTPATIENT
Start: 2018-07-31 | End: 2018-07-31

## 2018-07-31 RX ORDER — MIRTAZAPINE 45 MG/1
7.5 TABLET, ORALLY DISINTEGRATING ORAL DAILY
Refills: 0 | Status: DISCONTINUED | OUTPATIENT
Start: 2018-07-31 | End: 2018-08-09

## 2018-07-31 RX ORDER — ACETAMINOPHEN 500 MG
650 TABLET ORAL EVERY 6 HOURS
Refills: 0 | Status: DISCONTINUED | OUTPATIENT
Start: 2018-07-31 | End: 2018-08-13

## 2018-07-31 RX ORDER — CARBIDOPA AND LEVODOPA 25; 100 MG/1; MG/1
1 TABLET ORAL THREE TIMES A DAY
Refills: 0 | Status: DISCONTINUED | OUTPATIENT
Start: 2018-07-31 | End: 2018-08-13

## 2018-07-31 RX ORDER — SIMVASTATIN 20 MG/1
10 TABLET, FILM COATED ORAL AT BEDTIME
Refills: 0 | Status: DISCONTINUED | OUTPATIENT
Start: 2018-07-31 | End: 2018-08-13

## 2018-07-31 RX ORDER — RASAGILINE 0.5 MG/1
0.5 TABLET ORAL DAILY
Refills: 0 | Status: DISCONTINUED | OUTPATIENT
Start: 2018-07-31 | End: 2018-08-13

## 2018-07-31 RX ORDER — RIVAROXABAN 15 MG-20MG
15 KIT ORAL EVERY 24 HOURS
Refills: 0 | Status: DISCONTINUED | OUTPATIENT
Start: 2018-07-31 | End: 2018-08-08

## 2018-07-31 RX ORDER — COLLAGENASE CLOSTRIDIUM HIST. 250 UNIT/G
1 OINTMENT (GRAM) TOPICAL DAILY
Refills: 0 | Status: DISCONTINUED | OUTPATIENT
Start: 2018-07-31 | End: 2018-08-13

## 2018-07-31 RX ORDER — VANCOMYCIN HCL 1 G
1000 VIAL (EA) INTRAVENOUS EVERY 12 HOURS
Refills: 0 | Status: DISCONTINUED | OUTPATIENT
Start: 2018-07-31 | End: 2018-08-06

## 2018-07-31 RX ORDER — ENOXAPARIN SODIUM 100 MG/ML
40 INJECTION SUBCUTANEOUS DAILY
Refills: 0 | Status: DISCONTINUED | OUTPATIENT
Start: 2018-07-31 | End: 2018-07-31

## 2018-07-31 RX ADMIN — RIVAROXABAN 15 MILLIGRAM(S): KIT at 18:25

## 2018-07-31 RX ADMIN — Medication 1 APPLICATION(S): at 18:28

## 2018-07-31 RX ADMIN — SIMVASTATIN 10 MILLIGRAM(S): 20 TABLET, FILM COATED ORAL at 23:14

## 2018-07-31 RX ADMIN — Medication 250 MILLIGRAM(S): at 18:23

## 2018-07-31 RX ADMIN — CARBIDOPA AND LEVODOPA 1 TABLET(S): 25; 100 TABLET ORAL at 23:14

## 2018-07-31 RX ADMIN — Medication 1000 MILLIGRAM(S): at 13:40

## 2018-07-31 RX ADMIN — Medication 250 MILLIGRAM(S): at 12:20

## 2018-07-31 NOTE — H&P ADULT - PROBLEM SELECTOR PLAN 1
-Admit to medicine  -Continue IV Vancomycin  -Dr. Álvarez, wound care, recs appreciated. Continue Collagenase, protective dressing  -Plan for MRI of feet after consult obtained from HCP.

## 2018-07-31 NOTE — ED ADULT NURSE NOTE - NSIMPLEMENTINTERV_GEN_ALL_ED
Implemented All Universal Safety Interventions:  [place] to call system. Call bell, personal items and telephone within reach. Instruct patient to call for assistance. Room bathroom lighting operational. Non-slip footwear when patient is off stretcher. Physically safe environment: no spills, clutter or unnecessary equipment. Stretcher in lowest position, wheels locked, appropriate side rails in place. Implemented All Fall with Harm Risk Interventions:  [place] to call system. Call bell, personal items and telephone within reach. Instruct patient to call for assistance. Room bathroom lighting operational. Non-slip footwear when patient is off stretcher. Physically safe environment: no spills, clutter or unnecessary equipment. Stretcher in lowest position, wheels locked, appropriate side rails in place. Provide visual cue, wrist band, yellow gown, etc. Monitor gait and stability. Monitor for mental status changes and reorient to person, place, and time. Review medications for side effects contributing to fall risk. Reinforce activity limits and safety measures with patient and family. Provide visual clues: red socks.

## 2018-07-31 NOTE — ED PROVIDER NOTE - OBJECTIVE STATEMENT
86 y/o female pmh htn, afib, parkinsons, CVA, bedbound from nursing home sent for worsening chronic wounds to b/l feet. As per family, pt has visiting nurse who changes dressing on b/l feet wounds. Nurse saw wound to R heel today looked worse compared to last week and rec for pt to be brought to ER. As per family, wound formed a blister last week and now it is open and draining clear fluid. Denies redness to skin, n/v, fever or chills. Pt denies pain to area.

## 2018-07-31 NOTE — ED ADULT NURSE NOTE - OBJECTIVE STATEMENT
Pt to ED via ambulance, sent from [facility] for evaluation of heel wounds.  Per family at bedside, pt is in final stages of Parkinsons and is bed-bound.  Pt awake, confused, does not answer most questions appropriately.  Per family, pt has had wounds to each heel for 7 to 9 months which have been treated by wound care center in [place].  Pt has also had nurse checking on wounds and today felt wounds worsened and sent pt to ED.  Family is also requesting wound care consult for [facility] and wish to transfer care of pt to [facility].  Pt has multiple pressure ulcers to all areas of B/L heels, with blood and discharge as well as areas of black/necrosis.  Pt has not had fevers or chills.

## 2018-07-31 NOTE — ED PROVIDER NOTE - ATTENDING CONTRIBUTION TO CARE
86 yo female hx of htn, afib, parkinson, CVA, bed bound BIBEMS c/o b/l pressure ulcer wounds to b/l heels, getting worse, sent in by home visiting nurse.  Here for wound care evaluation.      Gen: nonverbal  Head/eyes: NC/AT, PERRL, EOMI, normal lids/conjunctiva, no scleral icterus  Neck: supple, no tenderness/meningismus/JVD, Trachea midline  Pulm: Bilateral clear BS, normal resp effort, no wheeze/stridor/retractions  CV: RRR, no M/R/G, +2 dist pulses (radial, pedal DP/PT, popliteal)  Abd: soft, NT/ND, +BS, no guarding/rebound tenderness  Musculoskeletal: no edema/erythema/cyanosis, FROM in all extremities, no C/T/L spine ttp  Skin: +b/l stage 3 pressure ulcer wound b/l heel, +eschar on left heel, +ruptured blister wound on right heel, erythematous    IV abx, r/o osteomyelitis, Xray's, Wound care consult, admit

## 2018-07-31 NOTE — H&P ADULT - HISTORY OF PRESENT ILLNESS
85F with PMH HTN, a fib on Xarelto, Parkinson's Dx, Hx of CVA, bedbound presents from [facility] for evaluation of bilateral heel wounds. Patient A&O1-2 and doesn't answer questions appropriately, history obtained from family at bedside. Patient reports left sided ankle pain. Per family, patient had bilateral wounds for 7-9 months, being treated by wound care center in [place]. Visiting nurse assessed wounds today and stated R heel looked worse compared to last week and sent patient to the ED. Visiting nurse changed dressing on bilateral feet wounds. Family also requesting patient to become DNR and discuss advance directives.     In the ED: temp 98.3, HR 68, BP 98/62, RR 18, SpO2 91% RA. ESR 80, BUN/Cr: 20/1.2. Chest xray: no active disease. Xray right foot: No acute fracture, dislocation, or destructive lesions evident. Xray left foot: No acute fracture, dislocation, or destructive bone lesions evident. Soft tissue ulcer may be present inferior to the left calcaneus visible on the oblique view. Received IV Vancomycin. Dr. Álvarez, Wound Care, evaluated patient in the ED.

## 2018-07-31 NOTE — H&P ADULT - ASSESSMENT
85F with PMH HTN, a fib on Xarelto, Parkinson's Dx, Hx of CVA, bedbound presents from [facility] admitted for bilateral heel wounds. 85F with PMH HTN, a fib on Xarelto, Parkinson's Dx, Hx of afib, CVA, bedbound presents from [facility] admitted for bilateral heel wounds, rule out osteomyelitis.

## 2018-07-31 NOTE — H&P ADULT - NSHPPHYSICALEXAM_GEN_ALL_CORE
Height (cm): 154.94 ( @ 12:34)  Weight (kg): 87.1 ( @ 12:34)  BMI (kg/m2): 36.3 ( @ 12:34)  BSA (m2): 1.86 ( @ 12:34)  Vital Signs Last 24 Hrs  T(C): 36.8 (2018 11:20), Max: 36.8 (2018 11:20)  T(F): 98.3 (2018 11:20), Max: 98.3 (2018 11:20)  HR: 68 (2018 11:20) (68 - 68)  BP: 98/62 (2018 11:20) (98/62 - 98/62)  RR: 18 (2018 11:20) (18 - 18)  SpO2: 91% (2018 11:20) (91% - 91%)  [X ] room air   [ ] 02    PHYSICAL EXAM:  GENERAL:  elderly frail appearing female in NAD  HEAD:  AT/NC  ENMT: EOMI, PERRL, dry mucus membranes  NECK:  supple  NERVOUS SYSTEM:  Alert & Oriented X1-2 (able to state name, -only day, month, location - hospital in [place], unable to state current year), unable to lift lower extremities bilaterally, grossly moves upper extremities, tremor noted bilaterally  CHEST/LUNG: diminished breath sounds at bases  HEART:  Regular rate and rhythm, No murmurs, rubs, or gallops  ABDOMEN:  soft, nontender, nondistended, positive bowel sounds   EXTREMITIES: No clubbing, cyanosis or edema  SKIN: bilateral ulcers at heels of feet, necrotic tissue and fresh tissue at site, serosanguinous fluid draining at site, no erythema/cellulitis surround heels

## 2018-07-31 NOTE — ED PROVIDER NOTE - MEDICAL DECISION MAKING DETAILS
84 y/o female, bedbound w/ chronic pressure ulcers to b/l heels, R wound worse as per visiting nurse, sent to ER for eval- labs, esr, crp ,xray, wound care consult, reassess

## 2018-07-31 NOTE — CONSULT NOTE ADULT - PROBLEM SELECTOR RECOMMENDATION 9
Collagenase, protective dressing. Collagenase, protective dressing. MRI of feet when patient is stable

## 2018-07-31 NOTE — H&P ADULT - ATTENDING COMMENTS
Case discussed with resident. Agree with exam and plan as above.   Patients son Ovidio Ware at bedside, discussed advance directives and son agrees for patient to be DNR/DNI.

## 2018-07-31 NOTE — H&P ADULT - PROBLEM SELECTOR PLAN 6
IMPROVE VTE Individual Risk Assessment          RISK                                                          Points  [  ] Previous VTE                                                3  [  ] Thrombophilia                                             2  [ x ] Lower limb paralysis                                   2        (unable to hold up >15 seconds)    [  ] Current Cancer                                             2         (within 6 months)  [  ] Immobilization > 24 hrs                              1  [  ] ICU/CCU stay > 24 hours                             1  [x  ] Age > 60                                                         1    IMPROVE VTE Score: 3  DVT prophylaxis: on Xarelto  Fall precautions

## 2018-07-31 NOTE — ED ADULT NURSE NOTE - PMH
Afib    Dementia    Hyperlipidemia    Hypertension    Hypothyroidism    Parkinson disease Afib    Dementia    Hyperlipidemia    Hypertension    Hypothyroidism    Parkinson disease    Stroke

## 2018-08-01 LAB
ANION GAP SERPL CALC-SCNC: 8 MMOL/L — SIGNIFICANT CHANGE UP (ref 5–17)
BUN SERPL-MCNC: 22 MG/DL — SIGNIFICANT CHANGE UP (ref 7–23)
CALCIUM SERPL-MCNC: 8.6 MG/DL — SIGNIFICANT CHANGE UP (ref 8.5–10.1)
CHLORIDE SERPL-SCNC: 106 MMOL/L — SIGNIFICANT CHANGE UP (ref 96–108)
CO2 SERPL-SCNC: 29 MMOL/L — SIGNIFICANT CHANGE UP (ref 22–31)
CREAT SERPL-MCNC: 1.2 MG/DL — SIGNIFICANT CHANGE UP (ref 0.5–1.3)
GLUCOSE SERPL-MCNC: 92 MG/DL — SIGNIFICANT CHANGE UP (ref 70–99)
HCT VFR BLD CALC: 32 % — LOW (ref 34.5–45)
HGB BLD-MCNC: 10.6 G/DL — LOW (ref 11.5–15.5)
MCHC RBC-ENTMCNC: 29.5 PG — SIGNIFICANT CHANGE UP (ref 27–34)
MCHC RBC-ENTMCNC: 33.1 GM/DL — SIGNIFICANT CHANGE UP (ref 32–36)
MCV RBC AUTO: 89.1 FL — SIGNIFICANT CHANGE UP (ref 80–100)
NRBC # BLD: 0 /100 WBCS — SIGNIFICANT CHANGE UP (ref 0–0)
PLATELET # BLD AUTO: 400 K/UL — SIGNIFICANT CHANGE UP (ref 150–400)
POTASSIUM SERPL-MCNC: 4 MMOL/L — SIGNIFICANT CHANGE UP (ref 3.5–5.3)
POTASSIUM SERPL-SCNC: 4 MMOL/L — SIGNIFICANT CHANGE UP (ref 3.5–5.3)
RBC # BLD: 3.59 M/UL — LOW (ref 3.8–5.2)
RBC # FLD: 14.8 % — HIGH (ref 10.3–14.5)
SODIUM SERPL-SCNC: 143 MMOL/L — SIGNIFICANT CHANGE UP (ref 135–145)
VANCOMYCIN TROUGH SERPL-MCNC: 16.4 UG/ML — SIGNIFICANT CHANGE UP (ref 10–20)
WBC # BLD: 13.02 K/UL — HIGH (ref 3.8–10.5)
WBC # FLD AUTO: 13.02 K/UL — HIGH (ref 3.8–10.5)

## 2018-08-01 PROCEDURE — 99233 SBSQ HOSP IP/OBS HIGH 50: CPT

## 2018-08-01 PROCEDURE — 73720 MRI LWR EXTREMITY W/O&W/DYE: CPT | Mod: 26,RT

## 2018-08-01 PROCEDURE — 73700 CT LOWER EXTREMITY W/O DYE: CPT | Mod: 26,RT

## 2018-08-01 RX ORDER — MULTIVIT-MIN/FERROUS GLUCONATE 9 MG/15 ML
1 LIQUID (ML) ORAL DAILY
Refills: 0 | Status: CANCELLED | OUTPATIENT
Start: 2018-08-01 | End: 2018-08-13

## 2018-08-01 RX ORDER — ASCORBIC ACID 60 MG
500 TABLET,CHEWABLE ORAL
Refills: 0 | Status: CANCELLED | OUTPATIENT
Start: 2018-08-01 | End: 2018-08-13

## 2018-08-01 RX ORDER — PIPERACILLIN AND TAZOBACTAM 4; .5 G/20ML; G/20ML
3.38 INJECTION, POWDER, LYOPHILIZED, FOR SOLUTION INTRAVENOUS ONCE
Refills: 0 | Status: COMPLETED | OUTPATIENT
Start: 2018-08-01 | End: 2018-08-01

## 2018-08-01 RX ORDER — PIPERACILLIN AND TAZOBACTAM 4; .5 G/20ML; G/20ML
3.38 INJECTION, POWDER, LYOPHILIZED, FOR SOLUTION INTRAVENOUS EVERY 8 HOURS
Refills: 0 | Status: DISCONTINUED | OUTPATIENT
Start: 2018-08-01 | End: 2018-08-10

## 2018-08-01 RX ADMIN — SIMVASTATIN 10 MILLIGRAM(S): 20 TABLET, FILM COATED ORAL at 21:27

## 2018-08-01 RX ADMIN — MIRTAZAPINE 7.5 MILLIGRAM(S): 45 TABLET, ORALLY DISINTEGRATING ORAL at 14:02

## 2018-08-01 RX ADMIN — Medication 250 MILLIGRAM(S): at 21:27

## 2018-08-01 RX ADMIN — PIPERACILLIN AND TAZOBACTAM 200 GRAM(S): 4; .5 INJECTION, POWDER, LYOPHILIZED, FOR SOLUTION INTRAVENOUS at 10:14

## 2018-08-01 RX ADMIN — Medication 1 APPLICATION(S): at 14:05

## 2018-08-01 RX ADMIN — PIPERACILLIN AND TAZOBACTAM 25 GRAM(S): 4; .5 INJECTION, POWDER, LYOPHILIZED, FOR SOLUTION INTRAVENOUS at 18:52

## 2018-08-01 RX ADMIN — RIVAROXABAN 15 MILLIGRAM(S): KIT at 18:50

## 2018-08-01 RX ADMIN — CARBIDOPA AND LEVODOPA 1 TABLET(S): 25; 100 TABLET ORAL at 14:02

## 2018-08-01 RX ADMIN — Medication 25 MICROGRAM(S): at 05:41

## 2018-08-01 RX ADMIN — Medication 250 MILLIGRAM(S): at 05:40

## 2018-08-01 RX ADMIN — RASAGILINE 0.5 MILLIGRAM(S): 0.5 TABLET ORAL at 14:10

## 2018-08-01 RX ADMIN — CARBIDOPA AND LEVODOPA 1 TABLET(S): 25; 100 TABLET ORAL at 21:27

## 2018-08-01 RX ADMIN — CARBIDOPA AND LEVODOPA 1 TABLET(S): 25; 100 TABLET ORAL at 05:41

## 2018-08-01 NOTE — PROGRESS NOTE ADULT - PROBLEM SELECTOR PLAN 5
chronic, stable  -Rate controlled on Atenolol  -Anticoagulated with Xarelto chronic, stable  -Rate controlled on Atenolol 25 mg PO daily  -Anticoagulated with 15 mg Xarelto Q24H

## 2018-08-01 NOTE — PROGRESS NOTE ADULT - PROBLEM SELECTOR PLAN 2
chronic, stable  - -Chronic disease, presently stable. Patient bedbound  -continue home med Carbadopa/Levadopa 25/100 mg PO TID  -Continue home med rasagiline (Azilect) 0.5 mg PO daily

## 2018-08-01 NOTE — PROGRESS NOTE ADULT - PROBLEM SELECTOR PLAN 1
-Continue IV Vancomycin 1000 mg Q12  -F/U Vanc trough  -Start Zosyn 3.375 mg IV Q8H   -Continue collagenese ointment in bilateral heels  -Continue off-loading of heels bilaterally    -Dr. Álvarez, wound care, recs appreciated. Continue Collagenase, protective dressing  -Plan for MRI of feet after consult obtained from HCP.

## 2018-08-01 NOTE — PROGRESS NOTE ADULT - PROBLEM SELECTOR PLAN 3
chronic, stable  -continue home medications -Chronice and stable  -Continue home medication of 25 mcg Levothyroxine PO daily

## 2018-08-01 NOTE — PROGRESS NOTE ADULT - SUBJECTIVE AND OBJECTIVE BOX
84yo female seen bedside during rounds for bilateral pressure heel wounds with resident. Patient unable to communicate how long she's had the wounds and was sent by the nursing home.    Vital Signs Last 24 Hrs  T(C): 36.7 (01 Aug 2018 13:11), Max: 37 (01 Aug 2018 05:33)  T(F): 98 (01 Aug 2018 13:11), Max: 98.6 (01 Aug 2018 05:33)  HR: 96 (01 Aug 2018 13:11) (74 - 103)  BP: 106/70 (01 Aug 2018 13:11) (102/66 - 144/79)  BP(mean): --  RR: 18 (01 Aug 2018 13:11) (14 - 18)  SpO2: 99% (01 Aug 2018 13:11) (96% - 99%)                          10.6   13.02 )-----------( 400      ( 01 Aug 2018 07:05 )             32.0     08-01    143  |  106  |  22  ----------------------------<  92  4.0   |  29  |  1.20    Ca    8.6      01 Aug 2018 07:05    TPro  7.8  /  Alb  2.9<L>  /  TBili  0.7  /  DBili  x   /  AST  19  /  ALT  14  /  AlkPhos  145<H>  07-31      Objective exam:   vasc: pedal pulses mildly palpable; CFT < 3 seconds x10; TG WNL; no edema noted  Derm:  -wound to lateral plantar left heel noted to measure approximately 5cm x 4cm x 0.1cm without probe to bone with fibro-granular base (80:20) and mild serous drainage noted to the dressing; mild malodor present, no periwound erythema or hyperkeratosis noted  -wound to plantar medial right heel noted to measure approximately 3cm x 4cm x 0.1cm without probe to bone with fibro-granular base (30:70) without drainage noted to the dressing, no malodor noted, no periwound erytrhema or hyperkeratosis noted  Neuro: deferred

## 2018-08-01 NOTE — PROGRESS NOTE ADULT - PROBLEM SELECTOR PLAN 7
HCP Ovidio, requesting palliative care and GOC discussion. To sign DNR/DNI. HCP Ovidio, requesting palliative care and GOC discussion.   -DNR/DNI signed. Pt. has MOLST form

## 2018-08-01 NOTE — PROGRESS NOTE ADULT - ASSESSMENT
Patient is an 85 year old female with a PMHx of parkinson's and neurocognitive disorder admitted for stage 3 pressure ulcers in feet bilaterally (heels). Patient is an 85 year old female with a PMHx of parkinson's and neurocognitive disorder admitted for stage 3 pressure ulcers in feet bilaterally (heels), POA.  r/o OM

## 2018-08-01 NOTE — PROGRESS NOTE ADULT - SUBJECTIVE AND OBJECTIVE BOX
Patient is a 85y old  Female who presents with a chief complaint of heel wounds (31 Jul 2018 14:16)   SOB    INTERVAL HPI/OVERNIGHT EVENTS:  Brother at bedside when evaluated. Patient mildly responsive with hx of dimentia. No events overnight.     MEDICATIONS  (STANDING):  ATENolol  Tablet 25 milliGRAM(s) Oral daily  carbidopa/levodopa  25/100 1 Tablet(s) Oral three times a day  collagenase Ointment 1 Application(s) Topical daily  levothyroxine 25 MICROGram(s) Oral daily  mirtazapine 7.5 milliGRAM(s) Oral daily  piperacillin/tazobactam IVPB. 3.375 Gram(s) IV Intermittent every 8 hours  rasagiline Tablet 0.5 milliGRAM(s) Oral daily  rivaroxaban 15 milliGRAM(s) Oral every 24 hours  simvastatin 10 milliGRAM(s) Oral at bedtime  vancomycin  IVPB 1000 milliGRAM(s) IV Intermittent every 12 hours    MEDICATIONS  (PRN):  acetaminophen   Tablet 650 milliGRAM(s) Oral every 6 hours PRN For Temp greater than 38 C (100.4 F)  acetaminophen   Tablet. 650 milliGRAM(s) Oral every 6 hours PRN Moderate Pain (4 - 6)      Allergies    No Known Allergies    Intolerances        REVIEW OF SYSTEMS:  Unable to assess due to patient's baseline mental status.     Vital Signs Last 24 Hrs  T(C): 36.7 (01 Aug 2018 13:11), Max: 37 (01 Aug 2018 05:33)  T(F): 98 (01 Aug 2018 13:11), Max: 98.6 (01 Aug 2018 05:33)  HR: 96 (01 Aug 2018 13:11) (74 - 103)  BP: 106/70 (01 Aug 2018 13:11) (102/66 - 144/79)  BP(mean): --  RR: 18 (01 Aug 2018 13:11) (14 - 18)  SpO2: 99% (01 Aug 2018 13:11) (96% - 99%)    PHYSICAL EXAM:  GENERAL: No acute distress, well-nourished, well-developed  HEAD:  Atraumatic, Normocephalic  NECK: Supple, No Jugular Venous Distension, Normal thyroid  CHEST/LUNG: Clear to auscultation bilaterally; No rales, rhonchi, wheezing, or rubs  HEART: Regular rate and rhythm; No murmurs, rubs, or gallops  ABDOMEN: Soft, Nontender, Nondistended; Bowel sounds present  EXTREMITIES:  2+ Peripheral Pulses, No clubbing, cyanosis, or edema  LYMPH: No lymphadenopathy noted  SKIN: No rashes or lesions    LABS:                        10.6   13.02 )-----------( 400      ( 01 Aug 2018 07:05 )             32.0     01 Aug 2018 07:05    143    |  106    |  22     ----------------------------<  92     4.0     |  29     |  1.20     Ca    8.6        01 Aug 2018 07:05        CAPILLARY BLOOD GLUCOSE        BLOOD CULTURE    RADIOLOGY & ADDITIONAL TESTS:    Imaging Personally Reviewed:  [ ] YES     Consultant(s) Notes Reviewed:      Care Discussed with Consultants/Other Providers: Patient is a 85y old  Female who presents with a chief complaint of heel wounds (31 Jul 2018 14:16)   SOB    INTERVAL HPI/OVERNIGHT EVENTS:  Brother at bedside when evaluated. Patient mildly responsive with hx of dimentia. No events overnight.     MEDICATIONS  (STANDING):  ATENolol  Tablet 25 milliGRAM(s) Oral daily  carbidopa/levodopa  25/100 1 Tablet(s) Oral three times a day  collagenase Ointment 1 Application(s) Topical daily  levothyroxine 25 MICROGram(s) Oral daily  mirtazapine 7.5 milliGRAM(s) Oral daily  piperacillin/tazobactam IVPB. 3.375 Gram(s) IV Intermittent every 8 hours  rasagiline Tablet 0.5 milliGRAM(s) Oral daily  rivaroxaban 15 milliGRAM(s) Oral every 24 hours  simvastatin 10 milliGRAM(s) Oral at bedtime  vancomycin  IVPB 1000 milliGRAM(s) IV Intermittent every 12 hours    MEDICATIONS  (PRN):  acetaminophen   Tablet 650 milliGRAM(s) Oral every 6 hours PRN For Temp greater than 38 C (100.4 F)  acetaminophen   Tablet. 650 milliGRAM(s) Oral every 6 hours PRN Moderate Pain (4 - 6)      Allergies    No Known Allergies    Intolerances        REVIEW OF SYSTEMS:  Unable to assess due to patient's baseline mental status.     Vital Signs Last 24 Hrs  T(C): 36.7 (01 Aug 2018 13:11), Max: 37 (01 Aug 2018 05:33)  T(F): 98 (01 Aug 2018 13:11), Max: 98.6 (01 Aug 2018 05:33)  HR: 96 (01 Aug 2018 13:11) (74 - 103)  BP: 106/70 (01 Aug 2018 13:11) (102/66 - 144/79)  BP(mean): --  RR: 18 (01 Aug 2018 13:11) (14 - 18)  SpO2: 99% (01 Aug 2018 13:11) (96% - 99%)    PHYSICAL EXAM:  GENERAL: No acute distress, well-nourished, well-developed  HEAD:  Atraumatic, Normocephalic  NECK: Supple, No Jugular Venous Distension, Normal thyroid  CHEST/LUNG: Clear to auscultation bilaterally; No rales, rhonchi, wheezing, or rubs  HEART: Regular rate and rhythm; No murmurs, rubs, or gallops  ABDOMEN: Soft, Nontender, Nondistended; Bowel sounds present  EXTREMITIES:  2+ Peripheral Pulses, + bilat heel dressing, + right hip eschar, no erythema   LYMPH: No lymphadenopathy noted    LABS:                        10.6   13.02 )-----------( 400      ( 01 Aug 2018 07:05 )             32.0     01 Aug 2018 07:05    143    |  106    |  22     ----------------------------<  92     4.0     |  29     |  1.20     Ca    8.6        01 Aug 2018 07:05        CAPILLARY BLOOD GLUCOSE        BLOOD CULTURE    RADIOLOGY & ADDITIONAL TESTS:    Imaging Personally Reviewed:  [ ] YES     Consultant(s) Notes Reviewed:      Care Discussed with Consultants/Other Providers:

## 2018-08-01 NOTE — PROGRESS NOTE ADULT - ATTENDING COMMENTS
Patient seen and evaluated  wounds cleansed with normal saline, dressed with santyl, DSD  Offloading boots applied, please continue at all times  Recommend IV antibiotics and ID consult  Follow up possible MRI    Wound care instructions  1. cleanse wound with normal saline, pat dry  2. apply nickel-thickness santyl/collagenase  3. dress with abd padding, gauze and jeffery  4. apply offloading pillows at all times

## 2018-08-01 NOTE — CHART NOTE - NSCHARTNOTEFT_GEN_A_CORE
Upon Nutritional Assessment by the Registered Dietitian your patient was determined to meet criteria / has evidence of the following diagnosis/diagnoses:          [ ]  Mild Protein Calorie Malnutrition        [ x]  Moderate Protein Calorie Malnutrition        [ ] Severe Protein Calorie Malnutrition        [ ] Unspecified Protein Calorie Malnutrition        [ ] Underweight / BMI <19        [ ] Morbid Obesity / BMI > 40      Findings as based on:  •  Comprehensive nutrition assessment and consultation  •  Calorie counts (nutrient intake analysis)  •  Food acceptance and intake status from observations by staff  •  Follow up  •  Patient education  •  Intervention secondary to interdisciplinary rounds  •   concerns  * 50% po intake  * temporal/orbital wasting    Treatment:    The following diet has been recommended: DASH/TLC; Ensure BID; Skinny BID; MVI daily; vit C 500 mg BID      PROVIDER Section:     By signing this assessment you are acknowledging and agree with the diagnosis/diagnoses assigned by the Registered Dietitian    Comments:

## 2018-08-01 NOTE — PROGRESS NOTE ADULT - PROBLEM SELECTOR PLAN 4
chronic, stable  -continue home medications -Chronic and stable  -Continue home medication of simvastatin 10 mg PO QHS

## 2018-08-01 NOTE — PROGRESS NOTE ADULT - ATTENDING COMMENTS
MRI of the feet  Continue IV antibiotics   ID Consult.  Podiatry/ Wound care consults  Prognosis is guarded.  D/w daughter Erin at bedside, plan discussed at length, she was concerned that wounds are getting worse, I explained that she just got here and these wounds were POA, and she will need MRI to r/o OM and ID, wound care, podiatry will be seeing her. Nutritional support. I gave her my number in case any question or concern. Erin [phone] Right Hip Eschar? looks like heeled pressure ulcer with underlying lumpiness ?? CT right HIP to r/o hematoma or mass   MRI of the feet  Continue IV antibiotics   ID Consult.  Podiatry/ Wound care consults  Prognosis is guarded.  D/w daughter Erin at bedside, plan discussed at length, she was concerned that wounds are getting worse, I explained that she just got here and these wounds were POA, and she will need MRI to r/o OM and ID, wound care, podiatry will be seeing her. Nutritional support. I gave her my number in case any question or concern. Erin [phone]

## 2018-08-01 NOTE — DIETITIAN INITIAL EVALUATION ADULT. - OTHER INFO
84 y/o female adm with pressure ulcers of foot. Pt noted to be confused. Pt being fed meals, 50% consumed.

## 2018-08-02 ENCOUNTER — TRANSCRIPTION ENCOUNTER (OUTPATIENT)
Age: 83
End: 2018-08-02

## 2018-08-02 LAB
ALBUMIN SERPL ELPH-MCNC: 2.5 G/DL — LOW (ref 3.3–5)
ALP SERPL-CCNC: 118 U/L — SIGNIFICANT CHANGE UP (ref 40–120)
ALT FLD-CCNC: <6 U/L — LOW (ref 12–78)
ANION GAP SERPL CALC-SCNC: 9 MMOL/L — SIGNIFICANT CHANGE UP (ref 5–17)
AST SERPL-CCNC: 15 U/L — SIGNIFICANT CHANGE UP (ref 15–37)
BILIRUB SERPL-MCNC: 0.9 MG/DL — SIGNIFICANT CHANGE UP (ref 0.2–1.2)
BUN SERPL-MCNC: 20 MG/DL — SIGNIFICANT CHANGE UP (ref 7–23)
CALCIUM SERPL-MCNC: 8.3 MG/DL — LOW (ref 8.5–10.1)
CHLORIDE SERPL-SCNC: 103 MMOL/L — SIGNIFICANT CHANGE UP (ref 96–108)
CO2 SERPL-SCNC: 29 MMOL/L — SIGNIFICANT CHANGE UP (ref 22–31)
CREAT SERPL-MCNC: 1.1 MG/DL — SIGNIFICANT CHANGE UP (ref 0.5–1.3)
GLUCOSE SERPL-MCNC: 109 MG/DL — HIGH (ref 70–99)
HCT VFR BLD CALC: 30.7 % — LOW (ref 34.5–45)
HGB BLD-MCNC: 10 G/DL — LOW (ref 11.5–15.5)
MCHC RBC-ENTMCNC: 29.3 PG — SIGNIFICANT CHANGE UP (ref 27–34)
MCHC RBC-ENTMCNC: 32.6 GM/DL — SIGNIFICANT CHANGE UP (ref 32–36)
MCV RBC AUTO: 90 FL — SIGNIFICANT CHANGE UP (ref 80–100)
NRBC # BLD: 0 /100 WBCS — SIGNIFICANT CHANGE UP (ref 0–0)
PLATELET # BLD AUTO: 371 K/UL — SIGNIFICANT CHANGE UP (ref 150–400)
POTASSIUM SERPL-MCNC: 3.6 MMOL/L — SIGNIFICANT CHANGE UP (ref 3.5–5.3)
POTASSIUM SERPL-SCNC: 3.6 MMOL/L — SIGNIFICANT CHANGE UP (ref 3.5–5.3)
PROT SERPL-MCNC: 6.9 G/DL — SIGNIFICANT CHANGE UP (ref 6–8.3)
RBC # BLD: 3.41 M/UL — LOW (ref 3.8–5.2)
RBC # FLD: 14.9 % — HIGH (ref 10.3–14.5)
SODIUM SERPL-SCNC: 141 MMOL/L — SIGNIFICANT CHANGE UP (ref 135–145)
WBC # BLD: 10.07 K/UL — SIGNIFICANT CHANGE UP (ref 3.8–10.5)
WBC # FLD AUTO: 10.07 K/UL — SIGNIFICANT CHANGE UP (ref 3.8–10.5)

## 2018-08-02 PROCEDURE — 73720 MRI LWR EXTREMITY W/O&W/DYE: CPT | Mod: 26,LT

## 2018-08-02 PROCEDURE — 99233 SBSQ HOSP IP/OBS HIGH 50: CPT

## 2018-08-02 RX ADMIN — CARBIDOPA AND LEVODOPA 1 TABLET(S): 25; 100 TABLET ORAL at 13:17

## 2018-08-02 RX ADMIN — PIPERACILLIN AND TAZOBACTAM 25 GRAM(S): 4; .5 INJECTION, POWDER, LYOPHILIZED, FOR SOLUTION INTRAVENOUS at 01:53

## 2018-08-02 RX ADMIN — SIMVASTATIN 10 MILLIGRAM(S): 20 TABLET, FILM COATED ORAL at 21:59

## 2018-08-02 RX ADMIN — PIPERACILLIN AND TAZOBACTAM 25 GRAM(S): 4; .5 INJECTION, POWDER, LYOPHILIZED, FOR SOLUTION INTRAVENOUS at 21:59

## 2018-08-02 RX ADMIN — PIPERACILLIN AND TAZOBACTAM 25 GRAM(S): 4; .5 INJECTION, POWDER, LYOPHILIZED, FOR SOLUTION INTRAVENOUS at 13:17

## 2018-08-02 RX ADMIN — Medication 25 MICROGRAM(S): at 05:37

## 2018-08-02 RX ADMIN — RASAGILINE 0.5 MILLIGRAM(S): 0.5 TABLET ORAL at 12:50

## 2018-08-02 RX ADMIN — CARBIDOPA AND LEVODOPA 1 TABLET(S): 25; 100 TABLET ORAL at 21:59

## 2018-08-02 RX ADMIN — CARBIDOPA AND LEVODOPA 1 TABLET(S): 25; 100 TABLET ORAL at 05:37

## 2018-08-02 RX ADMIN — Medication 1 APPLICATION(S): at 12:27

## 2018-08-02 RX ADMIN — Medication 250 MILLIGRAM(S): at 05:36

## 2018-08-02 RX ADMIN — MIRTAZAPINE 7.5 MILLIGRAM(S): 45 TABLET, ORALLY DISINTEGRATING ORAL at 12:50

## 2018-08-02 RX ADMIN — RIVAROXABAN 15 MILLIGRAM(S): KIT at 17:56

## 2018-08-02 RX ADMIN — Medication 250 MILLIGRAM(S): at 17:56

## 2018-08-02 NOTE — PROGRESS NOTE ADULT - SUBJECTIVE AND OBJECTIVE BOX
Patient is a 85y old  Female who presents with a chief complaint of heel wounds (31 Jul 2018 14:16)    INTERVAL HPI/OVERNIGHT EVENTS:  No acute events over night. Son at bedside while patient was sleeping.     MEDICATIONS  (STANDING):  ATENolol  Tablet 25 milliGRAM(s) Oral daily  carbidopa/levodopa  25/100 1 Tablet(s) Oral three times a day  collagenase Ointment 1 Application(s) Topical daily  levothyroxine 25 MICROGram(s) Oral daily  mirtazapine 7.5 milliGRAM(s) Oral daily  piperacillin/tazobactam IVPB. 3.375 Gram(s) IV Intermittent every 8 hours  rasagiline Tablet 0.5 milliGRAM(s) Oral daily  rivaroxaban 15 milliGRAM(s) Oral every 24 hours  simvastatin 10 milliGRAM(s) Oral at bedtime  vancomycin  IVPB 1000 milliGRAM(s) IV Intermittent every 12 hours    MEDICATIONS  (PRN):  acetaminophen   Tablet 650 milliGRAM(s) Oral every 6 hours PRN For Temp greater than 38 C (100.4 F)  acetaminophen   Tablet. 650 milliGRAM(s) Oral every 6 hours PRN Moderate Pain (4 - 6)      Allergies    No Known Allergies    Intolerances        REVIEW OF SYSTEMS:  Unable to assess based on patients mental status.    Vital Signs Last 24 Hrs  T(C): 36.6 (02 Aug 2018 04:58), Max: 36.7 (01 Aug 2018 13:11)  T(F): 97.8 (02 Aug 2018 04:58), Max: 98.1 (01 Aug 2018 21:12)  HR: 69 (02 Aug 2018 04:58) (69 - 102)  BP: 102/56 (02 Aug 2018 04:58) (102/56 - 110/59)  RR: 17 (02 Aug 2018 04:58) (17 - 18)  SpO2: 96% (02 Aug 2018 04:58) (96% - 99%)    PHYSICAL EXAM:  GENERAL: No acute distress, well-nourished, well-developed  HEAD:  Atraumatic, Normocephalic  NECK: Supple, No Jugular Venous Distension, Normal thyroid  CHEST/LUNG: Clear to auscultation bilaterally; No rales, rhonchi, wheezing, or rubs  HEART: Regular rate and rhythm; No murmurs, rubs, or gallops  ABDOMEN: Soft, Nontender, Nondistended; Bowel sounds present  EXTREMITIES:  2+ Peripheral Pulses, No clubbing, cyanosis, or edema  LYMPH: No lymphadenopathy noted  SKIN: No rashes or lesions    LABS:                        10.0   10.07 )-----------( 371      ( 02 Aug 2018 07:15 )             30.7     02 Aug 2018 07:15    141    |  103    |  20     ----------------------------<  109    3.6     |  29     |  1.10     Ca    8.3        02 Aug 2018 07:15    TPro  6.9    /  Alb  2.5    /  TBili  0.9    /  DBili  x      /  AST  15     /  ALT  <6     /  AlkPhos  118    02 Aug 2018 07:15      CAPILLARY BLOOD GLUCOSE        BLOOD CULTURE    RADIOLOGY & ADDITIONAL TESTS:    Imaging Personally Reviewed:  [ ] YES     Consultant(s) Notes Reviewed:      Care Discussed with Consultants/Other Providers:

## 2018-08-02 NOTE — PROGRESS NOTE ADULT - ATTENDING COMMENTS
Patient seen and evaluated with the resident  Wounds cleansed and dressed with santyl, DSD  MRI results for right foot : soft tissue edema noted, no bony abnormalities  Follow up official MRI results of left foot  Offloading boots applied, please continue at all times  Recommend continuing IV antibiotics   Appreciate ID recommendations    Wound care instructions  1. cleanse wound with normal saline, pat dry  2. apply nickel-thickness santyl/collagenase  3. dress with abd padding, gauze and jeffery  4. apply offloading pillows at all times. Patient seen and evaluated with the resident  Wounds cleansed and dressed with santyl, DSD  MRI results for right foot: soft tissue edema noted, no bony abnormalities  Follow up official MRI results of left foot  Offloading boots applied, please continue at all times  Recommend continuing IV antibiotics   Appreciate ID recommendations    Planning for bedside debridement of the left heel on Friday 8/4/18, consent signed and placed in chart, spoke with the son on the phone who will come in and sign this evening.    Wound care instructions  1. cleanse wound with normal saline, pat dry  2. apply nickel-thickness santyl/collagenase  3. dress with abd padding, gauze and jeffery  4. apply offloading pillows at all times

## 2018-08-02 NOTE — PROGRESS NOTE ADULT - ASSESSMENT
Pressure ulcers both heels, stage 3  Role of antibiotics unclear based on notes in chart    Hip mass- ?old hematoma.

## 2018-08-02 NOTE — PROGRESS NOTE ADULT - PROBLEM SELECTOR PLAN 1
-ID is following  -MRI of R foot showed no osteomyelitis, awaiting results of MRI for L heel  Continue IV Vancomycin 1000 mg Q12  -Continue to follow Vanc trough (16.4 8/1/18)  -Continue 3.375 Zosyn mg IV Q8H   -If no osteo, patient may be discharged on oral rocephin.   -Continue collagenese ointment in bilateral heels  -Continue off-loading of heels bilaterally  -Dr. Álvarez, wound care recommendations appreciated. Continue Collagenase, protective dressing -MRI of R foot showed no osteomyelitis, but left is + for ? acute osteomyelitis  -D/w ID recommended bone biopsy, d/w Podiatry Dr. Vargas, will perform bone biopsy at bedside.   -Continue IV Vancomycin 1000 mg Q12  -Continue to follow Vanc trough (16.4 8/1/18), monitor renal function   -Continue 3.375 Zosyn mg IV Q8H   -Continue collagenese ointment in bilateral heels  -Continue off-loading of heels bilaterally  -Dr. Álvarez, wound care recommendations appreciated. Continue Collagenase, protective dressing

## 2018-08-02 NOTE — PROGRESS NOTE ADULT - SUBJECTIVE AND OBJECTIVE BOX
86yo female seen bedside during AM rounds for her bilateral decubitus heel wounds. Patient resting in bed and unable to communicate at this time but responds to tactile stimuli.    Vital Signs Last 24 Hrs  T(C): 36.6 (02 Aug 2018 04:58), Max: 36.7 (01 Aug 2018 13:11)  T(F): 97.8 (02 Aug 2018 04:58), Max: 98.1 (01 Aug 2018 21:12)  HR: 69 (02 Aug 2018 04:58) (69 - 102)  BP: 102/56 (02 Aug 2018 04:58) (102/56 - 110/59)  BP(mean): --  RR: 17 (02 Aug 2018 04:58) (17 - 18)  SpO2: 96% (02 Aug 2018 04:58) (96% - 99%)                          10.0   10.07 )-----------( 371      ( 02 Aug 2018 07:15 )             30.7       08-02    141  |  103  |  20  ----------------------------<  109<H>  3.6   |  29  |  1.10    Ca    8.3<L>      02 Aug 2018 07:15    TPro  6.9  /  Alb  2.5<L>  /  TBili  0.9  /  DBili  x   /  AST  15  /  ALT  <6<L>  /  AlkPhos  118  08-02      Objective exam:   vasc: pedal pulses mildly palpable; CFT < 3 seconds x10; TG WNL; no edema noted  Derm:  -wound to lateral plantar left heel noted to measure approximately 5cm x 4cm x 0.1cm without probe to bone with fibro-granular base (80:20) and mild serous drainage noted to the dressing; mild malodor present, no periwound erythema or hyperkeratosis noted  -wound to plantar medial right heel noted to measure approximately 3cm x 4cm x 0.1cm without probe to bone with fibro-granular base (30:70) without drainage noted to the dressing, no malodor noted, no periwound erythema or hyperkeratosis noted  Neuro: deferred

## 2018-08-02 NOTE — PROGRESS NOTE ADULT - PROBLEM SELECTOR PLAN 2
-Chronic disease, presently stable. Patient bedbound  -continue home med Carbadopa/Levadopa 25/100 mg PO TID  -Continue home med rasagiline (Azilect) 0.5 mg PO daily

## 2018-08-02 NOTE — PROGRESS NOTE ADULT - SUBJECTIVE AND OBJECTIVE BOX
[facility], Division of Infectious Diseases  CHIDI Nava A. Lee  [phone]  Name: MIGUEL LORD  Age: 85y  Gender: Female  MRN: [id number]    Interval History--  Notes reviewed  arousable but lethargic    Past Medical History--  Stroke  Hypothyroidism  Hyperlipidemia  Parkinson disease  Afib  Hypertension  Dementia  H/O total knee replacement, right      For details regarding the patient's social history, family history, and other miscellaneous elements, please refer the initial infectious diseases consultation and/or the admitting history and physical examination for this admission.    Allergies    No Known Allergies    Intolerances        Medications--  Antibiotics:  piperacillin/tazobactam IVPB. 3.375 Gram(s) IV Intermittent every 8 hours  vancomycin  IVPB 1000 milliGRAM(s) IV Intermittent every 12 hours    Immunologic:    Other:  acetaminophen   Tablet PRN  acetaminophen   Tablet. PRN  ATENolol  Tablet  carbidopa/levodopa  25/100  collagenase Ointment  levothyroxine  mirtazapine  rasagiline Tablet  rivaroxaban  simvastatin      Review of Systems--  A 10-point review of systems was obtained.     unable to obtain  Review of systems otherwise negative except as previously noted.    Physical Examination--  Vital Signs: T(F): 97.8 (08-02-18 @ 04:58), Max: 98.1 (08-01-18 @ 21:12)  HR: 69 (08-02-18 @ 04:58)  BP: 102/56 (08-02-18 @ 04:58)  RR: 17 (08-02-18 @ 04:58)  SpO2: 96% (08-02-18 @ 04:58)  Wt(kg): --  General: Nontoxic-appearing Female in no acute distress.  HEENT: AT/NC.Anicteric. Conjunctiva pink and moist.   Neck: Not rigid. No sense of mass.  Nodes: None palpable.  Lungs: noncompliant with exam  Heart: Regular rate and rhythm. No Murmur. No rub. No gallop. No palpable thrill.  Abdomen: Bowel sounds present and normoactive. Soft. Nondistended.   Extremities: No cyanosis or clubbing. No edema. b/l ulcers wrapped  Skin: Warm. Dry. Good turgor. No rash. No vasculitic stigmata.          Laboratory Studies--  CBC                        10.0   10.07 )-----------( 371      ( 02 Aug 2018 07:15 )             30.7       Chemistries  08-02    141  |  103  |  20  ----------------------------<  109<H>  3.6   |  29  |  1.10    Ca    8.3<L>      02 Aug 2018 07:15    TPro  6.9  /  Alb  2.5<L>  /  TBili  0.9  /  DBili  x   /  AST  15  /  ALT  <6<L>  /  AlkPhos  118  08-02      Culture Data      < from: MR Foot w/wo IV Cont, Left (08.02.18 @ 08:12) >  EXAM:  MR FOOT WAW IC LT                            PROCEDURE DATE:  08/02/2018          INTERPRETATION:  History: Stage III heel ulcer. Concern for underlying   osteomyelitis.    Multiplanar multisequence MRI of the left foot localized to the ankle was   performed with and without intravenous contrast.    7.5 cc of Gadavist was administered intravenously. 0 cc was discarded.    No prior studies available for comparison.    Findings:    There is a broad cutaneous defect along the posterior aspect of the   calcaneus laterally with surrounding soft tissue edema, enhancement, and   skin thickening consistent with cellulitis. Wound extends to the   posterior lateral cortical surface of the calcaneus where there is   associated prominent subcortical osseous edema noted throughout the   posterior aspect of the calcaneus, asymmetric to the lateral aspect,   which extends towards the posterior third of the calcaneal body. This   edema extends to the Achilles tendon insertion and the plantar fascia   insertion. There is minimal decreased T1 signal in the subcortical   region. Findings are consistent with early acute osteomyelitis given the   presence of an adjacent wound. The marrow signal within the remainder of   the foot is otherwise intact. No drainable fluid collection is   demonstrated.    There is mild insertional tendinosis of the Achilles tendon with   retrocalcaneal bursitis. The remaining tendinous structures are otherwise   intact. There is no evidence of acute ligamentous injury. Visualized   joint spaces are preserved.    There is mild fatty infiltration edema within the plantar muscles. There   is a small plantar calcaneal spur.    Impression:    Broad cutaneous ulceration along the posterior aspect of the calcaneus   which extends to the cortical surface. There is prominent edema and   enhancement within the posterior aspect of the calcaneus extending to the   posterior third of the calcaneal body. Mildly decreased T1 marrow signal   is noted about the posterior subcortical margin. These findings are most   suggestive of early acute osteomyelitis. Surrounding cellulitis is noted.   No drainable fluid collection.    Mild retrocalcaneal bursitis and insertional tendinosis of the Achilles   tendon.                < end of copied text >        < from: MR Foot w/wo IV Cont, Right (08.01.18 @ 18:29) >  XAM:  MR FOOT WAW IC RT                            PROCEDURE DATE:  08/01/2018          INTERPRETATION:  History: Stage III left heel ulcer. Possible infection.    Multiplanar multisequence MRI of the right foot was performed from the   level of the metatarsal bases to level of the ankle with and without   intravenous contrast.    7.5 cc of Gadavist was administered intravenously. 0 cc was discarded.    Findings:    There is subcutaneous edema and enhancement noted along the plantar   posterior aspect of the calcaneus consistent with cellulitis. There is no   peripherally enhancing fluid collection to suggest abscess. Signal   arising from bone is within normal limits without evidence of   osteomyelitis, acute fracture, or osteonecrosis. There is mild arthrosis   along the posterior aspect of the posterior facet subtalar joint with   mild subchondral cystic change. The remaining joint spaces are otherwise   intact.    There is a chronic split tear of the peroneus brevis tendon with distal   reconstitution along the lateral wall of the calcaneus and normal   insertion on the base of the fifth metatarsal. There is mild tendinosis   of the Achilles tendon. The remaining tendinous structures otherwise   intact. There is chronic scar remodeling of the deltoid ligament. There   is no evidence of acute ligamentous injury.    There is mild edema within the distal myotendinous junction and flexor   hallucis longus muscle suggestive of mild strain.    There is chronic thickening at the origin of the plantar fascia with a   moderate plantar calcaneal spur.    Impression:    Prominent cellulitis about the posterior plantar aspect of the calcaneus   without evidence of abscess. No evidence of osteomyelitis.          < end of copied text >

## 2018-08-02 NOTE — PROGRESS NOTE ADULT - PROBLEM SELECTOR PLAN 5
chronic, stable  -Rate controlled on Atenolol 25 mg PO daily  -Anticoagulated with 15 mg Xarelto Q24H

## 2018-08-03 LAB
ANION GAP SERPL CALC-SCNC: 9 MMOL/L — SIGNIFICANT CHANGE UP (ref 5–17)
BUN SERPL-MCNC: 15 MG/DL — SIGNIFICANT CHANGE UP (ref 7–23)
CALCIUM SERPL-MCNC: 8.3 MG/DL — LOW (ref 8.5–10.1)
CHLORIDE SERPL-SCNC: 104 MMOL/L — SIGNIFICANT CHANGE UP (ref 96–108)
CO2 SERPL-SCNC: 27 MMOL/L — SIGNIFICANT CHANGE UP (ref 22–31)
CREAT SERPL-MCNC: 1 MG/DL — SIGNIFICANT CHANGE UP (ref 0.5–1.3)
GLUCOSE SERPL-MCNC: 117 MG/DL — HIGH (ref 70–99)
GRAM STN FLD: SIGNIFICANT CHANGE UP
GRAM STN FLD: SIGNIFICANT CHANGE UP
HCT VFR BLD CALC: 30 % — LOW (ref 34.5–45)
HGB BLD-MCNC: 9.9 G/DL — LOW (ref 11.5–15.5)
MCHC RBC-ENTMCNC: 29.2 PG — SIGNIFICANT CHANGE UP (ref 27–34)
MCHC RBC-ENTMCNC: 33 GM/DL — SIGNIFICANT CHANGE UP (ref 32–36)
MCV RBC AUTO: 88.5 FL — SIGNIFICANT CHANGE UP (ref 80–100)
NRBC # BLD: 0 /100 WBCS — SIGNIFICANT CHANGE UP (ref 0–0)
PLATELET # BLD AUTO: 366 K/UL — SIGNIFICANT CHANGE UP (ref 150–400)
POTASSIUM SERPL-MCNC: 3.8 MMOL/L — SIGNIFICANT CHANGE UP (ref 3.5–5.3)
POTASSIUM SERPL-SCNC: 3.8 MMOL/L — SIGNIFICANT CHANGE UP (ref 3.5–5.3)
RBC # BLD: 3.39 M/UL — LOW (ref 3.8–5.2)
RBC # FLD: 14.8 % — HIGH (ref 10.3–14.5)
SODIUM SERPL-SCNC: 140 MMOL/L — SIGNIFICANT CHANGE UP (ref 135–145)
SPECIMEN SOURCE: SIGNIFICANT CHANGE UP
SPECIMEN SOURCE: SIGNIFICANT CHANGE UP
WBC # BLD: 9.96 K/UL — SIGNIFICANT CHANGE UP (ref 3.8–10.5)
WBC # FLD AUTO: 9.96 K/UL — SIGNIFICANT CHANGE UP (ref 3.8–10.5)

## 2018-08-03 PROCEDURE — 88304 TISSUE EXAM BY PATHOLOGIST: CPT | Mod: 26

## 2018-08-03 PROCEDURE — 99233 SBSQ HOSP IP/OBS HIGH 50: CPT

## 2018-08-03 PROCEDURE — 88311 DECALCIFY TISSUE: CPT | Mod: 26

## 2018-08-03 RX ADMIN — PIPERACILLIN AND TAZOBACTAM 25 GRAM(S): 4; .5 INJECTION, POWDER, LYOPHILIZED, FOR SOLUTION INTRAVENOUS at 13:29

## 2018-08-03 RX ADMIN — MIRTAZAPINE 7.5 MILLIGRAM(S): 45 TABLET, ORALLY DISINTEGRATING ORAL at 10:58

## 2018-08-03 RX ADMIN — Medication 250 MILLIGRAM(S): at 17:35

## 2018-08-03 RX ADMIN — SIMVASTATIN 10 MILLIGRAM(S): 20 TABLET, FILM COATED ORAL at 22:19

## 2018-08-03 RX ADMIN — CARBIDOPA AND LEVODOPA 1 TABLET(S): 25; 100 TABLET ORAL at 05:33

## 2018-08-03 RX ADMIN — CARBIDOPA AND LEVODOPA 1 TABLET(S): 25; 100 TABLET ORAL at 13:29

## 2018-08-03 RX ADMIN — RIVAROXABAN 15 MILLIGRAM(S): KIT at 17:35

## 2018-08-03 RX ADMIN — Medication 25 MICROGRAM(S): at 05:34

## 2018-08-03 RX ADMIN — CARBIDOPA AND LEVODOPA 1 TABLET(S): 25; 100 TABLET ORAL at 22:19

## 2018-08-03 RX ADMIN — Medication 1 APPLICATION(S): at 11:00

## 2018-08-03 RX ADMIN — PIPERACILLIN AND TAZOBACTAM 25 GRAM(S): 4; .5 INJECTION, POWDER, LYOPHILIZED, FOR SOLUTION INTRAVENOUS at 22:19

## 2018-08-03 RX ADMIN — RASAGILINE 0.5 MILLIGRAM(S): 0.5 TABLET ORAL at 10:59

## 2018-08-03 RX ADMIN — Medication 250 MILLIGRAM(S): at 05:33

## 2018-08-03 RX ADMIN — PIPERACILLIN AND TAZOBACTAM 25 GRAM(S): 4; .5 INJECTION, POWDER, LYOPHILIZED, FOR SOLUTION INTRAVENOUS at 05:33

## 2018-08-03 RX ADMIN — ATENOLOL 25 MILLIGRAM(S): 25 TABLET ORAL at 05:33

## 2018-08-03 NOTE — PROGRESS NOTE ADULT - ATTENDING COMMENTS
Patient seen and evaluated with the resident  Performed bedside left heel debridement of wound after injecting heel with 10cc of marcaine plain 2%.   Took bone and soft tissue cultures and sent out bone for pathology of the calcaneus, to follow up results.  Cleansed and dressed with betadine, DSD  Right heel wounds cleansed and dressed with santyl, DSD  Offloading boots applied, please continue at all times  Appreciate ID recommendations     Wound care instructions:  1. cleanse wounds with normal saline  2. apply santyl to right heel wound and dress with materials such as 4x4 gauze, abd padding and jeffery  3. apply betadine to left heel wound and dress with materials such as 4x4 gauze, abd padding and jeffery  4. change daily  please have patient follow up in Dr. Vargas's wound care clinic within 1 week of discharge

## 2018-08-03 NOTE — PROGRESS NOTE ADULT - SUBJECTIVE AND OBJECTIVE BOX
[facility], Division of Infectious Diseases  CHIDI Nava A. Lee  [phone]    Name: MIGUEL LORD  Age: 85y  Gender: Female  MRN: [id number]    Interval History--  Notes reviewed. anderson Carias.    Past Medical History--  Stroke  Hypothyroidism  Hyperlipidemia  Parkinson disease  Afib  Hypertension  Dementia  H/O total knee replacement, right      For details regarding the patient's social history, family history, and other miscellaneous elements, please refer the initial infectious diseases consultation and/or the admitting history and physical examination for this admission.    Allergies    No Known Allergies    Intolerances        Medications--  Antibiotics:  piperacillin/tazobactam IVPB. 3.375 Gram(s) IV Intermittent every 8 hours  vancomycin  IVPB 1000 milliGRAM(s) IV Intermittent every 12 hours    Immunologic:    Other:  acetaminophen   Tablet PRN  acetaminophen   Tablet. PRN  ATENolol  Tablet  carbidopa/levodopa  25/100  collagenase Ointment  levothyroxine  mirtazapine  rasagiline Tablet  rivaroxaban  simvastatin      Review of Systems--  Review of systems unable secondary to clinical condition.      Physical Examination--  Vital Signs: T(F): 98.9 (08-03-18 @ 05:11), Max: 98.9 (08-03-18 @ 05:11)  HR: 70 (08-03-18 @ 05:11)  BP: 142/76 (08-03-18 @ 05:11)  RR: 17 (08-03-18 @ 05:11)  SpO2: 99% (08-03-18 @ 05:11)  Wt(kg): --  General: Nontoxic-appearing Female in no acute distress.   HEENT: AT/NC. Pupils/EOM unable secondary to patient compliance. Oropharynx/dentition unable secondary to patient compliance.   Neck: Increased tone, not frankly rigid  Nodes: None palpable.  Lungs: Poor effort, no rales, wheezing or rhonchi  Heart: Regular rate and rhythm, poor compliance with exam.  Abdomen: Bowel sounds present and normoactive. Soft. Nondistended. Nontender. No sense of mass. No organomegaly.  Extremities: No cyanosis or clubbing. Trace LE edema. Feet dresses.  Skin: Warm. Dry. Good turgor. No rash. No vasculitic stigmata.  Psychiatric: Unable        Laboratory Studies--  CBC                        9.9    9.96  )-----------( 366      ( 03 Aug 2018 07:30 )             30.0       Chemistries  08-03    140  |  104  |  15  ----------------------------<  117<H>  3.8   |  27  |  1.00    Ca    8.3<L>      03 Aug 2018 07:30    TPro  6.9  /  Alb  2.5<L>  /  TBili  0.9  /  DBili  x   /  AST  15  /  ALT  <6<L>  /  AlkPhos  118  08-02    Vancomycin Level, Trough -Pre 4th Dose, order if dosed q6/8/12h (08.01.18 @ 19:52)    Vancomycin Level, Trough: 16.4: Vancomycin trough levels should be rapidly reached and maintained at  15-20 ug/ml for life threatening MRSA  infections such as sepsis, endocarditis, osteomyelitis and pneumonia. A  first trough level should be drawn  before the 3rd or 4th dose.  Risk of renal toxicity is increased for levels >15 ug/ml, in patients on  other nephrotoxic drugs, who are  hemodynamically unstable, have unstable renal function, or are on  Vancomycin therapy for >14 days. Renal function with  creatinine levels should be monitored for those patients. ug/mL        Culture Data  None

## 2018-08-03 NOTE — PROGRESS NOTE ADULT - PROBLEM SELECTOR PLAN 1
-MRI of R foot showed no osteomyelitis, but MRI of left foot has questionable osteomyelitis  -ID recommended bone biopsy  -Podiatry Dr. Vargas, will perform bone biopsy at bedside today  -Continue IV Vancomycin 1000 mg Q12  -Continue to follow Vanc trough (16.4 8/1/18), monitor renal function   -Continue 3.375 Zosyn mg IV Q8H   -Continue collagenese ointment in bilateral heels  -Continue off-loading of heels bilaterally  -Dr. Álvarez, wound care recommendations appreciated. Continue Collagenase, protective dressing

## 2018-08-03 NOTE — PROGRESS NOTE ADULT - SUBJECTIVE AND OBJECTIVE BOX
Patient is a 85y old  Female who presents with a chief complaint of heel wounds (31 Jul 2018 14:16)      INTERVAL HPI/OVERNIGHT EVENTS:  No acute events overnight. Patient examined at bedside while sleeping. No family members present.     MEDICATIONS  (STANDING):  ATENolol  Tablet 25 milliGRAM(s) Oral daily  carbidopa/levodopa  25/100 1 Tablet(s) Oral three times a day  collagenase Ointment 1 Application(s) Topical daily  levothyroxine 25 MICROGram(s) Oral daily  mirtazapine 7.5 milliGRAM(s) Oral daily  piperacillin/tazobactam IVPB. 3.375 Gram(s) IV Intermittent every 8 hours  rasagiline Tablet 0.5 milliGRAM(s) Oral daily  rivaroxaban 15 milliGRAM(s) Oral every 24 hours  simvastatin 10 milliGRAM(s) Oral at bedtime  vancomycin  IVPB 1000 milliGRAM(s) IV Intermittent every 12 hours    MEDICATIONS  (PRN):  acetaminophen   Tablet 650 milliGRAM(s) Oral every 6 hours PRN For Temp greater than 38 C (100.4 F)  acetaminophen   Tablet. 650 milliGRAM(s) Oral every 6 hours PRN Moderate Pain (4 - 6)      Allergies    No Known Allergies    Intolerances        REVIEW OF SYSTEMS:  Unable to assess due to patient's baseline mental status.     Vital Signs Last 24 Hrs  T(C): 37.2 (03 Aug 2018 05:11), Max: 37.2 (03 Aug 2018 05:11)  T(F): 98.9 (03 Aug 2018 05:11), Max: 98.9 (03 Aug 2018 05:11)  HR: 70 (03 Aug 2018 05:11) (69 - 73)  BP: 142/76 (03 Aug 2018 05:11) (110/56 - 142/76)  RR: 17 (03 Aug 2018 05:11) (17 - 19)  SpO2: 99% (03 Aug 2018 05:11) (97% - 99%)    PHYSICAL EXAM:  GENERAL: No acute distress, well-groomed, well-developed  HEAD:  Atraumatic, Normocephalic  NECK: Supple, No Jugular Venous Distension, Normal thyroid  CHEST/LUNG: Clear to auscultation bilaterally; No rales, rhonchi, wheezing, or rubs  HEART: Regular rate and rhythm; No murmurs, rubs, or gallops  ABDOMEN: Soft, Nontender, Nondistended; Bowel sounds present  EXTREMITIES: Bilateral wrapped bandages on feet. No clubing, cyanosis or edema on visible extremities.   LYMPH: No lymphadenopathy noted  SKIN: No rashes or lesions    LABS:                        9.9    9.96  )-----------( 366      ( 03 Aug 2018 07:30 )             30.0     03 Aug 2018 07:30    140    |  104    |  15     ----------------------------<  117    3.8     |  27     |  1.00     Ca    8.3        03 Aug 2018 07:30        CAPILLARY BLOOD GLUCOSE        BLOOD CULTURE    RADIOLOGY & ADDITIONAL TESTS:    Imaging Personally Reviewed:  [ ] YES     Consultant(s) Notes Reviewed:      Care Discussed with Consultants/Other Providers:

## 2018-08-03 NOTE — PROGRESS NOTE ADULT - ATTENDING COMMENTS
Patient is an 85 year old female multiple medical problems, admitted for cellulitis of both heels with stage 3 pressure ulcers and cellulitis, suspect left heel acute Osteomyelitis  s/p biopsy today by podiatry at bedside. F/U Pathology  D/w daughter Erin

## 2018-08-03 NOTE — PROGRESS NOTE ADULT - ATTENDING COMMENTS
I'm available for issues over the weekend, please call if ID input needed.      Aryan Cheney MD  [phone]

## 2018-08-03 NOTE — PROGRESS NOTE ADULT - ASSESSMENT
Pressure ulcers both heels, stage 3  Osteomyelitis L heel  For bone Bx monday  Hip mass- ?old hematoma.  Vanco level ok

## 2018-08-03 NOTE — PROGRESS NOTE ADULT - ASSESSMENT
Patient is an 85 year old female admitted for cellulitis of both heels with stage 3 pressure ulcers and cellulitis, suspect left heel acute Osteomyelitis Patient is an 85 year old female multiple medical problems, admitted for cellulitis of both heels with stage 3 pressure ulcers and cellulitis, suspect left heel acute Osteomyelitis  s/p biopsy today by podiatry at bedside.

## 2018-08-03 NOTE — PROGRESS NOTE ADULT - SUBJECTIVE AND OBJECTIVE BOX
86yo female seen bedside during AM rounds with resident for her bilateral heel decubitus wounds. Patient's son signed consent last night for left heel debridement and bone biopsy. Patient is in NAD and resting in bed upon encounter, responds to tactile stimuli.    Vital Signs Last 24 Hrs  T(C): 37.2 (03 Aug 2018 05:11), Max: 37.2 (03 Aug 2018 05:11)  T(F): 98.9 (03 Aug 2018 05:11), Max: 98.9 (03 Aug 2018 05:11)  HR: 70 (03 Aug 2018 05:11) (69 - 73)  BP: 142/76 (03 Aug 2018 05:11) (110/56 - 142/76)  BP(mean): --  RR: 17 (03 Aug 2018 05:11) (17 - 19)  SpO2: 99% (03 Aug 2018 05:11) (97% - 99%)                          9.9    9.96  )-----------( 366      ( 03 Aug 2018 07:30 )             30.0       08-03    140  |  104  |  15  ----------------------------<  117<H>  3.8   |  27  |  1.00    Ca    8.3<L>      03 Aug 2018 07:30    TPro  6.9  /  Alb  2.5<L>  /  TBili  0.9  /  DBili  x   /  AST  15  /  ALT  <6<L>  /  AlkPhos  118  08-02      Objective exam:   vasc: pedal pulses mildly palpable; CFT < 3 seconds x10; TG WNL; no edema noted  Derm:  -wound to lateral plantar left heel noted to measure approximately 6cm x 6cm x 0.2 without probe to bone with fibro-granular base (80:20) and mild serous drainage noted to the dressing; mild malodor present, no periwound erythema or hyperkeratosis noted  -wound to plantar medial right heel noted to measure approximately 3cm x 4cm x 0.1cm without probe to bone with fibro-granular base (30:70) without drainage noted to the dressing, no malodor noted, no periwound erythema or hyperkeratosis noted

## 2018-08-04 LAB
ANION GAP SERPL CALC-SCNC: 10 MMOL/L — SIGNIFICANT CHANGE UP (ref 5–17)
BUN SERPL-MCNC: 15 MG/DL — SIGNIFICANT CHANGE UP (ref 7–23)
CALCIUM SERPL-MCNC: 8.4 MG/DL — LOW (ref 8.5–10.1)
CHLORIDE SERPL-SCNC: 103 MMOL/L — SIGNIFICANT CHANGE UP (ref 96–108)
CO2 SERPL-SCNC: 27 MMOL/L — SIGNIFICANT CHANGE UP (ref 22–31)
CREAT SERPL-MCNC: 1.2 MG/DL — SIGNIFICANT CHANGE UP (ref 0.5–1.3)
GLUCOSE SERPL-MCNC: 140 MG/DL — HIGH (ref 70–99)
HCT VFR BLD CALC: 30.9 % — LOW (ref 34.5–45)
HGB BLD-MCNC: 10.6 G/DL — LOW (ref 11.5–15.5)
MCHC RBC-ENTMCNC: 29.8 PG — SIGNIFICANT CHANGE UP (ref 27–34)
MCHC RBC-ENTMCNC: 34.3 GM/DL — SIGNIFICANT CHANGE UP (ref 32–36)
MCV RBC AUTO: 86.8 FL — SIGNIFICANT CHANGE UP (ref 80–100)
NRBC # BLD: 0 /100 WBCS — SIGNIFICANT CHANGE UP (ref 0–0)
PLATELET # BLD AUTO: 379 K/UL — SIGNIFICANT CHANGE UP (ref 150–400)
POTASSIUM SERPL-MCNC: 3.5 MMOL/L — SIGNIFICANT CHANGE UP (ref 3.5–5.3)
POTASSIUM SERPL-SCNC: 3.5 MMOL/L — SIGNIFICANT CHANGE UP (ref 3.5–5.3)
RBC # BLD: 3.56 M/UL — LOW (ref 3.8–5.2)
RBC # FLD: 15 % — HIGH (ref 10.3–14.5)
SODIUM SERPL-SCNC: 140 MMOL/L — SIGNIFICANT CHANGE UP (ref 135–145)
WBC # BLD: 12.71 K/UL — HIGH (ref 3.8–10.5)
WBC # FLD AUTO: 12.71 K/UL — HIGH (ref 3.8–10.5)

## 2018-08-04 PROCEDURE — 99233 SBSQ HOSP IP/OBS HIGH 50: CPT | Mod: GC

## 2018-08-04 RX ORDER — ASCORBIC ACID 60 MG
500 TABLET,CHEWABLE ORAL DAILY
Refills: 0 | Status: CANCELLED | OUTPATIENT
Start: 2018-08-04 | End: 2018-08-13

## 2018-08-04 RX ORDER — MULTIVIT-MIN/FERROUS GLUCONATE 9 MG/15 ML
1 LIQUID (ML) ORAL DAILY
Refills: 0 | Status: CANCELLED | OUTPATIENT
Start: 2018-08-04 | End: 2018-08-13

## 2018-08-04 RX ADMIN — Medication 250 MILLIGRAM(S): at 05:42

## 2018-08-04 RX ADMIN — Medication 1 APPLICATION(S): at 12:10

## 2018-08-04 RX ADMIN — CARBIDOPA AND LEVODOPA 1 TABLET(S): 25; 100 TABLET ORAL at 21:07

## 2018-08-04 RX ADMIN — SIMVASTATIN 10 MILLIGRAM(S): 20 TABLET, FILM COATED ORAL at 21:07

## 2018-08-04 RX ADMIN — PIPERACILLIN AND TAZOBACTAM 25 GRAM(S): 4; .5 INJECTION, POWDER, LYOPHILIZED, FOR SOLUTION INTRAVENOUS at 21:07

## 2018-08-04 RX ADMIN — PIPERACILLIN AND TAZOBACTAM 25 GRAM(S): 4; .5 INJECTION, POWDER, LYOPHILIZED, FOR SOLUTION INTRAVENOUS at 05:42

## 2018-08-04 RX ADMIN — RIVAROXABAN 15 MILLIGRAM(S): KIT at 17:42

## 2018-08-04 RX ADMIN — PIPERACILLIN AND TAZOBACTAM 25 GRAM(S): 4; .5 INJECTION, POWDER, LYOPHILIZED, FOR SOLUTION INTRAVENOUS at 13:37

## 2018-08-04 RX ADMIN — MIRTAZAPINE 7.5 MILLIGRAM(S): 45 TABLET, ORALLY DISINTEGRATING ORAL at 12:18

## 2018-08-04 RX ADMIN — RASAGILINE 0.5 MILLIGRAM(S): 0.5 TABLET ORAL at 12:18

## 2018-08-04 RX ADMIN — CARBIDOPA AND LEVODOPA 1 TABLET(S): 25; 100 TABLET ORAL at 05:42

## 2018-08-04 RX ADMIN — Medication 250 MILLIGRAM(S): at 17:42

## 2018-08-04 RX ADMIN — Medication 25 MICROGRAM(S): at 05:42

## 2018-08-04 RX ADMIN — CARBIDOPA AND LEVODOPA 1 TABLET(S): 25; 100 TABLET ORAL at 13:37

## 2018-08-04 NOTE — CHART NOTE - NSCHARTNOTEFT_GEN_A_CORE
Assessment: Pt seen for malnutrition follow-up. As per chart pt is an 85 year old female with PMH of multiple medical problems, admitted for cellulitis of both heels with stage 3 pressure ulcers and cellulitis, suspect left heel acute Osteomyelitis. Pt sleeping at time of visit, spoke to family at bedside. Per pt's family pt with poor appetite and PO intake. Per chart pt is consuming about 50% of meals in house. +BM 8/3.     Factors impacting intake:  [x] other: lethargic, decreased appetite    Diet Presciption: Diet, DASH/TLC:   Sodium & Cholesterol Restricted (07-31-18 @ 13:23)    Intake: poor     Current Weight: (8/1) 167.7lbs  % Weight Change- No updated weight since previous RD note.     Pertinent Medications: MEDICATIONS  (STANDING):  ATENolol  Tablet 25 milliGRAM(s) Oral daily  carbidopa/levodopa  25/100 1 Tablet(s) Oral three times a day  collagenase Ointment 1 Application(s) Topical daily  levothyroxine 25 MICROGram(s) Oral daily  mirtazapine 7.5 milliGRAM(s) Oral daily  piperacillin/tazobactam IVPB. 3.375 Gram(s) IV Intermittent every 8 hours  rasagiline Tablet 0.5 milliGRAM(s) Oral daily  rivaroxaban 15 milliGRAM(s) Oral every 24 hours  simvastatin 10 milliGRAM(s) Oral at bedtime  vancomycin  IVPB 1000 milliGRAM(s) IV Intermittent every 12 hours    MEDICATIONS  (PRN):  acetaminophen   Tablet 650 milliGRAM(s) Oral every 6 hours PRN For Temp greater than 38 C (100.4 F)  acetaminophen   Tablet. 650 milliGRAM(s) Oral every 6 hours PRN Moderate Pain (4 - 6)    Pertinent Labs: 08-04 Na140 mmol/L Glu 140 mg/dL<H> K+ 3.5 mmol/L Cr  1.20 mg/dL BUN 15 mg/dL 08-02 Alb 2.5 g/dL<L>     CAPILLARY BLOOD GLUCOSE    Skin: unstagable right heel, right ankle, left heel, left buttock; stage 2 saccum pressure injuries  DTI of right dorsal heel, right foot  No edema noted at this time    Estimated Needs:   [x ] no change since previous assessment  [ ] recalculated:     Previous Nutrition Diagnosis:    [x ] Malnutrition     Nutrition Diagnosis is [x ] ongoing- being addressed with oral nutritional supplements     New Nutrition Diagnosis: [x ] not applicable       Interventions:   Recommend  [ ] Change Diet To:  [ x] Nutrition Supplement: Recommend to add Ensure BID, Skinny BID; Recommend MVI/daily, Vitamin C/daily   [ ] Nutrition Support  [x ] Other:  1) Continue with current DASH/TLC diet  2) Offered to obtain pt's food preferences, declined at this time  3) Encourage good PO intake  4) Pt and pt's family aware of RD to remain available      Monitoring and Evaluation:   [x ] PO intake [ x ] Tolerance to diet prescription [ x ] weights [ x ] labs[ x ] follow up per protocol  [ ] other:

## 2018-08-04 NOTE — PROGRESS NOTE ADULT - ASSESSMENT
Patient is an 85 year old female multiple medical problems, admitted for cellulitis of both heels with stage 3 pressure ulcers and cellulitis, suspect left heel acute Osteomyelitis  s/p biopsy today by podiatry at bedside waiting path .

## 2018-08-04 NOTE — PROGRESS NOTE ADULT - PROBLEM SELECTOR PLAN 1
-MRI of R foot showed no osteomyelitis, but MRI of left foot has  possible osteomyelitis  -ID recommended bone biopsy done   -Podiatry Dr. Vargas, will perform bone biopsy at bedside today  -Continue IV Vancomycin 1000 mg Q12  -Continue to follow Vanc trough (16.4 8/1/18), monitor renal function   -Continue 3.375 Zosyn mg IV Q8H   -Continue collagenese ointment in bilateral heels  -Continue off-loading of heels bilaterally  -Dr. Álvarez, wound care recommendations appreciated. Continue Collagenase, protective dressing

## 2018-08-04 NOTE — PROGRESS NOTE ADULT - ATTENDING COMMENTS
pt seen and examine see above - Patient is an 85 year old female multiple medical problems, admitted for cellulitis of both heels with stage 3 pressure ulcers and cellulitis, suspect left heel acute Osteomyelitis  s/p biopsy today by podiatry at bedside. F/U Pathology report .

## 2018-08-04 NOTE — PROGRESS NOTE ADULT - SUBJECTIVE AND OBJECTIVE BOX
Patient is a 85y old  Female who presents with a chief complaint of heel wounds (31 Jul 2018 14:16)      HPI:  85F with PMH HTN, a fib on Xarelto, Parkinson's Dx, Hx of CVA, bedbound presents from [facility] for evaluation of bilateral heel wounds. Patient A&O1-2 and doesn't answer questions appropriately, history obtained from family at bedside. Patient reports left sided ankle pain. Per family, patient had bilateral wounds for 7-9 months, being treated by wound care center in [place]. Visiting nurse assessed wounds today and stated R heel looked worse compared to last week and sent patient to the ED. Visiting nurse changed dressing on bilateral feet wounds. Family also requesting patient to become DNR and discuss advance directives.    admitted with suspect left heel acute Osteomyelitis  s/p biopsy today by podiatry at bedside waiting path . pt seen and examine pt sleeping , no distress , no fever.     ros -  unable to obtain sec to dementia     INTERVAL HPI/OVERNIGHT EVENTS:  T(C): 37.2 (08-04-18 @ 04:30), Max: 37.2 (08-04-18 @ 04:30)  HR: 87 (08-04-18 @ 04:30) (70 - 87)  BP: 102/66 (08-04-18 @ 04:30) (99/63 - 114/72)  RR: 17 (08-04-18 @ 04:30) (17 - 18)  SpO2: 97% (08-04-18 @ 04:30) (96% - 97%)  Wt(kg): --  I&O's Summary    03 Aug 2018 07:01  -  04 Aug 2018 07:00  --------------------------------------------------------  IN: 600 mL / OUT: 0 mL / NET: 600 mL      PHYSICAL EXAM:  GENERAL: No acute distress, well-groomed, well-developed  HEAD:  Atraumatic, Normocephalic  NECK: Supple,  CHEST/LUNG: Clear to auscultation bilaterally; No rales, rhonchi, wheezing,   HEART: Regular rate and rhythm; No murmurs, no tachy   ABDOMEN: Soft, Nontender, Nondistended; Bowel sounds present  EXTREMITIES: Bilateral wrapped bandages on feet. No clubbing , cyanosis or edema   SKIN: No rashes or lesions        MEDICATIONS  (STANDING):  ATENolol  Tablet 25 milliGRAM(s) Oral daily  carbidopa/levodopa  25/100 1 Tablet(s) Oral three times a day  collagenase Ointment 1 Application(s) Topical daily  levothyroxine 25 MICROGram(s) Oral daily  mirtazapine 7.5 milliGRAM(s) Oral daily  piperacillin/tazobactam IVPB. 3.375 Gram(s) IV Intermittent every 8 hours  rasagiline Tablet 0.5 milliGRAM(s) Oral daily  rivaroxaban 15 milliGRAM(s) Oral every 24 hours  simvastatin 10 milliGRAM(s) Oral at bedtime  vancomycin  IVPB 1000 milliGRAM(s) IV Intermittent every 12 hours    MEDICATIONS  (PRN):  acetaminophen   Tablet 650 milliGRAM(s) Oral every 6 hours PRN For Temp greater than 38 C (100.4 F)  acetaminophen   Tablet. 650 milliGRAM(s) Oral every 6 hours PRN Moderate Pain (4 - 6)      LABS:                        10.6   12.71 )-----------( 379      ( 04 Aug 2018 06:51 )             30.9     08-04    140  |  103  |  15  ----------------------------<  140<H>  3.5   |  27  |  1.20    Ca    8.4<L>      04 Aug 2018 06:51                        RADIOLOGY & ADDITIONAL TESTS:    Imaging Personally Reviewed:   no new test     Advance Directives:  dnr     Palliative Care:  appropriate

## 2018-08-05 LAB
-  AMIKACIN: SIGNIFICANT CHANGE UP
-  AMPICILLIN/SULBACTAM: SIGNIFICANT CHANGE UP
-  AZTREONAM: SIGNIFICANT CHANGE UP
-  CEFAZOLIN: SIGNIFICANT CHANGE UP
-  CEFEPIME: SIGNIFICANT CHANGE UP
-  CEFTAZIDIME: SIGNIFICANT CHANGE UP
-  CIPROFLOXACIN: SIGNIFICANT CHANGE UP
-  CLINDAMYCIN: SIGNIFICANT CHANGE UP
-  DAPTOMYCIN: SIGNIFICANT CHANGE UP
-  ERYTHROMYCIN: SIGNIFICANT CHANGE UP
-  GENTAMICIN: SIGNIFICANT CHANGE UP
-  GENTAMICIN: SIGNIFICANT CHANGE UP
-  IMIPENEM: SIGNIFICANT CHANGE UP
-  LEVOFLOXACIN: SIGNIFICANT CHANGE UP
-  LINEZOLID: SIGNIFICANT CHANGE UP
-  MEROPENEM: SIGNIFICANT CHANGE UP
-  OXACILLIN: SIGNIFICANT CHANGE UP
-  PENICILLIN: SIGNIFICANT CHANGE UP
-  PIPERACILLIN/TAZOBACTAM: SIGNIFICANT CHANGE UP
-  RIFAMPIN: SIGNIFICANT CHANGE UP
-  TETRACYCLINE: SIGNIFICANT CHANGE UP
-  TOBRAMYCIN: SIGNIFICANT CHANGE UP
-  TRIMETHOPRIM/SULFAMETHOXAZOLE: SIGNIFICANT CHANGE UP
-  VANCOMYCIN: SIGNIFICANT CHANGE UP
ANION GAP SERPL CALC-SCNC: 10 MMOL/L — SIGNIFICANT CHANGE UP (ref 5–17)
BASOPHILS # BLD AUTO: 0.03 K/UL — SIGNIFICANT CHANGE UP (ref 0–0.2)
BASOPHILS NFR BLD AUTO: 0.3 % — SIGNIFICANT CHANGE UP (ref 0–2)
BUN SERPL-MCNC: 16 MG/DL — SIGNIFICANT CHANGE UP (ref 7–23)
CALCIUM SERPL-MCNC: 8.4 MG/DL — LOW (ref 8.5–10.1)
CHLORIDE SERPL-SCNC: 103 MMOL/L — SIGNIFICANT CHANGE UP (ref 96–108)
CO2 SERPL-SCNC: 29 MMOL/L — SIGNIFICANT CHANGE UP (ref 22–31)
CREAT SERPL-MCNC: 1.1 MG/DL — SIGNIFICANT CHANGE UP (ref 0.5–1.3)
EOSINOPHIL # BLD AUTO: 0.11 K/UL — SIGNIFICANT CHANGE UP (ref 0–0.5)
EOSINOPHIL NFR BLD AUTO: 1.2 % — SIGNIFICANT CHANGE UP (ref 0–6)
GLUCOSE SERPL-MCNC: 93 MG/DL — SIGNIFICANT CHANGE UP (ref 70–99)
HCT VFR BLD CALC: 29.4 % — LOW (ref 34.5–45)
HGB BLD-MCNC: 9.8 G/DL — LOW (ref 11.5–15.5)
IMM GRANULOCYTES NFR BLD AUTO: 0.3 % — SIGNIFICANT CHANGE UP (ref 0–1.5)
LYMPHOCYTES # BLD AUTO: 1.8 K/UL — SIGNIFICANT CHANGE UP (ref 1–3.3)
LYMPHOCYTES # BLD AUTO: 20.2 % — SIGNIFICANT CHANGE UP (ref 13–44)
MCHC RBC-ENTMCNC: 29.3 PG — SIGNIFICANT CHANGE UP (ref 27–34)
MCHC RBC-ENTMCNC: 33.3 GM/DL — SIGNIFICANT CHANGE UP (ref 32–36)
MCV RBC AUTO: 88 FL — SIGNIFICANT CHANGE UP (ref 80–100)
METHOD TYPE: SIGNIFICANT CHANGE UP
METHOD TYPE: SIGNIFICANT CHANGE UP
MONOCYTES # BLD AUTO: 0.81 K/UL — SIGNIFICANT CHANGE UP (ref 0–0.9)
MONOCYTES NFR BLD AUTO: 9.1 % — SIGNIFICANT CHANGE UP (ref 2–14)
NEUTROPHILS # BLD AUTO: 6.13 K/UL — SIGNIFICANT CHANGE UP (ref 1.8–7.4)
NEUTROPHILS NFR BLD AUTO: 68.9 % — SIGNIFICANT CHANGE UP (ref 43–77)
NRBC # BLD: 0 /100 WBCS — SIGNIFICANT CHANGE UP (ref 0–0)
PLATELET # BLD AUTO: 350 K/UL — SIGNIFICANT CHANGE UP (ref 150–400)
POTASSIUM SERPL-MCNC: 3.7 MMOL/L — SIGNIFICANT CHANGE UP (ref 3.5–5.3)
POTASSIUM SERPL-SCNC: 3.7 MMOL/L — SIGNIFICANT CHANGE UP (ref 3.5–5.3)
RBC # BLD: 3.34 M/UL — LOW (ref 3.8–5.2)
RBC # FLD: 15.1 % — HIGH (ref 10.3–14.5)
SODIUM SERPL-SCNC: 142 MMOL/L — SIGNIFICANT CHANGE UP (ref 135–145)
WBC # BLD: 8.91 K/UL — SIGNIFICANT CHANGE UP (ref 3.8–10.5)
WBC # FLD AUTO: 8.91 K/UL — SIGNIFICANT CHANGE UP (ref 3.8–10.5)

## 2018-08-05 PROCEDURE — 99233 SBSQ HOSP IP/OBS HIGH 50: CPT | Mod: GC

## 2018-08-05 RX ADMIN — SIMVASTATIN 10 MILLIGRAM(S): 20 TABLET, FILM COATED ORAL at 22:51

## 2018-08-05 RX ADMIN — PIPERACILLIN AND TAZOBACTAM 25 GRAM(S): 4; .5 INJECTION, POWDER, LYOPHILIZED, FOR SOLUTION INTRAVENOUS at 13:23

## 2018-08-05 RX ADMIN — PIPERACILLIN AND TAZOBACTAM 25 GRAM(S): 4; .5 INJECTION, POWDER, LYOPHILIZED, FOR SOLUTION INTRAVENOUS at 05:20

## 2018-08-05 RX ADMIN — Medication 250 MILLIGRAM(S): at 17:15

## 2018-08-05 RX ADMIN — PIPERACILLIN AND TAZOBACTAM 25 GRAM(S): 4; .5 INJECTION, POWDER, LYOPHILIZED, FOR SOLUTION INTRAVENOUS at 22:52

## 2018-08-05 RX ADMIN — Medication 25 MICROGRAM(S): at 05:20

## 2018-08-05 RX ADMIN — CARBIDOPA AND LEVODOPA 1 TABLET(S): 25; 100 TABLET ORAL at 22:51

## 2018-08-05 RX ADMIN — Medication 250 MILLIGRAM(S): at 05:20

## 2018-08-05 RX ADMIN — CARBIDOPA AND LEVODOPA 1 TABLET(S): 25; 100 TABLET ORAL at 05:20

## 2018-08-05 RX ADMIN — RIVAROXABAN 15 MILLIGRAM(S): KIT at 17:15

## 2018-08-05 RX ADMIN — CARBIDOPA AND LEVODOPA 1 TABLET(S): 25; 100 TABLET ORAL at 13:23

## 2018-08-05 RX ADMIN — Medication 1 APPLICATION(S): at 11:35

## 2018-08-05 RX ADMIN — MIRTAZAPINE 7.5 MILLIGRAM(S): 45 TABLET, ORALLY DISINTEGRATING ORAL at 11:35

## 2018-08-05 RX ADMIN — ATENOLOL 25 MILLIGRAM(S): 25 TABLET ORAL at 05:20

## 2018-08-05 RX ADMIN — RASAGILINE 0.5 MILLIGRAM(S): 0.5 TABLET ORAL at 11:35

## 2018-08-05 NOTE — PROGRESS NOTE ADULT - ATTENDING COMMENTS
pt seen and examine see above - Patient is an 85 year old female multiple medical problems, admitted for cellulitis of both heels with stage 3 pressure ulcers and cellulitis, suspect left heel acute Osteomyelitis  s/p biopsy  by podiatry at bedside. F/U Pathology report .

## 2018-08-05 NOTE — PROGRESS NOTE ADULT - SUBJECTIVE AND OBJECTIVE BOX
Patient is a 85y old  Female who presents with a chief complaint of heel wounds (31 Jul 2018 14:16)      HPI:  85F with PMH HTN, a fib on Xarelto, Parkinson's Dx, Hx of CVA, bedbound presents from [facility] for evaluation of bilateral heel wounds. Patient A&O1-2 and doesn't answer questions appropriately, history obtained from family at bedside. Patient reports left sided ankle pain. Per family, patient had bilateral wounds for 7-9 months, being treated by wound care center in [place]. Visiting nurse assessed wounds today and stated R heel looked worse compared to last week and sent patient to the ED. Visiting nurse changed dressing on bilateral feet wounds. Family also requesting patient to become DNR and discuss advance directives.   admitted with suspect left heel acute Osteomyelitis  s/p biopsy today by podiatry at bedside waiting path . pt seen and examine pt  alert awake today   , no distress , no fever.     ros -  unable to obtain sec to dementia     INTERVAL HPI/OVERNIGHT EVENTS:  T(C): 37.1 (08-05-18 @ 05:15), Max: 37.1 (08-05-18 @ 05:15)  HR: 78 (08-05-18 @ 05:15) (73 - 78)  BP: 118/72 (08-05-18 @ 05:15) (110/65 - 118/72)  RR: 17 (08-05-18 @ 05:15) (17 - 17)  SpO2: 98% (08-05-18 @ 05:15) (97% - 99%)  Wt(kg): --  I&O's Summary        PHYSICAL EXAM:  GENERAL: No acute distress, weak   HEAD:  Atraumatic, Normocephalic  NECK: Supple,  CHEST/LUNG: Clear to auscultation bilaterally; No rales, rhonchi, wheezing,   HEART: Regular rate and rhythm; No murmurs, no tachy   ABDOMEN: Soft, Nontender, Nondistended; Bowel sounds present  EXTREMITIES: Bilateral wrapped bandages on feet. No clubbing , cyanosis or edema   SKIN: No rashes or lesions          MEDICATIONS  (STANDING):  ATENolol  Tablet 25 milliGRAM(s) Oral daily  carbidopa/levodopa  25/100 1 Tablet(s) Oral three times a day  collagenase Ointment 1 Application(s) Topical daily  levothyroxine 25 MICROGram(s) Oral daily  mirtazapine 7.5 milliGRAM(s) Oral daily  piperacillin/tazobactam IVPB. 3.375 Gram(s) IV Intermittent every 8 hours  rasagiline Tablet 0.5 milliGRAM(s) Oral daily  rivaroxaban 15 milliGRAM(s) Oral every 24 hours  simvastatin 10 milliGRAM(s) Oral at bedtime  vancomycin  IVPB 1000 milliGRAM(s) IV Intermittent every 12 hours    MEDICATIONS  (PRN):  acetaminophen   Tablet 650 milliGRAM(s) Oral every 6 hours PRN For Temp greater than 38 C (100.4 F)  acetaminophen   Tablet. 650 milliGRAM(s) Oral every 6 hours PRN Moderate Pain (4 - 6)      LABS:                        9.8    8.91  )-----------( 350      ( 05 Aug 2018 06:53 )             29.4     08-05    142  |  103  |  16  ----------------------------<  93  3.7   |  29  |  1.10    Ca    8.4<L>      05 Aug 2018 06:53                    08-03 @ 16:46   Rare Gram Negative Rods  --  --  08-03 @ 16:34   Moderate Pseudomonas aeruginosa  Moderate Staphylococcus aureus  --  --          RADIOLOGY & ADDITIONAL TESTS:    Imaging Personally Reviewed:   no new test     Advance Directives:  full code     Palliative Care: appropriate

## 2018-08-05 NOTE — PROGRESS NOTE ADULT - PROBLEM SELECTOR PLAN 1
-MRI of R foot showed no osteomyelitis, but MRI of left foot has  possible osteomyelitis    -Podiatry Dr. Vargas, performed bone biopsy at bedside   -Continue IV Vancomycin 1000 mg Q12  -Continue to follow Vanc trough (16.4 8/1/18), monitor renal function   -Continue 3.375 Zosyn mg IV Q8H   -Continue collagenese ointment in bilateral heels  -Continue off-loading of heels bilaterally  -Dr. Álvarez, wound care recommendations appreciated. Continue Collagenase, protective dressing

## 2018-08-05 NOTE — PROVIDER CONTACT NOTE (CRITICAL VALUE NOTIFICATION) - SITUATION
tissue culture sent on 8/3 18 has rare gram neg rodes, mod pseudomonus aeruginosa and mod staphylo coccus aureus.

## 2018-08-06 LAB
-  AMIKACIN: SIGNIFICANT CHANGE UP
-  AMIKACIN: SIGNIFICANT CHANGE UP
-  AMOXICILLIN/CLAVULANIC ACID: SIGNIFICANT CHANGE UP
-  AMOXICILLIN/CLAVULANIC ACID: SIGNIFICANT CHANGE UP
-  AMPICILLIN/SULBACTAM: SIGNIFICANT CHANGE UP
-  AMPICILLIN: SIGNIFICANT CHANGE UP
-  AMPICILLIN: SIGNIFICANT CHANGE UP
-  AZTREONAM: SIGNIFICANT CHANGE UP
-  AZTREONAM: SIGNIFICANT CHANGE UP
-  CEFAZOLIN: SIGNIFICANT CHANGE UP
-  CEFEPIME: SIGNIFICANT CHANGE UP
-  CEFEPIME: SIGNIFICANT CHANGE UP
-  CEFOXITIN: SIGNIFICANT CHANGE UP
-  CEFOXITIN: SIGNIFICANT CHANGE UP
-  CEFTRIAXONE: SIGNIFICANT CHANGE UP
-  CEFTRIAXONE: SIGNIFICANT CHANGE UP
-  CIPROFLOXACIN: SIGNIFICANT CHANGE UP
-  CIPROFLOXACIN: SIGNIFICANT CHANGE UP
-  CLINDAMYCIN: SIGNIFICANT CHANGE UP
-  DAPTOMYCIN: SIGNIFICANT CHANGE UP
-  ERTAPENEM: SIGNIFICANT CHANGE UP
-  ERTAPENEM: SIGNIFICANT CHANGE UP
-  ERYTHROMYCIN: SIGNIFICANT CHANGE UP
-  GENTAMICIN: SIGNIFICANT CHANGE UP
-  IMIPENEM: SIGNIFICANT CHANGE UP
-  IMIPENEM: SIGNIFICANT CHANGE UP
-  LEVOFLOXACIN: SIGNIFICANT CHANGE UP
-  LEVOFLOXACIN: SIGNIFICANT CHANGE UP
-  LINEZOLID: SIGNIFICANT CHANGE UP
-  MEROPENEM: SIGNIFICANT CHANGE UP
-  MEROPENEM: SIGNIFICANT CHANGE UP
-  OXACILLIN: SIGNIFICANT CHANGE UP
-  PENICILLIN: SIGNIFICANT CHANGE UP
-  PIPERACILLIN/TAZOBACTAM: SIGNIFICANT CHANGE UP
-  PIPERACILLIN/TAZOBACTAM: SIGNIFICANT CHANGE UP
-  RIFAMPIN: SIGNIFICANT CHANGE UP
-  TETRACYCLINE: SIGNIFICANT CHANGE UP
-  TOBRAMYCIN: SIGNIFICANT CHANGE UP
-  TOBRAMYCIN: SIGNIFICANT CHANGE UP
-  TRIMETHOPRIM/SULFAMETHOXAZOLE: SIGNIFICANT CHANGE UP
-  VANCOMYCIN: SIGNIFICANT CHANGE UP
ANION GAP SERPL CALC-SCNC: 10 MMOL/L — SIGNIFICANT CHANGE UP (ref 5–17)
BUN SERPL-MCNC: 19 MG/DL — SIGNIFICANT CHANGE UP (ref 7–23)
CALCIUM SERPL-MCNC: 9 MG/DL — SIGNIFICANT CHANGE UP (ref 8.5–10.1)
CHLORIDE SERPL-SCNC: 104 MMOL/L — SIGNIFICANT CHANGE UP (ref 96–108)
CO2 SERPL-SCNC: 30 MMOL/L — SIGNIFICANT CHANGE UP (ref 22–31)
CREAT SERPL-MCNC: 1.7 MG/DL — HIGH (ref 0.5–1.3)
GLUCOSE SERPL-MCNC: 86 MG/DL — SIGNIFICANT CHANGE UP (ref 70–99)
GRAM STN FLD: SIGNIFICANT CHANGE UP
HCT VFR BLD CALC: 30 % — LOW (ref 34.5–45)
HGB BLD-MCNC: 9.6 G/DL — LOW (ref 11.5–15.5)
MCHC RBC-ENTMCNC: 29.1 PG — SIGNIFICANT CHANGE UP (ref 27–34)
MCHC RBC-ENTMCNC: 32 GM/DL — SIGNIFICANT CHANGE UP (ref 32–36)
MCV RBC AUTO: 90.9 FL — SIGNIFICANT CHANGE UP (ref 80–100)
METHOD TYPE: SIGNIFICANT CHANGE UP
NRBC # BLD: 0 /100 WBCS — SIGNIFICANT CHANGE UP (ref 0–0)
PLATELET # BLD AUTO: 344 K/UL — SIGNIFICANT CHANGE UP (ref 150–400)
POTASSIUM SERPL-MCNC: 3.8 MMOL/L — SIGNIFICANT CHANGE UP (ref 3.5–5.3)
POTASSIUM SERPL-SCNC: 3.8 MMOL/L — SIGNIFICANT CHANGE UP (ref 3.5–5.3)
RBC # BLD: 3.3 M/UL — LOW (ref 3.8–5.2)
RBC # FLD: 15.1 % — HIGH (ref 10.3–14.5)
SODIUM SERPL-SCNC: 144 MMOL/L — SIGNIFICANT CHANGE UP (ref 135–145)
VANCOMYCIN TROUGH SERPL-MCNC: 34.2 UG/ML — CRITICAL HIGH (ref 10–20)
WBC # BLD: 8.93 K/UL — SIGNIFICANT CHANGE UP (ref 3.8–10.5)
WBC # FLD AUTO: 8.93 K/UL — SIGNIFICANT CHANGE UP (ref 3.8–10.5)

## 2018-08-06 PROCEDURE — 99233 SBSQ HOSP IP/OBS HIGH 50: CPT | Mod: GC

## 2018-08-06 RX ORDER — SODIUM CHLORIDE 9 MG/ML
1000 INJECTION INTRAMUSCULAR; INTRAVENOUS; SUBCUTANEOUS
Refills: 0 | Status: DISCONTINUED | OUTPATIENT
Start: 2018-08-06 | End: 2018-08-07

## 2018-08-06 RX ORDER — COLLAGENASE CLOSTRIDIUM HIST. 250 UNIT/G
1 OINTMENT (GRAM) TOPICAL DAILY
Refills: 0 | Status: DISCONTINUED | OUTPATIENT
Start: 2018-08-06 | End: 2018-08-06

## 2018-08-06 RX ADMIN — Medication 25 MICROGRAM(S): at 05:43

## 2018-08-06 RX ADMIN — Medication 1 APPLICATION(S): at 12:02

## 2018-08-06 RX ADMIN — CARBIDOPA AND LEVODOPA 1 TABLET(S): 25; 100 TABLET ORAL at 21:26

## 2018-08-06 RX ADMIN — CARBIDOPA AND LEVODOPA 1 TABLET(S): 25; 100 TABLET ORAL at 05:43

## 2018-08-06 RX ADMIN — CARBIDOPA AND LEVODOPA 1 TABLET(S): 25; 100 TABLET ORAL at 13:37

## 2018-08-06 RX ADMIN — ATENOLOL 25 MILLIGRAM(S): 25 TABLET ORAL at 05:43

## 2018-08-06 RX ADMIN — PIPERACILLIN AND TAZOBACTAM 25 GRAM(S): 4; .5 INJECTION, POWDER, LYOPHILIZED, FOR SOLUTION INTRAVENOUS at 05:42

## 2018-08-06 RX ADMIN — RIVAROXABAN 15 MILLIGRAM(S): KIT at 17:59

## 2018-08-06 RX ADMIN — SIMVASTATIN 10 MILLIGRAM(S): 20 TABLET, FILM COATED ORAL at 21:26

## 2018-08-06 RX ADMIN — PIPERACILLIN AND TAZOBACTAM 25 GRAM(S): 4; .5 INJECTION, POWDER, LYOPHILIZED, FOR SOLUTION INTRAVENOUS at 21:25

## 2018-08-06 RX ADMIN — Medication 250 MILLIGRAM(S): at 05:42

## 2018-08-06 RX ADMIN — PIPERACILLIN AND TAZOBACTAM 25 GRAM(S): 4; .5 INJECTION, POWDER, LYOPHILIZED, FOR SOLUTION INTRAVENOUS at 13:37

## 2018-08-06 RX ADMIN — RASAGILINE 0.5 MILLIGRAM(S): 0.5 TABLET ORAL at 12:08

## 2018-08-06 RX ADMIN — MIRTAZAPINE 7.5 MILLIGRAM(S): 45 TABLET, ORALLY DISINTEGRATING ORAL at 12:11

## 2018-08-06 RX ADMIN — SODIUM CHLORIDE 75 MILLILITER(S): 9 INJECTION INTRAMUSCULAR; INTRAVENOUS; SUBCUTANEOUS at 13:37

## 2018-08-06 NOTE — PROGRESS NOTE ADULT - SUBJECTIVE AND OBJECTIVE BOX
Patient is a 85y old  Female who presents with a chief complaint of heel wounds (31 Jul 2018 14:16)      HPI:  85F with PMH HTN, a fib on Xarelto, Parkinson's Dx, Hx of CVA, bedbound presents from [facility] for evaluation of bilateral heel wounds. Patient A&O1-2 and doesn't answer questions appropriately, history obtained from family at bedside. Patient reports left sided ankle pain. Per family, patient had bilateral wounds for 7-9 months, being treated by wound care center in [place]. Visiting nurse assessed wounds today and stated R heel looked worse compared to last week and sent patient to the ED. Visiting nurse changed dressing on bilateral feet wounds. Family also requesting patient to become DNR and discuss advance directives.     In the ED: temp 98.3, HR 68, BP 98/62, RR 18, SpO2 91% RA. ESR 80, BUN/Cr: 20/1.2. Chest xray: no active disease. Xray right foot: No acute fracture, dislocation, or destructive lesions evident. Xray left foot: No acute fracture, dislocation, or destructive bone lesions evident. Soft tissue ulcer may be present inferior to the left calcaneus visible on the oblique view. Received IV Vancomycin. Dr. Álvarez, Wound Care, evaluated patient in the ED. (31 Jul 2018 14:16)      INTERVAL HPI/OVERNIGHT EVENTS:  Patient seen at bedside with Son, Jai. Son expressed concern regarding Pt.'s wakefulness and eating habits. Informed Son that pt. ate 85% of her dinner last night as noted per nursing assistant. Patient unable to speak of events due to baseline mental status.     T(C): 37 (08-06-18 @ 04:46), Max: 37.2 (08-05-18 @ 20:21)  HR: 70 (08-06-18 @ 04:46) (68 - 70)  BP: 136/61 (08-06-18 @ 04:46) (111/55 - 136/61)  RR: 17 (08-06-18 @ 04:46) (17 - 20)  SpO2: 95% (08-06-18 @ 04:46) (93% - 100%)  Wt(kg): --  I&O's Summary    05 Aug 2018 07:01  -  06 Aug 2018 07:00  --------------------------------------------------------  IN: 450 mL / OUT: 0 mL / NET: 450 mL        REVIEW OF SYSTEMS:  Unable to assess due to patient's baseline mental status.     PHYSICAL EXAM:  GENERAL: NAD, well-nourished, well-developed  HEAD:  Atraumatic, Normocephalic  NECK: Supple, No JVD, Normal thyroid  CHEST/LUNG: Clear to percussion bilaterally; No rales, rhonchi, wheezing, or rubs  HEART: Regular rate and rhythm; No murmurs, rubs, or gallops  ABDOMEN: Soft, Nontender, Nondistended; Bowel sounds present  EXTREMITIES:  Wrapped bandages on feet bilaterally.   LYMPH: No lymphadenopathy noted  SKIN: No rashes or lesions    MEDICATIONS  (STANDING):  ATENolol  Tablet 25 milliGRAM(s) Oral daily  carbidopa/levodopa  25/100 1 Tablet(s) Oral three times a day  collagenase Ointment 1 Application(s) Topical daily  levothyroxine 25 MICROGram(s) Oral daily  mirtazapine 7.5 milliGRAM(s) Oral daily  piperacillin/tazobactam IVPB. 3.375 Gram(s) IV Intermittent every 8 hours  rasagiline Tablet 0.5 milliGRAM(s) Oral daily  rivaroxaban 15 milliGRAM(s) Oral every 24 hours  simvastatin 10 milliGRAM(s) Oral at bedtime    MEDICATIONS  (PRN):  acetaminophen   Tablet 650 milliGRAM(s) Oral every 6 hours PRN For Temp greater than 38 C (100.4 F)  acetaminophen   Tablet. 650 milliGRAM(s) Oral every 6 hours PRN Moderate Pain (4 - 6)      LABS:                        9.6    8.93  )-----------( 344      ( 06 Aug 2018 09:08 )             30.0     08-06    144  |  104  |  19  ----------------------------<  86  3.8   |  30  |  1.70<H>    Ca    9.0      06 Aug 2018 09:06          CAPILLARY BLOOD GLUCOSE      POCT Blood Glucose.: 100 mg/dL (05 Aug 2018 17:13)      08-03 @ 16:46   Rare Enterobacter cloacae/asburiae  Growth in fluid media only Staphylococcus aureus  --  --  08-03 @ 16:34   Moderate Pseudomonas aeruginosa (Carbapenem Resistant)  Moderate Methicillin resistant Staphylococcus aureus  Few Escherichia coli  Numerous Corynebacterium species  "Susceptibilities not performed"  --  Methicillin resistant Staphylococcus aureus          RADIOLOGY & ADDITIONAL TESTS:    Imaging Personally Reviewed:       Advance Directives:      Palliative Care: Patient is a 85y old  Female who presents with a chief complaint of heel wounds (31 Jul 2018 14:16)      HPI:  85F with PMH HTN, a fib on Xarelto, Parkinson's Dx, Hx of CVA, bedbound presents from [facility] for evaluation of bilateral heel wounds. Patient A&O1-2 and doesn't answer questions appropriately, history obtained from family at bedside. Patient reports left sided ankle pain. Per family, patient had bilateral wounds for 7-9 months, being treated by wound care center in [place]. Visiting nurse assessed wounds today and stated R heel looked worse compared to last week and sent patient to the ED. Visiting nurse changed dressing on bilateral feet wounds. Family also requesting patient to become DNR and discuss advance directives.     admitted for cellulitis of both heels with stage 3 pressure ulcers and cellulitis, suspect left heel acute Osteomyelitis  s/p biopsy  by podiatry at bedside.       INTERVAL HPI/OVERNIGHT EVENTS:  Patient seen at bedside with Son, Jai. Son expressed concern regarding Pt.'s wakefulness and eating habits. . Patient unable to speak of events due to baseline mental status.     T(C): 37 (08-06-18 @ 04:46), Max: 37.2 (08-05-18 @ 20:21)  HR: 70 (08-06-18 @ 04:46) (68 - 70)  BP: 136/61 (08-06-18 @ 04:46) (111/55 - 136/61)  RR: 17 (08-06-18 @ 04:46) (17 - 20)  SpO2: 95% (08-06-18 @ 04:46) (93% - 100%)  Wt(kg): --  I&O's Summary    05 Aug 2018 07:01  -  06 Aug 2018 07:00  --------------------------------------------------------  IN: 450 mL / OUT: 0 mL / NET: 450 mL        REVIEW OF SYSTEMS:  Unable to assess due to patient's baseline mental status.     PHYSICAL EXAM:  GENERAL: NAD, well-nourished, well-developed  HEAD:  Atraumatic, Normocephalic  NECK: Supple, No JVD,  CHEST/LUNG: Clear to percussion bilaterally; No rales, rhonchi, wheezing,  HEART: Regular rate and rhythm; No murmurs, no tachy   ABDOMEN: Soft, Nontender, Nondistended; Bowel sounds present  EXTREMITIES:  Wrapped bandages on feet bilaterally.   SKIN: No rashes or lesions    MEDICATIONS  (STANDING):  ATENolol  Tablet 25 milliGRAM(s) Oral daily  carbidopa/levodopa  25/100 1 Tablet(s) Oral three times a day  collagenase Ointment 1 Application(s) Topical daily  levothyroxine 25 MICROGram(s) Oral daily  mirtazapine 7.5 milliGRAM(s) Oral daily  piperacillin/tazobactam IVPB. 3.375 Gram(s) IV Intermittent every 8 hours  rasagiline Tablet 0.5 milliGRAM(s) Oral daily  rivaroxaban 15 milliGRAM(s) Oral every 24 hours  simvastatin 10 milliGRAM(s) Oral at bedtime    MEDICATIONS  (PRN):  acetaminophen   Tablet 650 milliGRAM(s) Oral every 6 hours PRN For Temp greater than 38 C (100.4 F)  acetaminophen   Tablet. 650 milliGRAM(s) Oral every 6 hours PRN Moderate Pain (4 - 6)      LABS:                        9.6    8.93  )-----------( 344      ( 06 Aug 2018 09:08 )             30.0     08-06    144  |  104  |  19  ----------------------------<  86  3.8   |  30  |  1.70<H>    Ca    9.0      06 Aug 2018 09:06              POCT Blood Glucose.: 100 mg/dL (05 Aug 2018 17:13)      08-03 @ 16:46   Rare Enterobacter cloacae/asburiae  Growth in fluid media only Staphylococcus aureus  --  --  08-03 @ 16:34   Moderate Pseudomonas aeruginosa (Carbapenem Resistant)  Moderate Methicillin resistant Staphylococcus aureus  Few Escherichia coli  Numerous Corynebacterium species  "Susceptibilities not performed"  --  Methicillin resistant Staphylococcus aureus          RADIOLOGY & ADDITIONAL TESTS:    Imaging Personally Reviewed:     no new test   Advance Directives:    dnr/dni

## 2018-08-06 NOTE — PROGRESS NOTE ADULT - ASSESSMENT
Patient is an 85 year old female admitted for bilateral heel cellulitis vs. L heel osteomyelitis. Patient is an 85 year old female admitted for bilateral heel cellulitis L heel osteomyelitis.

## 2018-08-06 NOTE — PROGRESS NOTE ADULT - PROBLEM SELECTOR PLAN 1
-MRI of R foot showed no osteomyelitis, but MRI of left foot has  possible osteomyelitis  -Results pending for L bone Bx done 8/3 and tissue culture showed MRSA    -Podiatry Dr. Vargas, performed bone biopsy at bedside   -Continue IV Vancomycin 1000 mg Q12  -Continue to follow Vanc trough (16.4 8/1/18), monitor renal function   -Continue 3.375 Zosyn mg IV Q8H   -Continue collagenese ointment in bilateral heels  -Continue off-loading of heels bilaterally  -Dr. Álvarez, wound care recommendations appreciated. Continue Collagenase, protective dressing  -D/C vanc as trough level elevated (37)  -F/U ID -MRI of R foot showed no osteomyelitis, but MRI of left foot has  possible osteomyelitis  -Results pending for L bone Bx done 8/3 and tissue culture showed MRSA    -Podiatry Dr. Vargas, performed bone biopsy at bedside   -Continue IV Vancomycin 1000 mg Q12  dc as high trough   -Continue to follow Vanc trough (16.4 8/1/18), monitor renal function   -Continue 3.375 Zosyn mg IV Q8H   -Continue collagenese ointment in bilateral heels  -Continue off-loading of heels bilaterally  -Dr. Álvarez, wound care recommendations appreciated. Continue Collagenase, protective dressing  -D/C vanc as trough level elevated (37)  -F/U ID

## 2018-08-06 NOTE — PROVIDER CONTACT NOTE (CRITICAL VALUE NOTIFICATION) - SITUATION
pt wound tissue culture positive for MRSA, no forenterobacter cloaecae,few gram negative rods,few gram positive cocci in clusters

## 2018-08-06 NOTE — PROGRESS NOTE ADULT - PROBLEM SELECTOR PLAN 1
Local care per podiatry  Continue Abx  Await bone bx  hold vancomycin today  check vancomycin level random in am keep level 15-20  and follow creat

## 2018-08-06 NOTE — PROVIDER CONTACT NOTE (CRITICAL VALUE NOTIFICATION) - SITUATION
tissue culture has tissue culture has moderate pseudomonous aeruginosa carbapenem resistant, mod mrsa, few E coli

## 2018-08-06 NOTE — PROGRESS NOTE ADULT - PROBLEM SELECTOR PLAN 3
-Chronic and stable  -Continue home medication of 25 mcg Levothyroxine PO daily -Chronic and stable  -Continue home medication of 25 mcg Levothyroxine

## 2018-08-06 NOTE — PROGRESS NOTE ADULT - SUBJECTIVE AND OBJECTIVE BOX
86yo female seen bedside during AM rounds for her bilateral heel decubitus wounds, who is status post left heel wound debridement and bone biopsy on 8/3/18.     T(C): 37 (08-06-18 @ 04:46), Max: 37.2 (08-05-18 @ 20:21)  HR: 70 (08-06-18 @ 04:46) (68 - 70)  BP: 136/61 (08-06-18 @ 04:46) (111/55 - 136/61)  RR: 17 (08-06-18 @ 04:46) (17 - 20)  SpO2: 95% (08-06-18 @ 04:46) (93% - 100%)                          9.6    8.93  )-----------( 344      ( 06 Aug 2018 09:08 )             30.0       08-06    144  |  104  |  19  ----------------------------<  86  3.8   |  30  |  1.70<H>    Ca    9.0      06 Aug 2018 09:06      Objective exam:   vasc: pedal pulses mildly palpable; CFT < 3 seconds x10; TG WNL; no edema noted  Derm:  -wound to lateral plantar left heel noted to measure approximately 6cm x 6cm x 0.2 without probe to bone with fibro-granular base (80:20) and mild serous drainage noted to the dressing; mild malodor present, no periwound erythema or hyperkeratosis noted  -wound to plantar medial right heel noted to measure approximately 3cm x 4cm x 0.1cm without probe to bone with fibro-granular base (30:70) without drainage noted to the dressing, no malodor noted, no periwound erythema or hyperkeratosis noted

## 2018-08-06 NOTE — PROVIDER CONTACT NOTE (OTHER) - SITUATION
positive tissue culture positive tissue culture. carbapenum resistant mod psedomonus aerugenosa, mod mrsa.

## 2018-08-06 NOTE — PROGRESS NOTE ADULT - ASSESSMENT
Pressure ulcers both heels, stage 3  Osteomyelitis L heel  For bone Bx monday  Hip mass- ?old hematoma.  Vanco level high

## 2018-08-06 NOTE — PROGRESS NOTE ADULT - ATTENDING COMMENTS
Patient seen and evaluated  Both wounds cleansed and dressed with acticoat flex-3, DSD at this time since no santyl was present bedside.  Ordered santyl, will continue daily local wound care with santyl, DSD bilaterally  Continue IV antibiotics and offloading boots at all times    Wound care instructions:  1. cleanse wounds with normal saline, pat dry  2. apply nickel-thickness amount of santyl to wounds  3. dress with dry, sterile dressing with materials such as 4x4 gauze, abd padding jeffery and ace  4. change daily and monitor for signs of infection such as redness, swelling, and purulent drainage  please have patient follow up with me in wound care clinic within 1 week of discharge

## 2018-08-06 NOTE — PROGRESS NOTE ADULT - ATTENDING COMMENTS
pt seen and examine see above - Patient is an 85 year old female multiple medical problems, admitted for cellulitis of both heels with stage 3 pressure ulcers and cellulitis, suspect left heel acute Osteomyelitis  s/p biopsy  by podiatry at bedside. F/U Pathology report  on iv abx zosyne  hold vanco sec to  high trough    tissue cult + pseudomonas and mrsa  , mario  hydration  iv fluid  - fu electrolyte  .

## 2018-08-06 NOTE — PROGRESS NOTE ADULT - SUBJECTIVE AND OBJECTIVE BOX
[facility], Division of Infectious Diseases  CHIDI Nava A. Lee  [phone]  Name: MIGUEL LORD  Age: 85y  Gender: Female  MRN: [id number]    Interval History--  Notes reviewed  nonverbal    Past Medical History--  Stroke  Hypothyroidism  Hyperlipidemia  Parkinson disease  Afib  Hypertension  Dementia  H/O total knee replacement, right      For details regarding the patient's social history, family history, and other miscellaneous elements, please refer the initial infectious diseases consultation and/or the admitting history and physical examination for this admission.    Allergies    No Known Allergies    Intolerances        Medications--  Antibiotics:  piperacillin/tazobactam IVPB. 3.375 Gram(s) IV Intermittent every 8 hours    Immunologic:    Other:  acetaminophen   Tablet PRN  acetaminophen   Tablet. PRN  ATENolol  Tablet  carbidopa/levodopa  25/100  collagenase Ointment  levothyroxine  mirtazapine  rasagiline Tablet  rivaroxaban  simvastatin      Review of Systems--  A 10-point review of systems was obtained.   unable to obtain  Review of systems otherwise negative except as previously noted.    Physical Examination--  Vital Signs: T(F): 98.6 (08-06-18 @ 04:46), Max: 98.9 (08-05-18 @ 20:21)  HR: 70 (08-06-18 @ 04:46)  BP: 136/61 (08-06-18 @ 04:46)  RR: 17 (08-06-18 @ 04:46)  SpO2: 95% (08-06-18 @ 04:46)  Wt(kg): --  General: Nontoxic-appearing Female in no acute distress.  HEENT: AT/NC.   Neck: Not rigid. No sense of mass.  Nodes: None palpable.  lungs noncompliant with exam  Heart: Regular rate and rhythm. No Murmur. No rub. No gallop. No palpable thrill.  Abdomen: Bowel sounds present and normoactive. Soft. Nondistended.   Extremities: No cyanosis or clubbing. No edema.   Skin: Warm. Dry. Good turgor. No rash. No vasculitic stigmata.  .         Laboratory Studies--  CBC                        9.6    8.93  )-----------( 344      ( 06 Aug 2018 09:08 )             30.0       Chemistries  08-06    144  |  104  |  19  ----------------------------<  86  3.8   |  30  |  1.70<H>    Ca    9.0      06 Aug 2018 09:06        Culture Data    Culture - Tissue with Gram Stain (collected 03 Aug 2018 16:46)  Source: .Tissue Other, left heel  Gram Stain (03 Aug 2018 22:46):    No polymorphonuclear cells seen    No organisms seen  Preliminary Report (05 Aug 2018 22:53):    Rare Enterobacter cloacae/asburiae    Growth in fluid media only Staphylococcus aureus    Culture - Tissue with Gram Stain (collected 03 Aug 2018 16:34)  Source: .Tissue Other, left heel  Gram Stain (03 Aug 2018 22:47):    No polymorphonuclear cells seen    Moderate Gram Negative Rods per oil power field  Preliminary Report (05 Aug 2018 20:52):    Moderate Pseudomonas aeruginosa (Carbapenem Resistant)    Moderate Methicillin resistant Staphylococcus aureus    Few Escherichia coli    Numerous Corynebacterium species    "Susceptibilities not performed"  Organism: Pseudomonas aeruginosa (Carbapenem Resistant)  Methicillin resistant Staphylococcus aureus (05 Aug 2018 20:43)  Organism: Pseudomonas aeruginosa (Carbapenem Resistant) (05 Aug 2018 20:43)  Organism: Methicillin resistant Staphylococcus aureus (05 Aug 2018 20:43)

## 2018-08-07 DIAGNOSIS — M86.9 OSTEOMYELITIS, UNSPECIFIED: ICD-10-CM

## 2018-08-07 LAB
ANION GAP SERPL CALC-SCNC: 9 MMOL/L — SIGNIFICANT CHANGE UP (ref 5–17)
BUN SERPL-MCNC: 22 MG/DL — SIGNIFICANT CHANGE UP (ref 7–23)
CALCIUM SERPL-MCNC: 8.6 MG/DL — SIGNIFICANT CHANGE UP (ref 8.5–10.1)
CHLORIDE SERPL-SCNC: 109 MMOL/L — HIGH (ref 96–108)
CO2 SERPL-SCNC: 28 MMOL/L — SIGNIFICANT CHANGE UP (ref 22–31)
CREAT SERPL-MCNC: 1.7 MG/DL — HIGH (ref 0.5–1.3)
GLUCOSE SERPL-MCNC: 88 MG/DL — SIGNIFICANT CHANGE UP (ref 70–99)
HCT VFR BLD CALC: 30 % — LOW (ref 34.5–45)
HGB BLD-MCNC: 9.7 G/DL — LOW (ref 11.5–15.5)
MCHC RBC-ENTMCNC: 29.1 PG — SIGNIFICANT CHANGE UP (ref 27–34)
MCHC RBC-ENTMCNC: 32.3 GM/DL — SIGNIFICANT CHANGE UP (ref 32–36)
MCV RBC AUTO: 90.1 FL — SIGNIFICANT CHANGE UP (ref 80–100)
NRBC # BLD: 0 /100 WBCS — SIGNIFICANT CHANGE UP (ref 0–0)
PLATELET # BLD AUTO: 356 K/UL — SIGNIFICANT CHANGE UP (ref 150–400)
POTASSIUM SERPL-MCNC: 3.9 MMOL/L — SIGNIFICANT CHANGE UP (ref 3.5–5.3)
POTASSIUM SERPL-SCNC: 3.9 MMOL/L — SIGNIFICANT CHANGE UP (ref 3.5–5.3)
RBC # BLD: 3.33 M/UL — LOW (ref 3.8–5.2)
RBC # FLD: 15.2 % — HIGH (ref 10.3–14.5)
SODIUM SERPL-SCNC: 146 MMOL/L — HIGH (ref 135–145)
SURGICAL PATHOLOGY FINAL REPORT - CH: SIGNIFICANT CHANGE UP
VANCOMYCIN TROUGH SERPL-MCNC: 29.3 UG/ML — CRITICAL HIGH (ref 10–20)
WBC # BLD: 8.47 K/UL — SIGNIFICANT CHANGE UP (ref 3.8–10.5)
WBC # FLD AUTO: 8.47 K/UL — SIGNIFICANT CHANGE UP (ref 3.8–10.5)

## 2018-08-07 PROCEDURE — 99233 SBSQ HOSP IP/OBS HIGH 50: CPT | Mod: GC

## 2018-08-07 RX ORDER — SODIUM CHLORIDE 9 MG/ML
1000 INJECTION, SOLUTION INTRAVENOUS
Refills: 0 | Status: DISCONTINUED | OUTPATIENT
Start: 2018-08-07 | End: 2018-08-10

## 2018-08-07 RX ADMIN — PIPERACILLIN AND TAZOBACTAM 25 GRAM(S): 4; .5 INJECTION, POWDER, LYOPHILIZED, FOR SOLUTION INTRAVENOUS at 22:01

## 2018-08-07 RX ADMIN — PIPERACILLIN AND TAZOBACTAM 25 GRAM(S): 4; .5 INJECTION, POWDER, LYOPHILIZED, FOR SOLUTION INTRAVENOUS at 13:34

## 2018-08-07 RX ADMIN — RASAGILINE 0.5 MILLIGRAM(S): 0.5 TABLET ORAL at 12:50

## 2018-08-07 RX ADMIN — CARBIDOPA AND LEVODOPA 1 TABLET(S): 25; 100 TABLET ORAL at 13:34

## 2018-08-07 RX ADMIN — MIRTAZAPINE 7.5 MILLIGRAM(S): 45 TABLET, ORALLY DISINTEGRATING ORAL at 12:50

## 2018-08-07 RX ADMIN — ATENOLOL 25 MILLIGRAM(S): 25 TABLET ORAL at 05:01

## 2018-08-07 RX ADMIN — RIVAROXABAN 15 MILLIGRAM(S): KIT at 18:53

## 2018-08-07 RX ADMIN — SIMVASTATIN 10 MILLIGRAM(S): 20 TABLET, FILM COATED ORAL at 22:01

## 2018-08-07 RX ADMIN — CARBIDOPA AND LEVODOPA 1 TABLET(S): 25; 100 TABLET ORAL at 05:01

## 2018-08-07 RX ADMIN — Medication 1 APPLICATION(S): at 18:53

## 2018-08-07 RX ADMIN — Medication 25 MICROGRAM(S): at 05:01

## 2018-08-07 RX ADMIN — CARBIDOPA AND LEVODOPA 1 TABLET(S): 25; 100 TABLET ORAL at 22:00

## 2018-08-07 RX ADMIN — PIPERACILLIN AND TAZOBACTAM 25 GRAM(S): 4; .5 INJECTION, POWDER, LYOPHILIZED, FOR SOLUTION INTRAVENOUS at 05:00

## 2018-08-07 NOTE — CONSULT NOTE ADULT - ASSESSMENT
85 female with a history of Dementia, HTN, CAD, AF, HLD, Parkinson's disease now sepsis and was started on IV Vancomycin. Levels are supra-therapeutic with low albumin levels. Agree to hold the Vancomycin for now. Agree with IVF but change to Half NS at 100 cc an hour. Will obtain a renal sonogram to assess the kidney size and rule out obstruction. will follow closely.
Pressure ulcers both heels, stage 3  Role of antibiotics unclear based on notes in chart  If osteomyelitis is indeed present, then given duration of injury it has a high probability of being chronic in nature. Chronic osteomyelitis by definition requires surgical debridement to cure. Given the history the only way to tell whether the OM would be acute or chronic would be bone biopsy.    Hip mass- ?old hematoma.    Suggestions--  Role of Abx TBD.  MRI  Further W/U TBD  Unfortunately now that antibiotics have been given, yield on cx from bone bx further diminished (though pathology still useful).  W/U of hip mass per primary team    D/W Dr. Salvador    Thank you for the courtesy of this referral.    Aryan Cheney MD  [phone]
Stage 3 pressure ulcer both heels.

## 2018-08-07 NOTE — PROGRESS NOTE ADULT - ATTENDING COMMENTS
pt seen and examine see above - Patient is an 85 year old female multiple medical problems bed  to wheel chair bound , admitted for cellulitis of both heels with stage 3 pressure ulcers and cellulitis, suspect left heel acute Osteomyelitis  s/p biopsy  by podiatry at bedside. F/U Pathology report  on iv abx zosyne  hold vanco sec to  high trough    tissue cult + pseudomonas and mrsa  , mario  vanco hold sec to high trough  hydration  iv fluid  - fu electrolyte  , hypernatremia  sec to dehydration  sec to iv fluid  . family aware bed side plan will make decision after path report.

## 2018-08-07 NOTE — PROGRESS NOTE ADULT - SUBJECTIVE AND OBJECTIVE BOX
Patient is a 85y old  Female who presents with a chief complaint of heel wounds (31 Jul 2018 14:16)      HPI:  85F with PMH HTN, a fib on Xarelto, Parkinson's Dx, Hx of CVA, bedbound presents from [facility] for evaluation of bilateral heel wounds. Patient A&O1-2 and doesn't answer questions appropriately, history obtained from family at bedside. Patient reports left sided ankle pain. Per family, patient had bilateral wounds for 7-9 months, being treated by wound care center in [place]. Visiting nurse assessed wounds today and stated R heel looked worse compared to last week and sent patient to the ED. Visiting nurse changed dressing on bilateral feet wounds. Family also requesting patient to become DNR and discuss advance directives.     In the ED: temp 98.3, HR 68, BP 98/62, RR 18, SpO2 91% RA. ESR 80, BUN/Cr: 20/1.2. Chest xray: no active disease. Xray right foot: No acute fracture, dislocation, or destructive lesions evident. Xray left foot: No acute fracture, dislocation, or destructive bone lesions evident. Soft tissue ulcer may be present inferior to the left calcaneus visible on the oblique view. Received IV Vancomycin. Dr. Álvarez, Wound Care, evaluated patient in the ED. (31 Jul 2018 14:16)      INTERVAL HPI/OVERNIGHT EVENTS:  No acute events overnight.     T(C): 36.6 (08-07-18 @ 04:36), Max: 37.2 (08-06-18 @ 21:04)  HR: 67 (08-07-18 @ 04:36) (66 - 68)  BP: 145/70 (08-07-18 @ 04:36) (106/67 - 145/70)  RR: 20 (08-07-18 @ 04:36) (17 - 20)  SpO2: 98% (08-07-18 @ 04:36) (94% - 100%)  I&O's Summary    06 Aug 2018 07:01  -  07 Aug 2018 07:00  --------------------------------------------------------  IN: 2125 mL / OUT: 0 mL / NET: 2125 mL        REVIEW OF SYSTEMS:  Unable to assess due to patient's baseline mental status.     PHYSICAL EXAM:  GENERAL: NAD, well-developed  HEAD:  Atraumatic, Normocephalic  EYES: EOMI, PERRLA, conjunctiva and sclera clear  NECK: Supple, No JVD, Normal thyroid  CHEST/LUNG: Clear to percussion bilaterally; No rales, rhonchi, wheezing, or rubs  HEART: Regular rate and rhythm; No murmurs, rubs, or gallops  ABDOMEN: Soft, Nontender, Nondistended; Bowel sounds present  EXTREMITIES:  2+ Peripheral Pulses, No clubbing, cyanosis, or edema  LYMPH: No lymphadenopathy noted  SKIN: No rashes or lesions    MEDICATIONS  (STANDING):  ATENolol  Tablet 25 milliGRAM(s) Oral daily  carbidopa/levodopa  25/100 1 Tablet(s) Oral three times a day  collagenase Ointment 1 Application(s) Topical daily  levothyroxine 25 MICROGram(s) Oral daily  mirtazapine 7.5 milliGRAM(s) Oral daily  piperacillin/tazobactam IVPB. 3.375 Gram(s) IV Intermittent every 8 hours  rasagiline Tablet 0.5 milliGRAM(s) Oral daily  rivaroxaban 15 milliGRAM(s) Oral every 24 hours  simvastatin 10 milliGRAM(s) Oral at bedtime  sodium chloride 0.9%. 1000 milliLiter(s) (75 mL/Hr) IV Continuous <Continuous>    MEDICATIONS  (PRN):  acetaminophen   Tablet 650 milliGRAM(s) Oral every 6 hours PRN For Temp greater than 38 C (100.4 F)  acetaminophen   Tablet. 650 milliGRAM(s) Oral every 6 hours PRN Moderate Pain (4 - 6)      LABS:                        9.7    8.47  )-----------( 356      ( 07 Aug 2018 06:40 )             30.0     08-07    146<H>  |  109<H>  |  22  ----------------------------<  88  3.9   |  28  |  1.70<H>    Ca    8.6      07 Aug 2018 06:40          CAPILLARY BLOOD GLUCOSE          08-03 @ 16:46   Rare Enterobacter cloacae/asburiae  Growth in fluid media only Methicillin resistant Staphylococcus aureus  Rare Corynebacterium species  --  Enterobacter cloacae/absuria  08-03 @ 16:34   Moderate Pseudomonas aeruginosa (Carbapenem Resistant)  Moderate Methicillin resistant Staphylococcus aureus  Few Escherichia coli  Numerous Corynebacterium species  "Susceptibilities not performed"  --  Methicillin resistant Staphylococcus aureus          RADIOLOGY & ADDITIONAL TESTS:    Imaging Personally Reviewed:       Advance Directives:      Palliative Care: Patient is a 85y old  Female who presents with a chief complaint of heel wounds (31 Jul 2018 14:16)      HPI:  85F with PMH HTN, a fib on Xarelto, Parkinson's Dx, Hx of CVA, bedbound presents from [facility] for evaluation of bilateral heel wounds. Patient A&O1-2 and doesn't answer questions appropriately, history obtained from family at bedside. Patient reports left sided ankle pain. Per family, patient had bilateral wounds for 7-9 months, being treated by wound care center in [place]. Visiting nurse assessed wounds today and stated R heel looked worse compared to last week and sent patient to the ED. Visiting nurse changed dressing on bilateral feet wounds. Family also requesting patient to become DNR and discuss advance directives.       admitted for cellulitis of both heels with stage 3 pressure ulcers and cellulitis, suspect left heel acute Osteomyelitis  s/p biopsy  by podiatry at bedside.         INTERVAL HPI/OVERNIGHT EVENTS:  No acute events overnight pt alert awake but confuse .     T(C): 36.6 (08-07-18 @ 04:36), Max: 37.2 (08-06-18 @ 21:04)  HR: 67 (08-07-18 @ 04:36) (66 - 68)  BP: 145/70 (08-07-18 @ 04:36) (106/67 - 145/70)  RR: 20 (08-07-18 @ 04:36) (17 - 20)  SpO2: 98% (08-07-18 @ 04:36) (94% - 100%)  I&O's Summary    06 Aug 2018 07:01  -  07 Aug 2018 07:00  --------------------------------------------------------  IN: 2125 mL / OUT: 0 mL / NET: 2125 mL        REVIEW OF SYSTEMS:  Unable to assess due to patient's baseline mental status.     PHYSICAL EXAM:  GENERAL: NAD, well-developed  HEAD:  Atraumatic, Normocephalic  EYES: EOMI, PERRLA, conjunctiva and sclera clear  NECK: Supple, No JVD,  CHEST/LUNG: Clear to percussion bilaterally; No rales, rhonchi, wheezing,   HEART: Regular rate and rhythm; No murmurs, no tachy   ABDOMEN: Soft, Nontender, Nondistended; Bowel sounds present  EXTREMITIES:  2+ Peripheral Pulses, No clubbing, cyanosis, or edema  SKIN: No rashes or lesion/lt heel wound wrapped     MEDICATIONS  (STANDING):  ATENolol  Tablet 25 milliGRAM(s) Oral daily  carbidopa/levodopa  25/100 1 Tablet(s) Oral three times a day  collagenase Ointment 1 Application(s) Topical daily  levothyroxine 25 MICROGram(s) Oral daily  mirtazapine 7.5 milliGRAM(s) Oral daily  piperacillin/tazobactam IVPB. 3.375 Gram(s) IV Intermittent every 8 hours  rasagiline Tablet 0.5 milliGRAM(s) Oral daily  rivaroxaban 15 milliGRAM(s) Oral every 24 hours  simvastatin 10 milliGRAM(s) Oral at bedtime  sodium chloride 0.9%. 1000 milliLiter(s) (75 mL/Hr) IV Continuous <Continuous>    MEDICATIONS  (PRN):  acetaminophen   Tablet 650 milliGRAM(s) Oral every 6 hours PRN For Temp greater than 38 C (100.4 F)  acetaminophen   Tablet. 650 milliGRAM(s) Oral every 6 hours PRN Moderate Pain (4 - 6)      LABS:                        9.7    8.47  )-----------( 356      ( 07 Aug 2018 06:40 )             30.0     08-07    146<H>  |  109<H>  |  22  ----------------------------<  88  3.9   |  28  |  1.70<H>    Ca    8.6      07 Aug 2018 06:40          CAPILLARY BLOOD GLUCOSE          08-03 @ 16:46   Rare Enterobacter cloacae/asburiae  Growth in fluid media only Methicillin resistant Staphylococcus aureus  Rare Corynebacterium species  --  Enterobacter cloacae/absuria  08-03 @ 16:34   Moderate Pseudomonas aeruginosa (Carbapenem Resistant)  Moderate Methicillin resistant Staphylococcus aureus  Few Escherichia coli  Numerous Corynebacterium species  "Susceptibilities not performed"  --  Methicillin resistant Staphylococcus aureus          RADIOLOGY & ADDITIONAL TESTS:    Imaging Personally Reviewed:     no new test   Advance Directives:    dnr/dni   Palliative Care:  appropriate

## 2018-08-07 NOTE — CONSULT NOTE ADULT - SUBJECTIVE AND OBJECTIVE BOX
Chief Complaint: Bilateral heel pressure ulcers.     HPI: Few weeks history of bilateral heel ulcers which has gotten worst.     PAST MEDICAL & SURGICAL HISTORY:  Stroke  Hypothyroidism  Hyperlipidemia  Parkinson disease  Afib  Hypertension  Dementia  H/O total knee replacement, right      Allergies    No Known Allergies    Intolerances        MEDICATIONS  (STANDING):    MEDICATIONS  (PRN):      FAMILY HISTORY:          ROS:  CONSTITUTIONAL: No fever, weight loss, or fatigue  EYES: No eye pain, visual disturbances, or discharge  ENMT:  No difficulty hearing, tinnitus, vertigo; No sinus or throat pain  NECK: No pain or stiffness  BREASTS: No pain, masses, or nipple discharge  RESPIRATORY: No cough, wheezing, chills or hemoptysis; No shortness of breath  CARDIOVASCULAR: No chest pain, palpitations, dizziness, or leg swelling  GASTROINTESTINAL: No abdominal or epigastric pain. No nausea, vomiting, or hematemesis; No diarrhea or constipation. No melena or hematochezia.  GENITOURINARY: No dysuria, frequency, hematuria, or incontinence  NEUROLOGICAL: No headaches, memory loss, loss of strength, numbness, or tremors  SKIN: No itching, burning, rashes, or lesions   LYMPH NODES: No enlarged glands  ENDOCRINE: No heat or cold intolerance; No hair loss  MUSCULOSKELETAL: No joint pain or swelling; No muscle, back, or extremity pain  PSYCHIATRIC: No depression, anxiety, mood swings, or difficulty sleeping  HEME/LYMPH: No easy bruising, or bleeding gums  ALLERGY AND IMMUNOLOGIC: No hives or eczema    PHYSICAL EXAM-    Height (cm): 154.94 (07-31 @ 12:34)  Weight (kg): 87.1 (07-31 @ 12:34)  BMI (kg/m2): 36.3 (07-31 @ 12:34)  Vital Signs Last 24 Hrs  T(C): 36.8 (31 Jul 2018 11:20), Max: 36.8 (31 Jul 2018 11:20)  T(F): 98.3 (31 Jul 2018 11:20), Max: 98.3 (31 Jul 2018 11:20)  HR: 68 (31 Jul 2018 11:20) (68 - 68)  BP: 98/62 (31 Jul 2018 11:20) (98/62 - 98/62)  BP(mean): --  RR: 18 (31 Jul 2018 11:20) (18 - 18)  SpO2: 91% (31 Jul 2018 11:20) (91% - 91%)    Constitutional: well developed, well nourished, no apparent distress, lethargic, non-verbal.   Neck: Supple   Pulmonary: no respiratory distress, normal respiratory rhythm and effort, lungs are clear to auscultation/percussion. No CVA tenderness.  Cardiovascular: heart rate normal, normal sinus rhythm; no murmurs, gallops, rubs, heaves or thrills   Abdomen: soft, non-tender, +BS, no guarding/rebound/rigidity.  Vascular: Lower extremities are well perfused.   Extremities: Mild bilateral edema, in fetal position.   Skin: Bilateral stage 3 pressure ulcers with necrotic tissue, no cellulitis.                             11.8   9.84  )-----------( 414      ( 31 Jul 2018 12:13 )             36.2             Radiology:
[facility], Division of Infectious Diseases  CHIDI Nava A. Ronal    [place], MIGUEL  85y, Female  [id number]    HPI--  History from chart, patient a non-historian. Patient sent to hospital from facility with worsening chronic bilateral heel wounds. Seen here by podiatry and wound care. Being chemically debrided. Plain X-rays without support of osteomyelitis. MRI planned.    PMH/PSH--  Stroke  Hypothyroidism  Hyperlipidemia  Parkinson disease  Afib  Hypertension  Dementia  H/O total knee replacement, right      Allergies-- NKDA      Medications--  Antibiotics: piperacillin/tazobactam IVPB. 3.375 Gram(s) IV Intermittent every 8 hours  vancomycin  IVPB 1000 milliGRAM(s) IV Intermittent every 12 hours    Immunologic:   Other: acetaminophen   Tablet PRN  acetaminophen   Tablet. PRN  ATENolol  Tablet  carbidopa/levodopa  25/100  collagenase Ointment  levothyroxine  mirtazapine  rasagiline Tablet  rivaroxaban  simvastatin      Social History--  EtOH: unknown  Tobacco: unknown  Drug Use: unknown    Family/Marital History--  Unknown      Review of Systems:  Review of systems unable due to dementia.     Physical Exam--  Vital Signs: T(F): 98 (08-01-18 @ 13:11), Max: 98.6 (08-01-18 @ 05:33)  HR: 96 (08-01-18 @ 13:11)  BP: 106/70 (08-01-18 @ 13:11)  RR: 18 (08-01-18 @ 13:11)  SpO2: 99% (08-01-18 @ 13:11)  Wt(kg): --  General: Nontoxic-appearing Female in no acute distress. Babbling intermittently. Does not respond appropriately to questions/commands.  HEENT: AT/NC. Pupils/EOM unable secondary to patient compliance. Oropharynx/dentition unable secondary to patient compliance.   Neck: Increased tone, not frankly rigid  Nodes: None palpable.  Lungs: Poor effor grossly clear bilaterally without rales, wheezing or rhonchi  Heart: Grossly regular rate and rhythm, poor compliance with exam.  Abdomen: Bowel sounds present and normoactive. Soft. Nondistended. Nontender. No sense of mass. No organomegaly.  Back: No spinal tenderness. No costovertebral angle tenderness.   Extremities: No cyanosis or clubbing. 1+ LE edema. R ?TKR incision well healed. Mass R hip w al-sized eschar overlying, asymmetric with L side. No tenderness elicited. No inflammatory skin changes.  Skin: Warm. Dry. Good turgor. No rash. No vasculitic stigmata.  Psychiatric: Unable      Laboratory & Imaging Data--  CBC                        10.6   13.02 )-----------( 400      ( 01 Aug 2018 07:05 )             32.0     WBC Count: 9.84 K/uL (07.31.18 @ 12:13)      Chemistries  08-01    143  |  106  |  22  ----------------------------<  92  4.0   |  29  |  1.20    Ca    8.6      01 Aug 2018 07:05    TPro  7.8  /  Alb  2.9<L>  /  TBili  0.7  /  DBili  x   /  AST  19  /  ALT  14  /  AlkPhos  145<H>  07-31      Culture Data  None as of yet
Nephrology Consultation: Anthony Gramajo MD     LORD, MIGUEL  85y    HPI:  85F with PMH HTN, a fib on Xarelto, Parkinson's Dx, Hx of CVA, bedbound presents from [facility] for evaluation of bilateral heel wounds. There is no prior history of renal disease. She was started on Vancomycin and now has worsening renal indices. Her vancomycin levels are high and now is on hold. She is started on Zosyn at this time. No evidence of IV contrast or NSAIDS.       PAST MEDICAL & SURGICAL HISTORY:  Stroke  Hypothyroidism  Hyperlipidemia  Parkinson disease  Afib  Hypertension  Dementia  H/O total knee replacement, right      Allergies    No Known Allergies    Intolerances    Home Medications:  atenolol 25 mg oral tablet:  (31 Jul 2018 14:56)  Azilect 0.5 mg oral tablet:  (31 Jul 2018 14:56)  carbidopa-levodopa 25 mg-100 mg oral tablet: 1 tab(s) orally 3 times a day (31 Jul 2018 14:56)  levothyroxine 25 mcg (0.025 mg) oral tablet: 1 tab(s) orally once a day (31 Jul 2018 14:56)  mirtazapine 7.5 mg oral tablet:  (31 Jul 2018 14:56)  simvastatin 10 mg oral tablet:  (31 Jul 2018 14:56)  Xarelto 15 mg oral tablet:  (31 Jul 2018 14:56)    FAMILY HISTORY:      SOCIAL HISTORY:    REVIEW OF SYSTEMS:    Constitutional: No fever, weight loss or fatigue  Eyes: No eye pain, visual disturbances, or discharge  ENT:  No difficulty hearing, tinnitus, vertigo; No sinus or throat pain  Neck: No pain or stiffness  Breasts: No pain, masses or nipple discharge  Respiratory: No cough, wheezing, chills or hemoptysis  Cardiovascular: No chest pain, palpitations, shortness of breath, dizziness or leg swelling  Gastrointestinal: No abdominal or epigastric pain. No nausea, vomiting or hematemesis; No diarrhea or constipation. No melena or hematochezia.  Genitourinary: No dysuria, frequency, hematuria or incontinence  Rectal: No pain, hemorrhoids or incontinence  Neurological: No headaches, memory loss, loss of strength, numbness or tremors  Skin: No itching, burning, rashes or lesions   Lymph Nodes: No enlarged glands  Endocrine: No heat or cold intolerance; No hair loss  Musculoskeletal: No joint pain or swelling; No muscle, back or extremity pain  Psychiatric: No depression, anxiety, mood swings or difficulty sleeping  Heme/Lymph: No easy bruising or bleeding gums  Allergy and Immunologic: No hives or eczema    acetaminophen   Tablet 650 milliGRAM(s) Oral every 6 hours PRN  acetaminophen   Tablet. 650 milliGRAM(s) Oral every 6 hours PRN  ATENolol  Tablet 25 milliGRAM(s) Oral daily  carbidopa/levodopa  25/100 1 Tablet(s) Oral three times a day  collagenase Ointment 1 Application(s) Topical daily  levothyroxine 25 MICROGram(s) Oral daily  mirtazapine 7.5 milliGRAM(s) Oral daily  piperacillin/tazobactam IVPB. 3.375 Gram(s) IV Intermittent every 8 hours  rasagiline Tablet 0.5 milliGRAM(s) Oral daily  rivaroxaban 15 milliGRAM(s) Oral every 24 hours  simvastatin 10 milliGRAM(s) Oral at bedtime  sodium chloride 0.9%. 1000 milliLiter(s) IV Continuous <Continuous>      Vital Signs Last 24 Hrs  T(C): 36.4 (07 Aug 2018 13:40), Max: 37.2 (06 Aug 2018 21:04)  T(F): 97.6 (07 Aug 2018 13:40), Max: 98.9 (06 Aug 2018 21:04)  HR: 62 (07 Aug 2018 13:40) (62 - 68)  BP: 131/69 (07 Aug 2018 13:40) (106/67 - 145/70)  BP(mean): --  RR: 18 (07 Aug 2018 13:40) (17 - 20)  SpO2: 97% (07 Aug 2018 13:40) (94% - 100%)    PHYSICAL EXAM:    Constitutional: NAD, well-groomed, well-developed  HEENT: PERRLA, EOMI, Normal Hearing, MMM  Neck: No LAD, No JVD  Back: Normal spine flexure, No CVA tenderness  Respiratory: CTAB/L   Cardiovascular: S1 and S2, RRR, no M/G/R  Gastrointestinal: BS+, soft, NT/ND  Extremities: No peripheral edema  Vascular: 2+ peripheral pulses  Neurological: Awake and alert.       LABS:                        9.7    8.47  )-----------( 356      ( 07 Aug 2018 06:40 )             30.0     08-07    146<H>  |  109<H>  |  22  ----------------------------<  88  3.9   |  28  |  1.70<H>    Ca    8.6      07 Aug 2018 06:40      MICROBIOLOGY:  RECENT CULTURES:  08-03 .Tissue Other, left heel Enterobacter cloacae/absuria  Methicillin resistant Staphylococcus aureus   No polymorphonuclear cells seen  No organisms seen   Rare Enterobacter cloacae/asburiae  Growth in fluid media only Methicillin resistant Staphylococcus aureus  Rare Corynebacterium species    08-03 .Tissue Other, left heel Pseudomonas aeruginosa (Carbapenem Resistant)  Methicillin resistant Staphylococcus aureus  Escherichia coli   Upon re-evaluation of gram stain:  No polymorphonuclear cells seen  Moderate Gram Negative Rods per oil power field  Few Gram Positive Cocci in Clusters per oil power field   Moderate Pseudomonas aeruginosa (Carbapenem Resistant)  Moderate Methicillin resistant Staphylococcus aureus  Few Escherichia coli  Numerous Corynebacterium species  "Susceptibilities not performed"          RADIOLOGY & ADDITIONAL STUDIES:

## 2018-08-07 NOTE — PROGRESS NOTE ADULT - SUBJECTIVE AND OBJECTIVE BOX
[facility], Division of Infectious Diseases  CHIDI Nava A. Lee  [phone]    Name: MIGUEL LORD  Age: 85y  Gender: Female  MRN: [id number]    Interval History--  Notes reviewed. No complaints, but remain unconvinced that she is reliable.   Family at bedside, discussed with them.  Vanco level done yesterday after dose, therefore not a true trough  Increased creatinine, antibiotic held, repeat level still high.  Reviewed with housestaff in detail earlier.    Past Medical History--  Stroke  Hypothyroidism  Hyperlipidemia  Parkinson disease  Afib  Hypertension  Dementia  H/O total knee replacement, right      For details regarding the patient's social history, family history, and other miscellaneous elements, please refer the initial infectious diseases consultation and/or the admitting history and physical examination for this admission.    Allergies    No Known Allergies    Intolerances        Medications--  Antibiotics:  piperacillin/tazobactam IVPB. 3.375 Gram(s) IV Intermittent every 8 hours  Vanco held    Immunologic:    Other:  acetaminophen   Tablet PRN  acetaminophen   Tablet. PRN  ATENolol  Tablet  carbidopa/levodopa  25/100  collagenase Ointment  levothyroxine  mirtazapine  rasagiline Tablet  rivaroxaban  simvastatin  sodium chloride 0.45%.      Review of Systems--  Review of systems unable due to dementia.     Physical Examination--  Vital Signs: T(F): 97.6 (08-07-18 @ 13:40), Max: 98.9 (08-06-18 @ 21:04)  HR: 62 (08-07-18 @ 13:40)  BP: 131/69 (08-07-18 @ 13:40)  RR: 18 (08-07-18 @ 13:40)  SpO2: 97% (08-07-18 @ 13:40)  Wt(kg): --  General: Nontoxic-appearing Female in no acute distress.   HEENT: AT/NC. Anicteric. Conj pink/moist. Ana w dentures. Parkinsonian.  Neck: Increased tone, not frankly rigid  Nodes: None palpable.  Lungs: Poor effort, no rales, wheezing or rhonchi  Heart: Regular rate and rhythm, poor compliance with exam.  Abdomen: Bowel sounds present and normoactive. Soft. Nondistended. Nontender. No sense of mass. No organomegaly.  Extremities: No cyanosis or clubbing. Trace LE edema. Feet dressed.  Skin: Warm. Dry. Good turgor. No rash. No vasculitic stigmata.  Psychiatric: Pleasant.       Laboratory Studies--  CBC                        9.7    8.47  )-----------( 356      ( 07 Aug 2018 06:40 )             30.0       Chemistries  08-07    146<H>  |  109<H>  |  22  ----------------------------<  88  3.9   |  28  |  1.70<H>    Ca    8.6      07 Aug 2018 06:40        Culture Data    Culture - Tissue with Gram Stain (collected 03 Aug 2018 16:46)  Source: .Tissue Other, left heel  Gram Stain (03 Aug 2018 22:46):    No polymorphonuclear cells seen    No organisms seen  Preliminary Report (06 Aug 2018 20:09):    Rare Enterobacter cloacae/asburiae    Growth in fluid media only Methicillin resistant Staphylococcus aureus    Rare Corynebacterium species  Organism: Enterobacter cloacae/absuria  Methicillin resistant Staphylococcus aureus (06 Aug 2018 20:04)  Organism: Methicillin resistant Staphylococcus aureus (06 Aug 2018 20:04)  Organism: Enterobacter cloacae/absuria (06 Aug 2018 20:03)    Culture - Tissue with Gram Stain (collected 03 Aug 2018 16:34)  Source: .Tissue Other, left heel  Gram Stain (06 Aug 2018 20:52):    Upon re-evaluation of gram stain:    No polymorphonuclear cells seen    Moderate Gram Negative Rods per oil power field    Few Gram Positive Cocci in Clusters per oil power field  Preliminary Report (05 Aug 2018 20:52):    Moderate Pseudomonas aeruginosa (Carbapenem Resistant)    Moderate Methicillin resistant Staphylococcus aureus    Few Escherichia coli    Numerous Corynebacterium species    "Susceptibilities not performed"  Organism: Pseudomonas aeruginosa (Carbapenem Resistant)  Methicillin resistant Staphylococcus aureus  Escherichia coli (06 Aug 2018 18:50)  Organism: Escherichia coli (06 Aug 2018 18:50)  Organism: Pseudomonas aeruginosa (Carbapenem Resistant) (05 Aug 2018 20:43)  Organism: Methicillin resistant Staphylococcus aureus (05 Aug 2018 20:43)

## 2018-08-07 NOTE — PROGRESS NOTE ADULT - ASSESSMENT
Pressure ulcers both heels, stage 3  Osteomyelitis L heel  Path acute osteomyelitis  Cx polymicrobial incl MRSA and P. aeruginosa R meropenem  Discussed with family at bedside. Favor Iv treatment here. Different treatment pathways addressed, e.g. PO vs. IV, and potential AE related to PICC/OPD antibiotics addressed in layman's terms. All questions answered to the best of my ability    Now w OLRENA. Suspect vancomycin level is elevated as a consequence of LORENA rather than a cause  If LORENA worsens will need to consider adjusting Zosyn too.

## 2018-08-07 NOTE — PROGRESS NOTE ADULT - PROBLEM SELECTOR PLAN 1
-MRI of R foot showed no osteomyelitis, but MRI of left foot has  possible osteomyelitis  -Results pending for L bone Bx done 8/3 and tissue culture showed MRSA    -Podiatry Dr. Vargas, performed bone biopsy at bedside   -Continue IV Vancomycin 1000 mg Q12  dc as high trough   -Continue to follow Vanc trough (16.4 8/1/18), monitor renal function   -Continue 3.375 Zosyn mg IV Q8H   -Continue collagenese ointment in bilateral heels  -Continue off-loading of heels bilaterally  -Dr. Álvarez, wound care recommendations appreciated. Continue Collagenase, protective dressing  -D/C vanc as trough level elevated (37)  -F/U ID -MRI of R foot showed no osteomyelitis, but MRI of left foot has  possible osteomyelitis  -Results pending for L bone Bx done 8/3 and tissue culture showed MRSA  -Hold Vanc because last trough 29.3 (8/7/18 6:40 am)    -Podiatry Dr. Vargas, performed bone biopsy at bedside   -Continue IV Vancomycin 1000 mg Q12  dc as high trough   -Continue to follow Vanc trough (16.4 8/1/18), monitor renal function   -Continue 3.375 Zosyn mg IV Q8H   -Continue collagenese ointment in bilateral heels  -Continue off-loading of heels bilaterally  -Dr. Álvarez, wound care recommendations appreciated. Continue Collagenase, protective dressing  -D/C vanc as trough level elevated (37)  -F/U ID

## 2018-08-07 NOTE — CHART NOTE - NSCHARTNOTEFT_GEN_A_CORE
Assessment: 86 y/o female adm with pressure ulcers of foot. Pt with chronic malnutrition. Pt noted to be confused. Spoke with family. Family states that pt's po intake has improved. Pt eating well. Assistance/total feed provided at meal times.     Factors impacting intake: [x ] none [ ] nausea  [ ] vomiting [ ] diarrhea [ ] constipation  [ ]chewing problems [ ] swallowing issues  [ ] other:     Diet Presciption: Diet, DASH/TLC:   Sodium & Cholesterol Restricted (07-31-18 @ 13:23)    Intake: 25-85% noted    Current Weight: Weight (kg): 75.1 (07-31 @ 16:30)  % Weight Change    Pertinent Medications: MEDICATIONS  (STANDING):  ATENolol  Tablet 25 milliGRAM(s) Oral daily  carbidopa/levodopa  25/100 1 Tablet(s) Oral three times a day  collagenase Ointment 1 Application(s) Topical daily  levothyroxine 25 MICROGram(s) Oral daily  mirtazapine 7.5 milliGRAM(s) Oral daily  piperacillin/tazobactam IVPB. 3.375 Gram(s) IV Intermittent every 8 hours  rasagiline Tablet 0.5 milliGRAM(s) Oral daily  rivaroxaban 15 milliGRAM(s) Oral every 24 hours  simvastatin 10 milliGRAM(s) Oral at bedtime  sodium chloride 0.9%. 1000 milliLiter(s) (75 mL/Hr) IV Continuous <Continuous>    MEDICATIONS  (PRN):  acetaminophen   Tablet 650 milliGRAM(s) Oral every 6 hours PRN For Temp greater than 38 C (100.4 F)  acetaminophen   Tablet. 650 milliGRAM(s) Oral every 6 hours PRN Moderate Pain (4 - 6)    Pertinent Labs: 08-07 Na146 mmol/L<H> Glu 88 mg/dL K+ 3.9 mmol/L Cr  1.70 mg/dL<H> BUN 22 mg/dL 08-02 Alb 2.5 g/dL<L>     CAPILLARY BLOOD GLUCOSE        Skin: Sacrum II, right heel unstag, left heel unstag, right ankle unstag, left buttocks unstag, right lateral foot DTI, right dorsal heel DTI.     Estimated Needs:   [ x] no change since previous assessment  [ ] recalculated:     Previous Nutrition Diagnosis:   [ ] Inadequate Energy Intake [ ]Inadequate Oral Intake [ ] Excessive Energy Intake   [ ] Underweight [ ] Increased Nutrient Needs [ ] Overweight/Obesity   [ ] Altered GI Function [ ] Unintended Weight Loss [ ] Food & Nutrition Related Knowledge Deficit [x ] Malnutrition     Nutrition Diagnosis is [ x] ongoing  [ ] resolved [ ] not applicable     New Nutrition Diagnosis: [ x] not applicable       Interventions:   Recommend  [ ] Change Diet To:  [ ] Nutrition Supplement  [ ] Nutrition Support  [ x] Other: Rx MVI daily, vit C 500 mg BID, Skinny BID, Ensure BID.     Monitoring and Evaluation:   [ x] PO intake [ x ] Tolerance to diet prescription [ x ] weights [ x ] labs[ x ] follow up per protocol  [ ] other:

## 2018-08-08 LAB
ANION GAP SERPL CALC-SCNC: 7 MMOL/L — SIGNIFICANT CHANGE UP (ref 5–17)
BUN SERPL-MCNC: 17 MG/DL — SIGNIFICANT CHANGE UP (ref 7–23)
CALCIUM SERPL-MCNC: 8.8 MG/DL — SIGNIFICANT CHANGE UP (ref 8.5–10.1)
CHLORIDE SERPL-SCNC: 107 MMOL/L — SIGNIFICANT CHANGE UP (ref 96–108)
CO2 SERPL-SCNC: 30 MMOL/L — SIGNIFICANT CHANGE UP (ref 22–31)
CREAT SERPL-MCNC: 1.4 MG/DL — HIGH (ref 0.5–1.3)
CULTURE RESULTS: SIGNIFICANT CHANGE UP
CULTURE RESULTS: SIGNIFICANT CHANGE UP
GLUCOSE SERPL-MCNC: 84 MG/DL — SIGNIFICANT CHANGE UP (ref 70–99)
HCT VFR BLD CALC: 29.1 % — LOW (ref 34.5–45)
HGB BLD-MCNC: 9.6 G/DL — LOW (ref 11.5–15.5)
MCHC RBC-ENTMCNC: 29.9 PG — SIGNIFICANT CHANGE UP (ref 27–34)
MCHC RBC-ENTMCNC: 33 GM/DL — SIGNIFICANT CHANGE UP (ref 32–36)
MCV RBC AUTO: 90.7 FL — SIGNIFICANT CHANGE UP (ref 80–100)
NRBC # BLD: 0 /100 WBCS — SIGNIFICANT CHANGE UP (ref 0–0)
ORGANISM # SPEC MICROSCOPIC CNT: SIGNIFICANT CHANGE UP
PLATELET # BLD AUTO: 351 K/UL — SIGNIFICANT CHANGE UP (ref 150–400)
POTASSIUM SERPL-MCNC: 3.9 MMOL/L — SIGNIFICANT CHANGE UP (ref 3.5–5.3)
POTASSIUM SERPL-SCNC: 3.9 MMOL/L — SIGNIFICANT CHANGE UP (ref 3.5–5.3)
RBC # BLD: 3.21 M/UL — LOW (ref 3.8–5.2)
RBC # FLD: 15.3 % — HIGH (ref 10.3–14.5)
SODIUM SERPL-SCNC: 144 MMOL/L — SIGNIFICANT CHANGE UP (ref 135–145)
SPECIMEN SOURCE: SIGNIFICANT CHANGE UP
SPECIMEN SOURCE: SIGNIFICANT CHANGE UP
VANCOMYCIN FLD-MCNC: 21 UG/ML — SIGNIFICANT CHANGE UP
WBC # BLD: 8.26 K/UL — SIGNIFICANT CHANGE UP (ref 3.8–10.5)
WBC # FLD AUTO: 8.26 K/UL — SIGNIFICANT CHANGE UP (ref 3.8–10.5)

## 2018-08-08 PROCEDURE — 76775 US EXAM ABDO BACK WALL LIM: CPT | Mod: 26

## 2018-08-08 PROCEDURE — 99233 SBSQ HOSP IP/OBS HIGH 50: CPT | Mod: GC

## 2018-08-08 RX ADMIN — RASAGILINE 0.5 MILLIGRAM(S): 0.5 TABLET ORAL at 12:04

## 2018-08-08 RX ADMIN — SODIUM CHLORIDE 75 MILLILITER(S): 9 INJECTION, SOLUTION INTRAVENOUS at 04:42

## 2018-08-08 RX ADMIN — SODIUM CHLORIDE 75 MILLILITER(S): 9 INJECTION, SOLUTION INTRAVENOUS at 13:09

## 2018-08-08 RX ADMIN — MIRTAZAPINE 7.5 MILLIGRAM(S): 45 TABLET, ORALLY DISINTEGRATING ORAL at 12:04

## 2018-08-08 RX ADMIN — SIMVASTATIN 10 MILLIGRAM(S): 20 TABLET, FILM COATED ORAL at 21:37

## 2018-08-08 RX ADMIN — CARBIDOPA AND LEVODOPA 1 TABLET(S): 25; 100 TABLET ORAL at 13:09

## 2018-08-08 RX ADMIN — CARBIDOPA AND LEVODOPA 1 TABLET(S): 25; 100 TABLET ORAL at 05:10

## 2018-08-08 RX ADMIN — PIPERACILLIN AND TAZOBACTAM 25 GRAM(S): 4; .5 INJECTION, POWDER, LYOPHILIZED, FOR SOLUTION INTRAVENOUS at 05:10

## 2018-08-08 RX ADMIN — Medication 25 MICROGRAM(S): at 05:10

## 2018-08-08 RX ADMIN — Medication 1 APPLICATION(S): at 12:04

## 2018-08-08 RX ADMIN — PIPERACILLIN AND TAZOBACTAM 25 GRAM(S): 4; .5 INJECTION, POWDER, LYOPHILIZED, FOR SOLUTION INTRAVENOUS at 13:09

## 2018-08-08 RX ADMIN — PIPERACILLIN AND TAZOBACTAM 25 GRAM(S): 4; .5 INJECTION, POWDER, LYOPHILIZED, FOR SOLUTION INTRAVENOUS at 21:38

## 2018-08-08 RX ADMIN — ATENOLOL 25 MILLIGRAM(S): 25 TABLET ORAL at 05:10

## 2018-08-08 RX ADMIN — CARBIDOPA AND LEVODOPA 1 TABLET(S): 25; 100 TABLET ORAL at 21:37

## 2018-08-08 NOTE — PROGRESS NOTE ADULT - ASSESSMENT
·	LORENA, CKD 3  ·	Hypertension  ·	Anemia    Improving creatinine trend. Monitor h/h trend. Monitor BP trend. Titrate BP meds as needed. Salt restriction.   Will follow electrolytes and renal function trend. On IVF.

## 2018-08-08 NOTE — PROVIDER CONTACT NOTE (CRITICAL VALUE NOTIFICATION) - TEST AND RESULT REPORTED:
blood culture
moderate gm - rods left heel from 8/3 sample
tissue culture has rare gram neg rodes.
tissue culture from L heel
Vanco Trough  -34.2
Vanco through 29.3
1st tissue culture from 8/3- Left heel- final report, moderate psuedomonas aurugenosa- carbapenum resistant, moderate MRSA, few e. coli, numerous corynebacterium species- susceptibility not performed.  2nd tissue culture from 8/3, left heel- final report, rare enterobactercloacae/absuria, growth in fluid media only- MRSA

## 2018-08-08 NOTE — PROGRESS NOTE ADULT - SUBJECTIVE AND OBJECTIVE BOX
[facility], Division of Infectious Diseases  CHIDI Nava A. Lee  [phone]    Name: MIGUEL LORD  Age: 85y  Gender: Female  MRN: [id number]    Interval History--  Notes reviewed. lethargic, rouses easily. Remains an unreliable historian.   Discussed with home care, will need placement, cannot go back to assisted living with IV antibiotics  Moreover, if functional status does not improve will not be able to go back to assisted living at all.    Past Medical History--  Stroke  Hypothyroidism  Hyperlipidemia  Parkinson disease  Afib  Hypertension  Dementia  H/O total knee replacement, right      For details regarding the patient's social history, family history, and other miscellaneous elements, please refer the initial infectious diseases consultation and/or the admitting history and physical examination for this admission.    Allergies    No Known Allergies    Intolerances        Medications--  Antibiotics:  piperacillin/tazobactam IVPB. 3.375 Gram(s) IV Intermittent every 8 hours    Immunologic:    Other:  acetaminophen   Tablet PRN  acetaminophen   Tablet. PRN  ATENolol  Tablet  carbidopa/levodopa  25/100  collagenase Ointment  levothyroxine  mirtazapine  rasagiline Tablet  rivaroxaban  simvastatin  sodium chloride 0.45%.      Review of Systems--  Review of systems unable due to dementia.     Physical Examination--  Vital Signs: T(F): 98.3 (08-08-18 @ 05:04), Max: 98.5 (08-07-18 @ 20:30)  HR: 71 (08-08-18 @ 05:04)  BP: 149/73 (08-08-18 @ 05:04)  RR: 18 (08-08-18 @ 05:04)  SpO2: 99% (08-08-18 @ 05:04)  Wt(kg): --  General: Nontoxic-appearing Female in no acute distress.   HEENT: AT/NC. Pupils/EOM unable secondary to patient compliance. Oropharynx/dentition unable secondary to patient compliance.   Neck: Increased tone, not frankly rigid  Nodes: None palpable.  Lungs: Poor effort, no rales, wheezing or rhonchi  Heart: Regular rate and rhythm, poor compliance with exam.  Abdomen: Bowel sounds present and normoactive. Soft. Nondistended. Nontender. No sense of mass. No organomegaly.  Extremities: No cyanosis or clubbing. 1+ LE edema. Feet dressed.  Skin: Warm. Dry. Good turgor. No rash. No vasculitic stigmata.  Psychiatric: Pleasant.         Laboratory Studies--  CBC                        9.6    8.26  )-----------( 351      ( 08 Aug 2018 06:27 )             29.1       Chemistries  08-08    144  |  107  |  17  ----------------------------<  84  3.9   |  30  |  1.40<H>    Ca    8.8      08 Aug 2018 06:27    Vancomycin Level, Random: 21.0: The "VANCOMYCIN, RANDOM" test should only be ordered for patients with  severe renal dysfunction or on dialysis. Therapeutic ranges have not been  established and results must be interpreted in the context of patient's  clinical condition.  NOTE: Therapeutic range for Trough Vancomycin is 10-20 mcg/mL. ug/mL (08.08.18 @ 07:47)        Culture Data    Culture - Tissue with Gram Stain (collected 03 Aug 2018 16:46)  Source: .Tissue Other, left heel  Gram Stain (03 Aug 2018 22:46):    No polymorphonuclear cells seen    No organisms seen  Preliminary Report (06 Aug 2018 20:09):    Rare Enterobacter cloacae/asburiae    Growth in fluid media only Methicillin resistant Staphylococcus aureus    Rare Corynebacterium species  Organism: Enterobacter cloacae/absuria  Methicillin resistant Staphylococcus aureus (06 Aug 2018 20:04)  Organism: Methicillin resistant Staphylococcus aureus (06 Aug 2018 20:04)  Organism: Enterobacter cloacae/absuria (06 Aug 2018 20:03)    Culture - Tissue with Gram Stain (collected 03 Aug 2018 16:34)  Source: .Tissue Other, left heel  Gram Stain (06 Aug 2018 20:52):    Upon re-evaluation of gram stain:    No polymorphonuclear cells seen    Moderate Gram Negative Rods per oil power field    Few Gram Positive Cocci in Clusters per oil power field  Preliminary Report (05 Aug 2018 20:52):    Moderate Pseudomonas aeruginosa (Carbapenem Resistant)    Moderate Methicillin resistant Staphylococcus aureus    Few Escherichia coli    Numerous Corynebacterium species    "Susceptibilities not performed"  Organism: Pseudomonas aeruginosa (Carbapenem Resistant)  Methicillin resistant Staphylococcus aureus  Escherichia coli (06 Aug 2018 18:50)  Organism: Escherichia coli (06 Aug 2018 18:50)  Organism: Pseudomonas aeruginosa (Carbapenem Resistant) (05 Aug 2018 20:43)  Organism: Methicillin resistant Staphylococcus aureus (05 Aug 2018 20:43)

## 2018-08-08 NOTE — PROVIDER CONTACT NOTE (CRITICAL VALUE NOTIFICATION) - ACTION/TREATMENT ORDERED:
Awaiting further interventions
vanco stopped
no new orders
pt on abx, no further orders
no new orders at this time
pt on zosyn, vanco held for high truogh level.no new order at this time

## 2018-08-08 NOTE — PROGRESS NOTE ADULT - SUBJECTIVE AND OBJECTIVE BOX
Patient is a 85y old  Female who presents with a chief complaint of heel wounds (31 Jul 2018 14:16)      HPI:  85F with PMH HTN, a fib on Xarelto, Parkinson's Dx, Hx of CVA, bedbound presents from [facility] for evaluation of bilateral heel wounds. Patient A&O1-2 and doesn't answer questions appropriately, history obtained from family at bedside. Patient reports left sided ankle pain. Per family, patient had bilateral wounds for 7-9 months, being treated by wound care center in [place]. Visiting nurse assessed wounds today and stated R heel looked worse compared to last week and sent patient to the ED. Visiting nurse changed dressing on bilateral feet wounds. Family also requesting patient to become DNR and discuss advance directives.     In the ED: temp 98.3, HR 68, BP 98/62, RR 18, SpO2 91% RA. ESR 80, BUN/Cr: 20/1.2. Chest xray: no active disease. Xray right foot: No acute fracture, dislocation, or destructive lesions evident. Xray left foot: No acute fracture, dislocation, or destructive bone lesions evident. Soft tissue ulcer may be present inferior to the left calcaneus visible on the oblique view. Received IV Vancomycin. Dr. Álvarez, Wound Care, evaluated patient in the ED. (31 Jul 2018 14:16)      INTERVAL HPI/OVERNIGHT EVENTS:  Unable to assess due to patients baseline mental status. Patient is sitting up in bed, alert, and able to talk but not converse.     T(C): 36.8 (08-08-18 @ 05:04), Max: 36.9 (08-07-18 @ 20:30)  HR: 71 (08-08-18 @ 05:04) (62 - 71)  BP: 149/73 (08-08-18 @ 05:04) (126/67 - 149/73)  RR: 18 (08-08-18 @ 05:04) (17 - 18)  SpO2: 99% (08-08-18 @ 05:04) (94% - 99%)  Wt(kg): --  I&O's Summary    07 Aug 2018 07:01  -  08 Aug 2018 07:00  --------------------------------------------------------  IN: 1000 mL / OUT: 0 mL / NET: 1000 mL        REVIEW OF SYSTEMS:  Unable to assess due to patient's baseline mental status.    PHYSICAL EXAM:  GENERAL: NAD, well-groomed, well-developed  HEAD:  Atraumatic, Normocephalic  EYES: EOMI, PERRLA, conjunctiva and sclera clear  ENMT: No tonsillar erythema, exudates, or enlargement; Moist mucous membranes, Good dentition, No lesions  NECK: Supple, No JVD, Normal thyroid  NERVOUS SYSTEM:  Alert & Oriented X3, Good concentration; Motor Strength 5/5 B/L upper and lower extremities; DTRs 2+ intact and symmetric  CHEST/LUNG: Clear to percussion bilaterally; No rales, rhonchi, wheezing, or rubs  HEART: Regular rate and rhythm; No murmurs, rubs, or gallops  ABDOMEN: Soft, Nontender, Nondistended; Bowel sounds present  EXTREMITIES:  2+ Peripheral Pulses, No clubbing, cyanosis, or edema  LYMPH: No lymphadenopathy noted  SKIN: No rashes or lesions    MEDICATIONS  (STANDING):  ATENolol  Tablet 25 milliGRAM(s) Oral daily  carbidopa/levodopa  25/100 1 Tablet(s) Oral three times a day  collagenase Ointment 1 Application(s) Topical daily  levothyroxine 25 MICROGram(s) Oral daily  mirtazapine 7.5 milliGRAM(s) Oral daily  piperacillin/tazobactam IVPB. 3.375 Gram(s) IV Intermittent every 8 hours  rasagiline Tablet 0.5 milliGRAM(s) Oral daily  rivaroxaban 15 milliGRAM(s) Oral every 24 hours  simvastatin 10 milliGRAM(s) Oral at bedtime  sodium chloride 0.45%. 1000 milliLiter(s) (75 mL/Hr) IV Continuous <Continuous>    MEDICATIONS  (PRN):  acetaminophen   Tablet 650 milliGRAM(s) Oral every 6 hours PRN For Temp greater than 38 C (100.4 F)  acetaminophen   Tablet. 650 milliGRAM(s) Oral every 6 hours PRN Moderate Pain (4 - 6)      LABS:                        9.6    8.26  )-----------( 351      ( 08 Aug 2018 06:27 )             29.1     08-08    144  |  107  |  17  ----------------------------<  84  3.9   |  30  |  1.40<H>    Ca    8.8      08 Aug 2018 06:27          CAPILLARY BLOOD GLUCOSE                  RADIOLOGY & ADDITIONAL TESTS:    Imaging Personally Reviewed:       Advance Directives:      Palliative Care: Patient is a 85y old  Female who presents with a chief complaint of heel wounds (31 Jul 2018 14:16)      HPI:  85F with PMH HTN, a fib on Xarelto, Parkinson's Dx, Hx of CVA, bedbound presents from [facility] for evaluation of bilateral heel wounds. Patient A&O1-2 and doesn't answer questions appropriately, history obtained from family at bedside. Patient reports left sided ankle pain. Per family, patient had bilateral wounds for 7-9 months, being treated by wound care center in [place]. Visiting nurse assessed wounds today and stated R heel looked worse compared to last week and sent patient to the ED. Visiting nurse changed dressing on bilateral feet wounds. Family also requesting patient to become DNR and discuss advance directives.     In the ED: temp 98.3, HR 68, BP 98/62, RR 18, SpO2 91% RA. ESR 80, BUN/Cr: 20/1.2. Chest xray: no active disease. Xray right foot: No acute fracture, dislocation, or destructive lesions evident. Xray left foot: No acute fracture, dislocation, or destructive bone lesions evident. Soft tissue ulcer may be present inferior to the left calcaneus visible on the oblique view. Received IV Vancomycin. Dr. Álvarez, Wound Care, evaluated patient in the ED. (31 Jul 2018 14:16)      INTERVAL HPI/OVERNIGHT EVENTS:  Unable to assess due to patients baseline mental status. Patient is sitting up in bed, alert, and able to talk but not converse.     T(C): 36.8 (08-08-18 @ 05:04), Max: 36.9 (08-07-18 @ 20:30)  HR: 71 (08-08-18 @ 05:04) (62 - 71)  BP: 149/73 (08-08-18 @ 05:04) (126/67 - 149/73)  RR: 18 (08-08-18 @ 05:04) (17 - 18)  SpO2: 99% (08-08-18 @ 05:04) (94% - 99%)  Wt(kg): --  I&O's Summary    07 Aug 2018 07:01  -  08 Aug 2018 07:00  --------------------------------------------------------  IN: 1000 mL / OUT: 0 mL / NET: 1000 mL        REVIEW OF SYSTEMS:  Unable to assess due to patient's baseline mental status.    PHYSICAL EXAM:  GENERAL: NAD, well-groomed, well-developed  HEAD:  Atraumatic, Normocephalic  NECK: Supple, No JVD, Normal thyroid  CHEST/LUNG: Clear to percussion bilaterally; No rales, rhonchi, wheezing, or rubs  HEART: Regular rate and rhythm; No murmurs, rubs, or gallops  ABDOMEN: Soft, Nontender, Nondistended; Bowel sounds present  EXTREMITIES:  2+ Peripheral Pulses, No clubbing, cyanosis, or edema  LYMPH: No lymphadenopathy noted  SKIN: No rashes or lesions    MEDICATIONS  (STANDING):  ATENolol  Tablet 25 milliGRAM(s) Oral daily  carbidopa/levodopa  25/100 1 Tablet(s) Oral three times a day  collagenase Ointment 1 Application(s) Topical daily  levothyroxine 25 MICROGram(s) Oral daily  mirtazapine 7.5 milliGRAM(s) Oral daily  piperacillin/tazobactam IVPB. 3.375 Gram(s) IV Intermittent every 8 hours  rasagiline Tablet 0.5 milliGRAM(s) Oral daily  rivaroxaban 15 milliGRAM(s) Oral every 24 hours  simvastatin 10 milliGRAM(s) Oral at bedtime  sodium chloride 0.45%. 1000 milliLiter(s) (75 mL/Hr) IV Continuous <Continuous>    MEDICATIONS  (PRN):  acetaminophen   Tablet 650 milliGRAM(s) Oral every 6 hours PRN For Temp greater than 38 C (100.4 F)  acetaminophen   Tablet. 650 milliGRAM(s) Oral every 6 hours PRN Moderate Pain (4 - 6)      LABS:                        9.6    8.26  )-----------( 351      ( 08 Aug 2018 06:27 )             29.1     08-08    144  |  107  |  17  ----------------------------<  84  3.9   |  30  |  1.40<H>    Ca    8.8      08 Aug 2018 06:27          CAPILLARY BLOOD GLUCOSE                  RADIOLOGY & ADDITIONAL TESTS:    Imaging Personally Reviewed:       Advance Directives:      Palliative Care: Patient is a 85y old  Female who presents with a chief complaint of heel wounds (31 Jul 2018 14:16)      HPI:  85F with PMH HTN, a fib on Xarelto, Parkinson's Dx, Hx of CVA, bedbound presents from [facility] for evaluation of bilateral heel wounds. Patient A&O1-2 and doesn't answer questions appropriately, history obtained from family at bedside. Patient reports left sided ankle pain. Per family, patient had bilateral wounds for 7-9 months, being treated by wound care center in [place]. Visiting nurse assessed wounds today and stated R heel looked worse compared to last week and sent patient to the ED. Visiting nurse changed dressing on bilateral feet wounds. Family also requesting patient to become DNR and discuss advance directives.      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     T(C): 36.8 (08-08-18 @ 05:04), Max: 36.9 (08-07-18 @ 20:30)  HR: 71 (08-08-18 @ 05:04) (62 - 71)  BP: 149/73 (08-08-18 @ 05:04) (126/67 - 149/73)  RR: 18 (08-08-18 @ 05:04) (17 - 18)  SpO2: 99% (08-08-18 @ 05:04) (94% - 99%)  Wt(kg): --  I&O's Summary    07 Aug 2018 07:01  -  08 Aug 2018 07:00  --------------------------------------------------------  IN: 1000 mL / OUT: 0 mL / NET: 1000 mL        REVIEW OF SYSTEMS:  Unable to assess due to patient's baseline mental status.    PHYSICAL EXAM:  GENERAL: NAD, well-groomed, well-developed  HEAD:  Atraumatic, Normocephalic  NECK: Supple, No JVD,  CHEST/LUNG: Clear to percussion bilaterally; No rales, rhonchi, wheezing,   HEART: Regular rate and rhythm; No murmurs, no tachy   ABDOMEN: Soft, Nontender, Nondistended; Bowel sounds present  EXTREMITIES:  2+ Peripheral Pulses, No clubbing, cyanosis, or edema  SKIN: No rashes or lesions, lt heel ulcer     MEDICATIONS  (STANDING):  ATENolol  Tablet 25 milliGRAM(s) Oral daily  carbidopa/levodopa  25/100 1 Tablet(s) Oral three times a day  collagenase Ointment 1 Application(s) Topical daily  levothyroxine 25 MICROGram(s) Oral daily  mirtazapine 7.5 milliGRAM(s) Oral daily  piperacillin/tazobactam IVPB. 3.375 Gram(s) IV Intermittent every 8 hours  rasagiline Tablet 0.5 milliGRAM(s) Oral daily  rivaroxaban 15 milliGRAM(s) Oral every 24 hours  simvastatin 10 milliGRAM(s) Oral at bedtime  sodium chloride 0.45%. 1000 milliLiter(s) (75 mL/Hr) IV Continuous <Continuous>    MEDICATIONS  (PRN):  acetaminophen   Tablet 650 milliGRAM(s) Oral every 6 hours PRN For Temp greater than 38 C (100.4 F)  acetaminophen   Tablet. 650 milliGRAM(s) Oral every 6 hours PRN Moderate Pain (4 - 6)      LABS:                        9.6    8.26  )-----------( 351      ( 08 Aug 2018 06:27 )             29.1     08-08    144  |  107  |  17  ----------------------------<  84  3.9   |  30  |  1.40<H>    Ca    8.8      08 Aug 2018 06:27          CAPILLARY BLOOD GLUCOSE                  RADIOLOGY & ADDITIONAL TESTS:    Imaging Personally Reviewed:   no new test     Advance Directives:    dnr   Palliative Care :appropriate

## 2018-08-08 NOTE — PROGRESS NOTE ADULT - SUBJECTIVE AND OBJECTIVE BOX
Patient is a 85y old  Female who presents with a chief complaint of heel wounds (31 Jul 2018 14:16)      Patient seen in follow up for     PAST MEDICAL HISTORY:  Stroke  Hypothyroidism  Hyperlipidemia  Parkinson disease  Afib  Hypertension  Dementia    MEDICATIONS  (STANDING):  ATENolol  Tablet 25 milliGRAM(s) Oral daily  carbidopa/levodopa  25/100 1 Tablet(s) Oral three times a day  collagenase Ointment 1 Application(s) Topical daily  levothyroxine 25 MICROGram(s) Oral daily  mirtazapine 7.5 milliGRAM(s) Oral daily  piperacillin/tazobactam IVPB. 3.375 Gram(s) IV Intermittent every 8 hours  rasagiline Tablet 0.5 milliGRAM(s) Oral daily  simvastatin 10 milliGRAM(s) Oral at bedtime  sodium chloride 0.45%. 1000 milliLiter(s) (75 mL/Hr) IV Continuous <Continuous>    MEDICATIONS  (PRN):  acetaminophen   Tablet 650 milliGRAM(s) Oral every 6 hours PRN For Temp greater than 38 C (100.4 F)  acetaminophen   Tablet. 650 milliGRAM(s) Oral every 6 hours PRN Moderate Pain (4 - 6)    T(C): 37.1 (08-08-18 @ 13:11), Max: 37.1 (08-08-18 @ 13:11)  HR: 65 (08-08-18 @ 13:11) (62 - 71)  BP: 145/76 (08-08-18 @ 13:11) (126/67 - 149/73)  RR: 17 (08-08-18 @ 13:11) (17 - 20)  SpO2: 94% (08-08-18 @ 13:11) (94% - 99%)  Wt(kg): --  I&O's Detail    07 Aug 2018 07:01  -  08 Aug 2018 07:00  --------------------------------------------------------  IN:    sodium chloride 0.45%.: 375 mL    sodium chloride 0.9%: 525 mL    Solution: 100 mL  Total IN: 1000 mL    OUT:  Total OUT: 0 mL    Total NET: 1000 mL      08 Aug 2018 07:01  -  08 Aug 2018 16:29  --------------------------------------------------------  IN:    Oral Fluid: 480 mL  Total IN: 480 mL    OUT:  Total OUT: 0 mL    Total NET: 480 mL          PHYSICAL EXAM:  General: NAD  Respiratory: b/l air entry  Cardiovascular: S1 S2  Gastrointestinal: soft  Extremities:                            9.6    8.26  )-----------( 351      ( 08 Aug 2018 06:27 )             29.1     08-08    144  |  107  |  17  ----------------------------<  84  3.9   |  30  |  1.40<H>    Ca    8.8      08 Aug 2018 06:27                Sodium, Serum: 144 (08-08 @ 06:27)  Sodium, Serum: 146 (08-07 @ 06:40)  Sodium, Serum: 144 (08-06 @ 09:06)  Sodium, Serum: 142 (08-05 @ 06:53)    Creatinine, Serum: 1.40 (08-08 @ 06:27)  Creatinine, Serum: 1.70 (08-07 @ 06:40)  Creatinine, Serum: 1.70 (08-06 @ 09:06)  Creatinine, Serum: 1.10 (08-05 @ 06:53)    Potassium, Serum: 3.9 (08-08 @ 06:27)  Potassium, Serum: 3.9 (08-07 @ 06:40)  Potassium, Serum: 3.8 (08-06 @ 09:06)  Potassium, Serum: 3.7 (08-05 @ 06:53)    Hemoglobin: 9.6 (08-08 @ 06:27)  Hemoglobin: 9.7 (08-07 @ 06:40)  Hemoglobin: 9.6 (08-06 @ 09:08)  Hemoglobin: 9.8 (08-05 @ 06:53)

## 2018-08-08 NOTE — PROGRESS NOTE ADULT - ATTENDING COMMENTS
Patient seen and evaluated  No santyl ointment present bedside  Both wounds cleansed and dressed with acticoat flex-3, DSD at this time.  Continue IV antibiotics and offloading boots at all times    Wound care instructions:  1. cleanse wounds with normal saline, pat dry  2. apply nickel-thickness amount of santyl to wounds  3. dress with dry, sterile dressing with materials such as 4x4 gauze, abd padding jeffery and ace  4. change daily and monitor for signs of infection such as redness, swelling, and purulent drainage  please have patient follow up with Dr. Vargas in wound care clinic within 1 week of discharge.

## 2018-08-08 NOTE — PROGRESS NOTE ADULT - SUBJECTIVE AND OBJECTIVE BOX
86yo female seen bedside for her bilateral heel decubitus wounds, who is status post left heel wound debridement and bone biopsy on 8/3/18.                           9.6    8.26  )-----------( 351      ( 08 Aug 2018 06:27 )             29.1     08-08    144  |  107  |  17  ----------------------------<  84  3.9   |  30  |  1.40<H>    Ca    8.8      08 Aug 2018 06:27      Vital Signs Last 24 Hrs  T(C): 36.8 (08 Aug 2018 05:04), Max: 36.9 (07 Aug 2018 20:30)  T(F): 98.3 (08 Aug 2018 05:04), Max: 98.5 (07 Aug 2018 20:30)  HR: 71 (08 Aug 2018 05:04) (62 - 71)  BP: 149/73 (08 Aug 2018 05:04) (126/67 - 149/73)  BP(mean): --  RR: 18 (08 Aug 2018 05:04) (17 - 18)  SpO2: 99% (08 Aug 2018 05:04) (94% - 99%)    Objective exam:   vasc: pedal pulses mildly palpable; CFT < 3 seconds x10; TG WNL; no edema noted  Derm:  -wound to lateral plantar left heel noted to measure approximately 6cm x 6cm x 0.2 without probe to bone with fibro-granular base (80:20) and mild serous drainage noted to the dressing; mild malodor present, no periwound erythema or hyperkeratosis noted  -wound to plantar medial right heel noted to measure approximately 3cm x 4cm x 0.1cm without probe to bone with fibro-granular base (30:70) without drainage noted to the dressing, no malodor noted, no periwound erythema or hyperkeratosis noted

## 2018-08-08 NOTE — PROGRESS NOTE ADULT - PROBLEM SELECTOR PLAN 1
-Results of L bone Bx (8/3) showed acute L osteomyelitis  -Continue to Hold Vanc because last trough 29.3 (8/7/18 6:40 am), pending 8/8 am trough  -Discussion with family regarding PICC line later today  -Continue Zosyn 3.375g IV Q8H as per ID rec      -Podiatry Dr. Vargas, performed bone biopsy at bedside   -Continue IV Vancomycin 1000 mg Q12  dc as high trough   -Continue to follow Vanc trough (16.4 8/1/18), monitor renal function   -Continue 3.375 Zosyn mg IV Q8H   -Continue collagenese ointment in bilateral heels  -Continue off-loading of heels bilaterally  -Dr. Álvarez, wound care recommendations appreciated. Continue Collagenase, protective dressing  -D/C vanc as trough level elevated (37)  -F/U ID -Results of L bone Bx (8/3) showed acute L osteomyelitis  -Continue to Hold Vanc because last trough 29.3 (8/7/18 6:40 am), pending 8/8 am trough  -Discussion with family regarding PICC line  today  -Continue Zosyn 3.375g IV Q8H as per ID rec      -Podiatry Dr. Vargas, performed bone biopsy at bedside   -Continue IV Vancomycin 1000 mg Q12  dc as high trough   -Continue to follow Vanc trough (16.4 8/1/18), monitor renal function   -Continue 3.375 Zosyn mg IV Q8H   -Continue collagenese ointment in bilateral heels  -Continue off-loading of heels bilaterally  -Dr. Álvarez, wound care recommendations appreciated. Continue Collagenase, protective dressing  -D/C vanc as trough level elevated (37)  -F/U ID / resume vanc as per trough

## 2018-08-08 NOTE — PROGRESS NOTE ADULT - ASSESSMENT
Pressure ulcers both heels, stage 3  Osteomyelitis L heel  Path acute osteomyelitis  Cx polymicrobial incl MRSA and P. aeruginosa R meropenem  LORENA, hopefully plateaued

## 2018-08-08 NOTE — PROGRESS NOTE ADULT - PROBLEM SELECTOR PLAN 1
Acute OM  6 weeks total Rx  Recheck vanco level am  adan creatinine  Continue Zosyn  Redose Vancomycin if tomorrows level is <= 15  PICC and likely MEHUL once otherwise stabilized  Local care per podiatry.  IV vancomycin plus oral ciprofloxacin may be a viable option here

## 2018-08-09 ENCOUNTER — APPOINTMENT (OUTPATIENT)
Dept: WOUND CARE | Facility: CLINIC | Age: 83
End: 2018-08-09

## 2018-08-09 ENCOUNTER — TRANSCRIPTION ENCOUNTER (OUTPATIENT)
Age: 83
End: 2018-08-09

## 2018-08-09 LAB
ANION GAP SERPL CALC-SCNC: 9 MMOL/L — SIGNIFICANT CHANGE UP (ref 5–17)
BASOPHILS # BLD AUTO: 0.03 K/UL — SIGNIFICANT CHANGE UP (ref 0–0.2)
BASOPHILS NFR BLD AUTO: 0.3 % — SIGNIFICANT CHANGE UP (ref 0–2)
BUN SERPL-MCNC: 15 MG/DL — SIGNIFICANT CHANGE UP (ref 7–23)
CALCIUM SERPL-MCNC: 8.7 MG/DL — SIGNIFICANT CHANGE UP (ref 8.5–10.1)
CHLORIDE SERPL-SCNC: 106 MMOL/L — SIGNIFICANT CHANGE UP (ref 96–108)
CO2 SERPL-SCNC: 29 MMOL/L — SIGNIFICANT CHANGE UP (ref 22–31)
CREAT SERPL-MCNC: 1.3 MG/DL — SIGNIFICANT CHANGE UP (ref 0.5–1.3)
EOSINOPHIL # BLD AUTO: 0.11 K/UL — SIGNIFICANT CHANGE UP (ref 0–0.5)
EOSINOPHIL NFR BLD AUTO: 1.2 % — SIGNIFICANT CHANGE UP (ref 0–6)
GLUCOSE SERPL-MCNC: 88 MG/DL — SIGNIFICANT CHANGE UP (ref 70–99)
HCT VFR BLD CALC: 29.2 % — LOW (ref 34.5–45)
HGB BLD-MCNC: 9.4 G/DL — LOW (ref 11.5–15.5)
IMM GRANULOCYTES NFR BLD AUTO: 0.4 % — SIGNIFICANT CHANGE UP (ref 0–1.5)
LYMPHOCYTES # BLD AUTO: 2 K/UL — SIGNIFICANT CHANGE UP (ref 1–3.3)
LYMPHOCYTES # BLD AUTO: 21.7 % — SIGNIFICANT CHANGE UP (ref 13–44)
MCHC RBC-ENTMCNC: 28.8 PG — SIGNIFICANT CHANGE UP (ref 27–34)
MCHC RBC-ENTMCNC: 32.2 GM/DL — SIGNIFICANT CHANGE UP (ref 32–36)
MCV RBC AUTO: 89.6 FL — SIGNIFICANT CHANGE UP (ref 80–100)
MONOCYTES # BLD AUTO: 0.61 K/UL — SIGNIFICANT CHANGE UP (ref 0–0.9)
MONOCYTES NFR BLD AUTO: 6.6 % — SIGNIFICANT CHANGE UP (ref 2–14)
NEUTROPHILS # BLD AUTO: 6.43 K/UL — SIGNIFICANT CHANGE UP (ref 1.8–7.4)
NEUTROPHILS NFR BLD AUTO: 69.8 % — SIGNIFICANT CHANGE UP (ref 43–77)
NRBC # BLD: 0 /100 WBCS — SIGNIFICANT CHANGE UP (ref 0–0)
PLATELET # BLD AUTO: 354 K/UL — SIGNIFICANT CHANGE UP (ref 150–400)
POTASSIUM SERPL-MCNC: 3.7 MMOL/L — SIGNIFICANT CHANGE UP (ref 3.5–5.3)
POTASSIUM SERPL-SCNC: 3.7 MMOL/L — SIGNIFICANT CHANGE UP (ref 3.5–5.3)
RBC # BLD: 3.26 M/UL — LOW (ref 3.8–5.2)
RBC # FLD: 14.9 % — HIGH (ref 10.3–14.5)
SODIUM SERPL-SCNC: 144 MMOL/L — SIGNIFICANT CHANGE UP (ref 135–145)
VANCOMYCIN TROUGH SERPL-MCNC: 16.6 UG/ML — SIGNIFICANT CHANGE UP (ref 10–20)
WBC # BLD: 9.22 K/UL — SIGNIFICANT CHANGE UP (ref 3.8–10.5)
WBC # FLD AUTO: 9.22 K/UL — SIGNIFICANT CHANGE UP (ref 3.8–10.5)

## 2018-08-09 PROCEDURE — 99233 SBSQ HOSP IP/OBS HIGH 50: CPT | Mod: GC

## 2018-08-09 RX ORDER — MIRTAZAPINE 45 MG/1
7.5 TABLET, ORALLY DISINTEGRATING ORAL AT BEDTIME
Refills: 0 | Status: DISCONTINUED | OUTPATIENT
Start: 2018-08-10 | End: 2018-08-13

## 2018-08-09 RX ADMIN — CARBIDOPA AND LEVODOPA 1 TABLET(S): 25; 100 TABLET ORAL at 13:49

## 2018-08-09 RX ADMIN — MIRTAZAPINE 7.5 MILLIGRAM(S): 45 TABLET, ORALLY DISINTEGRATING ORAL at 11:43

## 2018-08-09 RX ADMIN — SIMVASTATIN 10 MILLIGRAM(S): 20 TABLET, FILM COATED ORAL at 22:40

## 2018-08-09 RX ADMIN — Medication 25 MICROGRAM(S): at 05:34

## 2018-08-09 RX ADMIN — Medication 1 APPLICATION(S): at 11:43

## 2018-08-09 RX ADMIN — PIPERACILLIN AND TAZOBACTAM 25 GRAM(S): 4; .5 INJECTION, POWDER, LYOPHILIZED, FOR SOLUTION INTRAVENOUS at 13:49

## 2018-08-09 RX ADMIN — SODIUM CHLORIDE 75 MILLILITER(S): 9 INJECTION, SOLUTION INTRAVENOUS at 18:20

## 2018-08-09 RX ADMIN — CARBIDOPA AND LEVODOPA 1 TABLET(S): 25; 100 TABLET ORAL at 22:40

## 2018-08-09 RX ADMIN — PIPERACILLIN AND TAZOBACTAM 25 GRAM(S): 4; .5 INJECTION, POWDER, LYOPHILIZED, FOR SOLUTION INTRAVENOUS at 05:35

## 2018-08-09 RX ADMIN — PIPERACILLIN AND TAZOBACTAM 25 GRAM(S): 4; .5 INJECTION, POWDER, LYOPHILIZED, FOR SOLUTION INTRAVENOUS at 22:40

## 2018-08-09 RX ADMIN — ATENOLOL 25 MILLIGRAM(S): 25 TABLET ORAL at 05:34

## 2018-08-09 RX ADMIN — CARBIDOPA AND LEVODOPA 1 TABLET(S): 25; 100 TABLET ORAL at 05:34

## 2018-08-09 RX ADMIN — RASAGILINE 0.5 MILLIGRAM(S): 0.5 TABLET ORAL at 11:43

## 2018-08-09 NOTE — CHART NOTE - NSCHARTNOTEFT_GEN_A_CORE
Rapid response follow up:     Patient responded to verbal and tactile stimuli. When questioned about SOB, chest pain, palpitations, abdominal pain or discomfort she shook her head.     Vital Signs Last 24 Hrs  T(C): 36.7 (09 Aug 2018 20:42), Max: 37.1 (09 Aug 2018 05:10)  T(F): 98 (09 Aug 2018 20:42), Max: 98.7 (09 Aug 2018 05:10)  HR: 101 (09 Aug 2018 20:42) (72 - 101)  BP: 136/79 (09 Aug 2018 20:42) (112/76 - 136/79)  BP(mean): --  RR: 18 (09 Aug 2018 20:42) (17 - 18)  SpO2: 98% (09 Aug 2018 20:42) (97% - 99%)    ROS:  Respiratory: denies SOB, MCKEON, cough, sputum production, wheezing, hemoptysis  Cardiovascular: denies CP, palpitations  Gastrointestinal: denies nausea, vomiting, abdominal pain   Neurologic: denies headache, weakness, dizziness   ROS negative except as noted above    Physical Exam:  General: NAD  HEENT: NCAT, PERRLA, EOMI bl, moist mucous membranes   Neurology: A&Ox1, nonfocal  Respiratory: CTA B/L, No W/R/R  CV: irregular rate, +S1/S2, no murmurs, rubs or gallops  Abdominal: Soft, NT, ND +BSx4  Extremities: No C/C/E, + peripheral pulses  Skin: warm, dry, normal color    A/P: Patient seen and examined at bedside. Patient responded to verbal stimuli. When questioned about SOB, chest pain, palpitations, abdominal pain or discomfort she shook her head. Patient currently hemodynamically stable. Will continue current regimen. Plan discussed with Dr. Davis

## 2018-08-09 NOTE — DISCHARGE NOTE ADULT - CARE PLAN
Principal Discharge DX:	Osteomyelitis of foot, left, acute  Goal:	Controlled  Assessment and plan of treatment:	You were diagnosed with a bone infection of the L heel complicated by your ulcer. You received a PICC line (Peripherally inserted central catheter) to receive IV antibiotics as they work well for bone infections. Follow up with Infectious Disease ()  Secondary Diagnosis:	Essential hypertension  Goal:	Controlled  Assessment and plan of treatment:	Continue home dose atenolol  Secondary Diagnosis:	Hypothyroidism, unspecified type  Goal:	Controlled  Assessment and plan of treatment:	Continue home dose of Synthroid  Secondary Diagnosis:	Parkinson disease  Goal:	Controlled  Assessment and plan of treatment:	Continue home medication Carbadopa/Levodopa at recommended dose (25/100)  Secondary Diagnosis:	Atrial fibrillation, unspecified type  Assessment and plan of treatment:	Continue home dose of Xarelto.  Secondary Diagnosis:	Dementia without behavioral disturbance, unspecified dementia type  Goal:	Controlled  Assessment and plan of treatment:	Continue home medication Azilect and Mirtazapine as prescribed Principal Discharge DX:	Osteomyelitis of foot, left, acute  Goal:	Controlled  Assessment and plan of treatment:	You were diagnosed with a bone infection of the L heel complicated by your ulcer. You received a PICC line (Peripherally inserted central catheter) to receive IV antibiotics as they work well for bone infections. Follow up with Infectious Disease / total 6wk abx  cipro and vanco fu trough closely  Secondary Diagnosis:	Essential hypertension  Goal:	Controlled  Assessment and plan of treatment:	Continue home dose atenolol/ low salt diet  Secondary Diagnosis:	Hypothyroidism, unspecified type  Goal:	Controlled  Assessment and plan of treatment:	Continue home dose of Synthroid  Secondary Diagnosis:	Parkinson disease  Goal:	Controlled stable  Assessment and plan of treatment:	Continue home medication Carbadopa/Levodopa at recommended dose (25/100)  Secondary Diagnosis:	Atrial fibrillation, unspecified type  Goal:	rate controlled  Assessment and plan of treatment:	Continue home dose of Xarelto.  Secondary Diagnosis:	Dementia without behavioral disturbance, unspecified dementia type  Goal:	Controlled stable  Assessment and plan of treatment:	Continue home medication Azilect and Mirtazapine as prescribed Principal Discharge DX:	Osteomyelitis of foot, left, acute  Goal:	Controlled  Assessment and plan of treatment:	You were diagnosed with a bone infection of the L heel complicated by your ulcer. You received a PICC line (Peripherally inserted central catheter) to receive IV antibiotics as they work well for bone infections. Follow up with Infectious Disease / total 6wk abx  cipro and vanco until 9/21/18 fu trough closely  Secondary Diagnosis:	Essential hypertension  Goal:	Controlled  Assessment and plan of treatment:	Continue home dose atenolol/ low salt diet  Secondary Diagnosis:	Hypothyroidism, unspecified type  Goal:	Controlled  Assessment and plan of treatment:	Continue home dose of Synthroid  Secondary Diagnosis:	Parkinson disease  Goal:	Controlled stable  Assessment and plan of treatment:	Continue home medication Carbadopa/Levodopa at recommended dose (25/100)  Secondary Diagnosis:	Atrial fibrillation, unspecified type  Goal:	rate controlled  Assessment and plan of treatment:	Continue home dose of Xarelto.  Secondary Diagnosis:	Dementia without behavioral disturbance, unspecified dementia type  Goal:	Controlled stable  Assessment and plan of treatment:	Continue home medication Azilect and Mirtazapine as prescribed

## 2018-08-09 NOTE — PROGRESS NOTE ADULT - SUBJECTIVE AND OBJECTIVE BOX
[facility], Division of Infectious Diseases  CHIDI Nava A. Lee  [phone]  Name: MIGUEL LORD  Age: 85y  Gender: Female  MRN: [id number]    Interval History--  Notes reviewed  nonverbal  arousable    Past Medical History--  Stroke  Hypothyroidism  Hyperlipidemia  Parkinson disease  Afib  Hypertension  Dementia  H/O total knee replacement, right      For details regarding the patient's social history, family history, and other miscellaneous elements, please refer the initial infectious diseases consultation and/or the admitting history and physical examination for this admission.    Allergies    No Known Allergies    Intolerances        Medications--  Antibiotics:  piperacillin/tazobactam IVPB. 3.375 Gram(s) IV Intermittent every 8 hours    Immunologic:    Other:  acetaminophen   Tablet PRN  acetaminophen   Tablet. PRN  ATENolol  Tablet  carbidopa/levodopa  25/100  collagenase Ointment  levothyroxine  mirtazapine  rasagiline Tablet  simvastatin  sodium chloride 0.45%.      Review of Systems--  A 10-point review of systems was obtained.   unable to obtain  Review of systems otherwise negative except as previously noted.    Physical Examination--  Vital Signs: T(F): 98.7 (08-09-18 @ 05:10), Max: 98.7 (08-08-18 @ 13:11)  HR: 92 (08-09-18 @ 05:10)  BP: 112/76 (08-09-18 @ 05:10)  RR: 17 (08-09-18 @ 05:10)  SpO2: 99% (08-09-18 @ 05:10)  Wt(kg): --  General: Nontoxic-appearing Female in no acute distress.  HEENT: AT/NC.  Anicteric.   Neck: Not rigid. No sense of mass.  Nodes: None palpable.  Lungs: noncompliant with exam  Heart: Regular rate and rhythm.   Abdomen: Bowel sounds present and normoactive. Soft. Nondistended. Nontender.   Back: No spinal tenderness. No costovertebral angle tenderness.   Extremities: No cyanosis or clubbing. + edema. heel boots  Skin: Warm. Dry. Good turgor. No rash. No vasculitic stigmata.          Laboratory Studies--  CBC                        9.4    9.22  )-----------( 354      ( 09 Aug 2018 06:10 )             29.2       Chemistries  08-09    144  |  106  |  15  ----------------------------<  88  3.7   |  29  |  1.30    Ca    8.7      09 Aug 2018 06:10        Culture Data    Culture - Tissue with Gram Stain (collected 03 Aug 2018 16:46)  Source: .Tissue Other, left heel  Gram Stain (03 Aug 2018 22:46):    No polymorphonuclear cells seen    No organisms seen  Final Report (08 Aug 2018 18:32):    Rare Enterobacter cloacae/asburiae    Growth in fluid media only Methicillin resistant Staphylococcus aureus    Rare Corynebacterium species  Organism: Enterobacter cloacae/absuria  Methicillin resistant Staphylococcus aureus (08 Aug 2018 18:32)  Organism: Methicillin resistant Staphylococcus aureus (08 Aug 2018 18:32)  Organism: Enterobacter cloacae/absuria (08 Aug 2018 18:32)    Culture - Tissue with Gram Stain (collected 03 Aug 2018 16:34)  Source: .Tissue Other, left heel  Gram Stain (06 Aug 2018 20:52):    Upon re-evaluation of gram stain:    No polymorphonuclear cells seen    Moderate Gram Negative Rods per oil power field    Few Gram Positive Cocci in Clusters per oil power field  Final Report (08 Aug 2018 18:24):    Moderate Pseudomonas aeruginosa (Carbapenem Resistant)    Moderate Methicillin resistant Staphylococcus aureus    Few Escherichia coli    Numerous Corynebacterium species    "Susceptibilities not performed"  Organism: Pseudomonas aeruginosa (Carbapenem Resistant)  Methicillin resistant Staphylococcus aureus  Escherichia coli (08 Aug 2018 18:24)  Organism: Escherichia coli (08 Aug 2018 18:24)  Organism: Methicillin resistant Staphylococcus aureus (08 Aug 2018 18:24)  Organism: Pseudomonas aeruginosa (Carbapenem Resistant) (08 Aug 2018 18:24)      Surgical Pathology Final Report -  (08.03.18 @ 13:22)    Surgical Pathology Final Report - :   ACCESSION No:  [id number]    MIGUEL LORD                        1        Surgical Final Report          Final Diagnosis  Periosteal tissue and bone with mild acute osteomyelitis (from  left heel).    Cortez Bellamy M.D.  (Electronic Signature)  Reported on: 08/07/18    Clinical Information  Osteomyelitis left heel    Specimen Description  Left heel bone    Gross Description  The specimen is received in formalin and the specimen container  is labeled: Left heel bone. It consists of two pieces of tan-  yellow bony tissue measuring in aggregate 1.7 x 1.5 x 0.3 cm. The  specimen is entirely submitted following decalcification. One  cassette.    In addition to other data that may appear on the specimen  container, the label has been inspected to confirm the presence  of the patient's name and date of birth.  EVIE 8/7/2018 6:33 AM

## 2018-08-09 NOTE — PROGRESS NOTE ADULT - SUBJECTIVE AND OBJECTIVE BOX
Patient is a 85y old  Female who presents with a chief complaint of heel wounds (31 Jul 2018 14:16)      Patient seen in follow up for LORENA.     PAST MEDICAL HISTORY:  Stroke  Hypothyroidism  Hyperlipidemia  Parkinson disease  Afib  Hypertension  Dementia    MEDICATIONS  (STANDING):  ATENolol  Tablet 25 milliGRAM(s) Oral daily  carbidopa/levodopa  25/100 1 Tablet(s) Oral three times a day  collagenase Ointment 1 Application(s) Topical daily  levothyroxine 25 MICROGram(s) Oral daily  mirtazapine 7.5 milliGRAM(s) Oral daily  piperacillin/tazobactam IVPB. 3.375 Gram(s) IV Intermittent every 8 hours  rasagiline Tablet 0.5 milliGRAM(s) Oral daily  simvastatin 10 milliGRAM(s) Oral at bedtime  sodium chloride 0.45%. 1000 milliLiter(s) (75 mL/Hr) IV Continuous <Continuous>    MEDICATIONS  (PRN):  acetaminophen   Tablet 650 milliGRAM(s) Oral every 6 hours PRN For Temp greater than 38 C (100.4 F)  acetaminophen   Tablet. 650 milliGRAM(s) Oral every 6 hours PRN Moderate Pain (4 - 6)    T(C): 36.8 (08-09-18 @ 13:25), Max: 37.1 (08-08-18 @ 13:11)  HR: 89 (08-09-18 @ 13:25) (65 - 108)  BP: 114/73 (08-09-18 @ 13:25) (104/59 - 149/73)  RR: 17 (08-09-18 @ 13:25)  SpO2: 97% (08-09-18 @ 13:25)  Wt(kg): --  I&O's Detail    08 Aug 2018 07:01  -  09 Aug 2018 07:00  --------------------------------------------------------  IN:    Oral Fluid: 480 mL    sodium chloride 0.45%.: 1800 mL    Solution: 200 mL  Total IN: 2480 mL    OUT:  Total OUT: 0 mL    Total NET: 2480 mL      PHYSICAL EXAM:  General: NAD  Respiratory: b/l air entry  Cardiovascular: S1 S2  Gastrointestinal: soft  Extremities:  edema     LABORATORY:                        9.4    9.22  )-----------( 354      ( 09 Aug 2018 06:10 )             29.2     08-09    144  |  106  |  15  ----------------------------<  88  3.7   |  29  |  1.30    Ca    8.7      09 Aug 2018 06:10      Sodium, Serum: 144 mmol/L (08-09 @ 06:10)  Sodium, Serum: 144 mmol/L (08-08 @ 06:27)    Potassium, Serum: 3.7 mmol/L (08-09 @ 06:10)  Potassium, Serum: 3.9 mmol/L (08-08 @ 06:27)    Hemoglobin: 9.4 g/dL (08-09 @ 06:10)  Hemoglobin: 9.6 g/dL (08-08 @ 06:27)  Hemoglobin: 9.7 g/dL (08-07 @ 06:40)    Creatinine, Serum 1.30 (08-09 @ 06:10)  Creatinine, Serum 1.40 (08-08 @ 06:27)  Creatinine, Serum 1.70 (08-07 @ 06:40)

## 2018-08-09 NOTE — PROGRESS NOTE ADULT - PROBLEM SELECTOR PLAN 5
chronic, stable  -Rate controlled on Atenolol 25 mg PO daily  -Anticoagulated with 15 mg Xarelto Q24H chronic, stable  -Rate controlled on Atenolol 25 mg PO daily  -Anticoagulated with 15 mg Xarelto Q24H  hold for picc resume in am

## 2018-08-09 NOTE — PROGRESS NOTE ADULT - SUBJECTIVE AND OBJECTIVE BOX
Patient is a 85y old  Female who presents with a chief complaint of heel wounds (31 Jul 2018 14:16)      HPI:  85F with PMH HTN, a fib on Xarelto, Parkinson's Dx, Hx of CVA, bedbound presents from [facility] for evaluation of bilateral heel wounds. Patient A&O1-2 and doesn't answer questions appropriately, history obtained from family at bedside. Patient reports left sided ankle pain. Per family, patient had bilateral wounds for 7-9 months, being treated by wound care center in [place]. Visiting nurse assessed wounds today and stated R heel looked worse compared to last week and sent patient to the ED. Visiting nurse changed dressing on bilateral feet wounds. Family also requesting patient to become DNR and discuss advance directives.     In the ED: temp 98.3, HR 68, BP 98/62, RR 18, SpO2 91% RA. ESR 80, BUN/Cr: 20/1.2. Chest xray: no active disease. Xray right foot: No acute fracture, dislocation, or destructive lesions evident. Xray left foot: No acute fracture, dislocation, or destructive bone lesions evident. Soft tissue ulcer may be present inferior to the left calcaneus visible on the oblique view. Received IV Vancomycin. Dr. Álvarez, Wound Care, evaluated patient in the ED. (31 Jul 2018 14:16)      INTERVAL HPI/OVERNIGHT EVENTS:  T(C): 37.1 (08-09-18 @ 05:10), Max: 37.1 (08-08-18 @ 13:11)  HR: 92 (08-09-18 @ 05:10) (65 - 108)  BP: 112/76 (08-09-18 @ 05:10) (104/59 - 145/76)  RR: 17 (08-09-18 @ 05:10) (16 - 17)  SpO2: 99% (08-09-18 @ 05:10) (94% - 99%)  Wt(kg): --  I&O's Summary    08 Aug 2018 07:01  -  09 Aug 2018 07:00  --------------------------------------------------------  IN: 2480 mL / OUT: 0 mL / NET: 2480 mL        REVIEW OF SYSTEMS:  Unable to assess due to pt's baseline mental status.  PHYSICAL EXAM:  GENERAL: NAD, well-nourished, well-developed  HEAD:  Atraumatic, Normocephalic  NECK: Supple, No JVD, Normal thyroid  CHEST/LUNG: Clear to percussion bilaterally; No rales, rhonchi, wheezing, or rubs  HEART: Regular rate and rhythm; No murmurs, rubs, or gallops  ABDOMEN: Soft, Nontender, Nondistended; Bowel sounds present  EXTREMITIES:  2+ Peripheral Pulses, No clubbing, cyanosis, or edema. Bandages bilaterally on heels.  LYMPH: No lymphadenopathy noted  SKIN: No rashes or lesions    MEDICATIONS  (STANDING):  ATENolol  Tablet 25 milliGRAM(s) Oral daily  carbidopa/levodopa  25/100 1 Tablet(s) Oral three times a day  collagenase Ointment 1 Application(s) Topical daily  levothyroxine 25 MICROGram(s) Oral daily  mirtazapine 7.5 milliGRAM(s) Oral daily  piperacillin/tazobactam IVPB. 3.375 Gram(s) IV Intermittent every 8 hours  rasagiline Tablet 0.5 milliGRAM(s) Oral daily  simvastatin 10 milliGRAM(s) Oral at bedtime  sodium chloride 0.45%. 1000 milliLiter(s) (75 mL/Hr) IV Continuous <Continuous>    MEDICATIONS  (PRN):  acetaminophen   Tablet 650 milliGRAM(s) Oral every 6 hours PRN For Temp greater than 38 C (100.4 F)  acetaminophen   Tablet. 650 milliGRAM(s) Oral every 6 hours PRN Moderate Pain (4 - 6)      LABS:                        9.4    9.22  )-----------( 354      ( 09 Aug 2018 06:10 )             29.2     08-09    144  |  106  |  15  ----------------------------<  88  3.7   |  29  |  1.30    Ca    8.7      09 Aug 2018 06:10          CAPILLARY BLOOD GLUCOSE                  RADIOLOGY & ADDITIONAL TESTS:    Imaging Personally Reviewed:       Advance Directives:      Palliative Care: Patient is a 85y old  Female who presents with a chief complaint of heel wounds (31 Jul 2018 14:16)      HPI:  85F with PMH HTN, a fib on Xarelto, Parkinson's Dx, Hx of CVA, bedbound presents from [facility] for evaluation of bilateral heel wounds. Patient A&O1-2 and doesn't answer questions appropriately, history obtained from family at bedside. Patient reports left sided ankle pain. Per family, patient had bilateral wounds for 7-9 months, being treated by wound care center in [place]. Visiting nurse assessed wounds today and stated R heel looked worse compared to last week and sent patient to the ED. Visiting nurse changed dressing on bilateral feet wounds. Family also requesting patient to become DNR and discuss advance directives.     In the ED: temp 98.3, HR 68, BP 98/62, RR 18, SpO2 91% RA. ESR 80, BUN/Cr: 20/1.2. Chest xray: no active disease. Xray right foot: No acute fracture, dislocation, or destructive lesions evident. Xray left foot: No acute fracture, dislocation, or destructive bone lesions evident. Soft tissue ulcer may be present inferior to the left calcaneus visible on the oblique view. Received IV Vancomycin. Dr. Álvarez, Wound Care, evaluated patient in the ED. (31 Jul 2018 14:16)      INTERVAL HPI/OVERNIGHT EVENTS:  No events overnight.     T(C): 37.1 (08-09-18 @ 05:10), Max: 37.1 (08-08-18 @ 13:11)  HR: 92 (08-09-18 @ 05:10) (65 - 108)  BP: 112/76 (08-09-18 @ 05:10) (104/59 - 145/76)  RR: 17 (08-09-18 @ 05:10) (16 - 17)  SpO2: 99% (08-09-18 @ 05:10) (94% - 99%)  Wt(kg): --  I&O's Summary    08 Aug 2018 07:01  -  09 Aug 2018 07:00  --------------------------------------------------------  IN: 2480 mL / OUT: 0 mL / NET: 2480 mL        REVIEW OF SYSTEMS:  Unable to assess due to pt's baseline mental status.  PHYSICAL EXAM:  GENERAL: NAD, well-nourished, well-developed  HEAD:  Atraumatic, Normocephalic  NECK: Supple, No JVD, Normal thyroid  CHEST/LUNG: Clear to percussion bilaterally; No rales, rhonchi, wheezing, or rubs  HEART: Regular rate and rhythm; No murmurs, rubs, or gallops  ABDOMEN: Soft, Nontender, Nondistended; Bowel sounds present  EXTREMITIES:  2+ Peripheral Pulses, No clubbing, cyanosis, or edema. Bandages bilaterally on heels.  LYMPH: No lymphadenopathy noted  SKIN: No rashes or lesions    MEDICATIONS  (STANDING):  ATENolol  Tablet 25 milliGRAM(s) Oral daily  carbidopa/levodopa  25/100 1 Tablet(s) Oral three times a day  collagenase Ointment 1 Application(s) Topical daily  levothyroxine 25 MICROGram(s) Oral daily  mirtazapine 7.5 milliGRAM(s) Oral daily  piperacillin/tazobactam IVPB. 3.375 Gram(s) IV Intermittent every 8 hours  rasagiline Tablet 0.5 milliGRAM(s) Oral daily  simvastatin 10 milliGRAM(s) Oral at bedtime  sodium chloride 0.45%. 1000 milliLiter(s) (75 mL/Hr) IV Continuous <Continuous>    MEDICATIONS  (PRN):  acetaminophen   Tablet 650 milliGRAM(s) Oral every 6 hours PRN For Temp greater than 38 C (100.4 F)  acetaminophen   Tablet. 650 milliGRAM(s) Oral every 6 hours PRN Moderate Pain (4 - 6)      LABS:                        9.4    9.22  )-----------( 354      ( 09 Aug 2018 06:10 )             29.2     08-09    144  |  106  |  15  ----------------------------<  88  3.7   |  29  |  1.30    Ca    8.7      09 Aug 2018 06:10          CAPILLARY BLOOD GLUCOSE                  RADIOLOGY & ADDITIONAL TESTS:    Imaging Personally Reviewed:       Advance Directives:      Palliative Care: Patient is a 85y old  Female who presents with a chief complaint of heel wounds (31 Jul 2018 14:16)      HPI:  85F with PMH HTN, a fib on Xarelto, Parkinson's Dx, Hx of CVA, bedbound presents from [facility] for evaluation of bilateral heel wounds. Patient A&O1-2 and doesn't answer questions appropriately, history obtained from family at bedside. Patient reports left sided ankle pain. Per family, patient had bilateral wounds for 7-9 months, being treated by wound care center in [place]. Visiting nurse assessed wounds today and stated R heel looked worse compared to last week and sent patient to the ED. Visiting nurse changed dressing on bilateral feet wounds. Family also requesting patient to become DNR and discuss advance directives.        admitted for bilateral heel cellulitis L heel osteomyelitis with positive mrsa and pseudomonas     INTERVAL HPI/OVERNIGHT EVENTS:  No events overnight , no fever  , no distress .    T(C): 37.1 (08-09-18 @ 05:10), Max: 37.1 (08-08-18 @ 13:11)  HR: 92 (08-09-18 @ 05:10) (65 - 108)  BP: 112/76 (08-09-18 @ 05:10) (104/59 - 145/76)  RR: 17 (08-09-18 @ 05:10) (16 - 17)  SpO2: 99% (08-09-18 @ 05:10) (94% - 99%)  Wt(kg): --  I&O's Summary    08 Aug 2018 07:01  -  09 Aug 2018 07:00  --------------------------------------------------------  IN: 2480 mL / OUT: 0 mL / NET: 2480 mL        REVIEW OF SYSTEMS:  Unable to assess due to pt's baseline mental status.  PHYSICAL EXAM:  GENERAL: NAD, well-nourished, well-developed  HEAD:  Atraumatic, Normocephalic  NECK: Supple, No JVD,   CHEST/LUNG: Clear to percussion bilaterally; No rales, rhonchi, wheezing,  HEART: Regular rate and rhythm; No murmurs, no tachy   ABDOMEN: Soft, Nontender, Nondistended; Bowel sounds present  EXTREMITIES:  2+ Peripheral Pulses, No clubbing, cyanosis, or edema. Bandages bilaterally on heels.  SKIN: No rashes or lesions    MEDICATIONS  (STANDING):  ATENolol  Tablet 25 milliGRAM(s) Oral daily  carbidopa/levodopa  25/100 1 Tablet(s) Oral three times a day  collagenase Ointment 1 Application(s) Topical daily  levothyroxine 25 MICROGram(s) Oral daily  mirtazapine 7.5 milliGRAM(s) Oral daily  piperacillin/tazobactam IVPB. 3.375 Gram(s) IV Intermittent every 8 hours  rasagiline Tablet 0.5 milliGRAM(s) Oral daily  simvastatin 10 milliGRAM(s) Oral at bedtime  sodium chloride 0.45%. 1000 milliLiter(s) (75 mL/Hr) IV Continuous <Continuous>    MEDICATIONS  (PRN):  acetaminophen   Tablet 650 milliGRAM(s) Oral every 6 hours PRN For Temp greater than 38 C (100.4 F)  acetaminophen   Tablet. 650 milliGRAM(s) Oral every 6 hours PRN Moderate Pain (4 - 6)      LABS:                        9.4    9.22  )-----------( 354      ( 09 Aug 2018 06:10 )             29.2     08-09    144  |  106  |  15  ----------------------------<  88  3.7   |  29  |  1.30    Ca    8.7      09 Aug 2018 06:10                          RADIOLOGY & ADDITIONAL TESTS:    Imaging Personally Reviewed:     no new test   Advance Directives:  dnr     Palliative care - appropriate

## 2018-08-09 NOTE — DISCHARGE NOTE ADULT - SECONDARY DIAGNOSIS.
Atrial fibrillation, unspecified type Essential hypertension Parkinson disease Dementia without behavioral disturbance, unspecified dementia type Hypothyroidism, unspecified type

## 2018-08-09 NOTE — DISCHARGE NOTE ADULT - MEDICATION SUMMARY - MEDICATIONS TO TAKE
I will START or STAY ON the medications listed below when I get home from the hospital:    Xarelto 15 mg oral tablet  -- Indication: For Afib     mirtazapine 7.5 mg oral tablet  -- Indication: For insommnia     simvastatin 10 mg oral tablet  -- Indication: For Hld     Azilect 0.5 mg oral tablet  -- Indication: For Parkinson disease    carbidopa-levodopa 25 mg-100 mg oral tablet  -- 1 tab(s) by mouth 3 times a day  -- Indication: For Parkinson disease    atenolol 25 mg oral tablet  -- Indication: For Hypertension    collagenase 250 units/g topical ointment  -- 1 application on skin once a day  -- Indication: For Osteomyelitis of foot, left, acute    vancomycin 1 g intravenous injection  -- 1 gram(s) intravenous once a day/ trough before 3rd dose fu closely dose with cr total 6wk   -- Indication: For Osteomyelitis, unspecified site, unspecified type    ciprofloxacin 500 mg oral tablet  -- 1 tab(s) by mouth every 12 hours total 6wk  -- Indication: For Osteomyelitis of foot, left, acute    levothyroxine 25 mcg (0.025 mg) oral tablet  -- 1 tab(s) by mouth once a day  -- Indication: For Hypothyroidism I will START or STAY ON the medications listed below when I get home from the hospital:    rivaroxaban 15 mg oral tablet  -- 1 tab(s) by mouth once a day (in the evening)  -- Indication: For Afib    mirtazapine 7.5 mg oral tablet  -- 1 tab(s) by mouth once a day (at bedtime)  -- Indication: For Depression    simvastatin 10 mg oral tablet  -- 1 tab(s) by mouth once a day (at bedtime)  -- Indication: For HLD    rasagiline 0.5 mg oral tablet  -- 1 tab(s) by mouth once a day  -- Indication: For Parkinson disease    carbidopa-levodopa 25 mg-100 mg oral tablet  -- 1 tab(s) by mouth 3 times a day  -- Indication: For Parkinson disease    atenolol 25 mg oral tablet  -- 1 tab(s) by mouth once a day  -- Indication: For Hypertension    collagenase 250 units/g topical ointment  -- 1 application on skin once a day  -- Indication: For Osteomyelitis of foot, left, acute    vancomycin 1 g intravenous injection  -- 1 gram(s) intravenous once a day for 6 weeks  until 9/21    weekly cbc, bmp, Esr, Vanc levels on Mondays faxed to [phone]  -- Indication: For Osteomyelitis of foot, left, acute    ciprofloxacin 500 mg oral tablet  -- 1 tab(s) by mouth every 12 hours for a total of 6 weeks until 9/21  -- Indication: For Osteomyelitis of foot, left, acute    levothyroxine 25 mcg (0.025 mg) oral tablet  -- 1 tab(s) by mouth once a day  -- Indication: For Hypothyroidism    ascorbic acid 500 mg oral tablet  -- 1 tab(s) by mouth once a day  -- Indication: For Vitamin I will START or STAY ON the medications listed below when I get home from the hospital:    rivaroxaban 15 mg oral tablet  -- 1 tab(s) by mouth once a day (in the evening)  -- Indication: For Afib    mirtazapine 7.5 mg oral tablet  -- 1 tab(s) by mouth once a day (at bedtime)  -- Indication: For Depression    simvastatin 10 mg oral tablet  -- 1 tab(s) by mouth once a day (at bedtime)  -- Indication: For HLD    rasagiline 0.5 mg oral tablet  -- 1 tab(s) by mouth once a day  -- Indication: For Parkinson disease    carbidopa-levodopa 25 mg-100 mg oral tablet  -- 1 tab(s) by mouth 3 times a day  -- Indication: For Parkinson disease    atenolol 25 mg oral tablet  -- 1 tab(s) by mouth once a day  -- Indication: For Hypertension    collagenase 250 units/g topical ointment  -- 1 application on skin once a day  -- Indication: For Osteomyelitis of foot, left, acute    vancomycin 1 g intravenous injection  -- 1 gram(s) intravenous once a day for 6 weeks  until 9/21    weekly cbc, bmp, Esr, Vanc levels on Mondays faxed to [phone]  -- Indication: For Osteomyelitis of foot, left, acute    ciprofloxacin 500 mg oral tablet  -- 1 tab(s) by mouth every 12 hours for a total of 6 weeks until 9/21  -- Indication: For Osteomyelitis of foot, left, acute    levothyroxine 25 mcg (0.025 mg) oral tablet  -- 1 tab(s) by mouth once a day  -- Indication: For Hypothyroidism    ascorbic acid 500 mg oral tablet  -- 1 tab(s) by mouth once a day  -- Indication: For Vitamin

## 2018-08-09 NOTE — CHART NOTE - NSCHARTNOTEFT_GEN_A_CORE
Resident Rapid Response Note    Rapid response was called at 1732 for increased lethargy.     Events leading up to Rapid Response:    Patient was seen and examined at the bedside by the rapid response team and resident medicine team.  ICU NP at bedside. Patient was found lying in bed. RN was not overly familiar with Pt's baseline mental status. Telemetry monitor showed a-fib with HR .  As per prior discussion with family, there are days when she sleeps most or all of the day, where other days she is more alert, sitting in bed, and able to mutter sentences. VSS    Rapid Response Vital Signs:  BP: 117/68  HR: 92  RR: 18  SpO2:  98% on RA  Temp: 100.0 Rectal  FS: 110    Vital Signs Last 24 Hrs  T(C): 36.8 (09 Aug 2018 13:25), Max: 37.1 (09 Aug 2018 05:10)  T(F): 98.3 (09 Aug 2018 13:25), Max: 98.7 (09 Aug 2018 05:10)  HR: 89 (09 Aug 2018 13:25) (89 - 108)  BP: 114/73 (09 Aug 2018 13:25) (104/59 - 114/73)  RR: 17 (09 Aug 2018 13:25) (16 - 17)  SpO2: 97% (09 Aug 2018 13:25) (97% - 99%)    Physical Exam:  General: Lethargic, well-developed, NAD  HEENT: NCAT, PERRLA, EOMI bl, moist mucous membranes   Neck: Supple, nontender, no mass  Neurology: AAOX0, difficult to arouse but responsive to pain.  Respiratory: CTA B/L, No W/R/R  CV: RRR, +S1/S2, no murmurs, rubs or gallops  Abdominal: Soft, NT, ND +BSx4  Extremities: No C/C/E, + peripheral pulses  Skin: warm, dry, normal color, no rash or abnormal lesions      Assessment/Plan:  Patient is an 85 year old female admitted for L heel osteomyelitis. RRT called for increased lethargy, likely secondary to sedating medication given in the afternoon.     1) Change Mirtazepine 7.5 mg from daily to QHS  Family informed.  Attending physician Dr. Clements made aware    -Will continue to follow, RN to call if any changes.

## 2018-08-09 NOTE — DISCHARGE NOTE ADULT - PLAN OF CARE
Controlled Continue home medication Azilect and Mirtazapine as prescribed Continue home dose of Synthroid You were diagnosed with a bone infection of the L heel complicated by your ulcer. You received a PICC line (Peripherally inserted central catheter) to receive IV antibiotics as they work well for bone infections. Follow up with Infectious Disease () Continue home dose atenolol Continue home dose of Xarelto. Continue home medication Carbadopa/Levodopa at recommended dose (25/100) Continue home dose atenolol/ low salt diet rate controlled Controlled stable You were diagnosed with a bone infection of the L heel complicated by your ulcer. You received a PICC line (Peripherally inserted central catheter) to receive IV antibiotics as they work well for bone infections. Follow up with Infectious Disease / total 6wk abx  cipro and vanco fu trough closely You were diagnosed with a bone infection of the L heel complicated by your ulcer. You received a PICC line (Peripherally inserted central catheter) to receive IV antibiotics as they work well for bone infections. Follow up with Infectious Disease / total 6wk abx  cipro and vanco until 9/21/18 fu trough closely

## 2018-08-09 NOTE — PROGRESS NOTE ADULT - PROBLEM SELECTOR PLAN 1
Acute OM  6 weeks total Rx  vanc trough 16 hold todays dose  check trough in am  montor creatinine  Continue Zosyn  Redose Vancomycin if tomorrows level is <= 15  PICC and likely MEHUL once otherwise stabilized  Local care per podiatry.  IV vancomycin plus oral ciprofloxacin may be a viable option here

## 2018-08-09 NOTE — PROGRESS NOTE ADULT - PROBLEM SELECTOR PLAN 1
-Results of L bone Bx (8/3) showed acute L osteomyelitis  -Continue to Hold Vanc because last trough 16.6 this am. Holding for > 15 as per ID recc. Will discuss with ID. Trough tomorrow am.  -Continue Zosyn 3.375g IV Q8H as per ID rec  -Spoke with Family yesterday. Will receive PICC line today or tomorrow as per IR      -Podiatry Dr. Vargas, performed bone biopsy at bedside   -Continue IV Vancomycin 1000 mg Q12  dc as high trough   -Continue to follow Vanc trough (16.4 8/1/18), monitor renal function   -Continue 3.375 Zosyn mg IV Q8H   -Continue collagenese ointment in bilateral heels  -Continue off-loading of heels bilaterally  -Dr. Álvarez, wound care recommendations appreciated. Continue Collagenase, protective dressing  -D/C vanc as trough level elevated (37)  -F/U ID / resume vanc as per trough -Results of L bone Bx (8/3) showed acute L osteomyelitis  -Continue to Hold Vanc because last trough 16.6 this am. Holding for > 15 as per ID recc. Will discuss with ID. Trough tomorrow am.  -Continue Zosyn 3.375g IV Q8H as per ID rec  -Spoke with Family yesterday. Will receive PICC line today or tomorrow as per IR      -Podiatry Dr. Vargas, performed bone biopsy at bedside   -Continue IV Vancomycin 1000 mg Q12  dc as high trough   -Continue to follow Vanc trough (16.4 8/1/18), monitor renal function   -Continue 3.375 Zosyn mg IV Q8H   -Continue collagenese ointment in bilateral heels  -Continue off-loading of heels bilaterally  -Dr. Álvarez, wound care recommendations appreciated. Continue Collagenase, protective dressing  -D/C vanc as trough level elevated (37)  -F/U ID / resume vanc as per trough in am

## 2018-08-09 NOTE — PROGRESS NOTE ADULT - ATTENDING COMMENTS
pt seen and examine see above - Patient is an 85 year old female multiple medical problems bed  to wheel chair bound , admitted for cellulitis of both heels with stage 3 pressure ulcers and cellulitis, suspect left heel acute Osteomyelitis  s/p biopsy  by podiatry at bedside Pathology report  showed acute om  lt heel  tissue cult + pseudomonas and mrsa  - iv abx zosyne   vanco trough in am for further vanc dose     ,   mario  with hydration improved .   hypernatremia  sec to dehydration  resolved after fluid   .  picc line today with family consent / no surgical intervention as per family.   chr afib hold full ac for picc line  than resume  .  dc with picc line reynaldo plan .

## 2018-08-09 NOTE — PROGRESS NOTE ADULT - ASSESSMENT
·	LORENA, CKD 3  ·	Hypertension  ·	Anemia    Improving creatinine trend. To continue current meds. Monitor h/h trend. Monitor BP trend.   Titrate BP meds as needed. Salt restriction. Will follow electrolytes and renal function trend.

## 2018-08-09 NOTE — DISCHARGE NOTE ADULT - PROVIDER TOKENS
TOKEN:'[id number]',TOKEN:'[id number]' TOKEN:'[id number]:'[id number]

## 2018-08-09 NOTE — DISCHARGE NOTE ADULT - HOSPITAL COURSE
85F with PMH HTN, a fib on Xarelto, Parkinson's Dx, Hx of CVA, bedbound presents from [facility] for evaluation of bilateral heel wounds, being treated by wound care center in [place]. Pt. admitted for bilateral heel cellulitis possibly complicated by osteomyelitis. Chest X-ray (7/31) in ED negative. X-ray L foot (7/31) showed L heel ulcer. Patient put on IV antibiotics covering MRSA. Podiatry consulted, Infectious disease consulted. MRI of R heel (8/1) showed no signs of osteomyelitis. MRI of L heel with IV contrast (8/2) showed acute osteomyelitis. Patient continued to have treatment with antibiotics and PICC line in R forearm inserted 8/9/18 for outpatient antibiotics. Goals of care discussed with family. Patient DNR/DNI, no TPN with MOLST form completed on 7/31/18.       Physical Exam:  Gen: NAD, well-developed  Neck: No lymphadenopathy  Lungs: CTA b/l no w/r/r  Heart: +S1/+S2, no m/r/g  Abd: Soft, NT, ND, +BSx4  Extremities: 2+ dorsalis pedal pulses b/l. Bandages wrapped on feet b/l. PICC line inserted in R forearm. No erythema, edema. 2+ Radial pulses b/l 85F with PMH HTN, a fib on Xarelto, Parkinson's Dx, Hx of CVA, bedbound presents from [facility] for evaluation of bilateral heel wounds, being treated by wound care center in [place]. Pt. admitted for bilateral heel cellulitis possibly complicated by osteomyelitis. Chest X-ray (7/31) in ED negative. X-ray L foot (7/31) showed L heel ulcer. Patient put on IV antibiotics covering MRSA. Podiatry consulted, Infectious disease consulted. MRI of R heel (8/1) showed no signs of osteomyelitis. MRI of L heel with IV contrast (8/2) showed acute osteomyelitis.   hosp course cellulitis of both heels with stage 3 pressure ulcers and cellulitis, s/p biopsy  by podiatry at bedside Pathology report  showed acute om  lt heel  tissue cult + pseudomonas and mrsa  - iv abx zosyne   vanco trough in am for further vanc dose     ,   mario  with hydration improved .   hypernatremia  sec to dehydration  resolved after fluid   .  picc line today with family consent / no surgical intervention as per family.   chr afib hold full ac for picc line  than resumed it  .    . Patient continued to have treatment with antibiotics and PICC line in R forearm inserted  need total 6 wk abx .  DNR/DNI . family aware reynaldo plan . 85F with PMH HTN, a fib on Xarelto, Parkinson's Dx, Hx of CVA, bedbound presents from [facility] for evaluation of bilateral heel wounds, being treated by wound care center in [place]. Pt. admitted for bilateral heel cellulitis possibly complicated by osteomyelitis. Chest X-ray (7/31) in ED negative. X-ray L foot (7/31) showed L heel ulcer. Patient put on IV antibiotics covering MRSA. Podiatry consulted, Infectious disease consulted. MRI of R heel (8/1) showed no signs of osteomyelitis. MRI of L heel with IV contrast (8/2) showed acute osteomyelitis. Patient had biopsy by podiatry at bedside. Pathology report showed acute osteomyelitis of Left heel. Tissue culture was positive for pseudomonas and mrsa. Patient had LORENA which improved. Patient had picc line placed on 8/10 with family consent. Patient to complete a 6 week course of Vancomycin and Cipro via PICC line in R forearm until 9/21. Patient is DNR/DNI. Patient to be discharged to [facility]. Family agreeable to plan.     Patient medically optimized and hemodynamically stable for discharge to [facility].

## 2018-08-09 NOTE — DISCHARGE NOTE ADULT - ADDITIONAL INSTRUCTIONS
Chart(s)  PCP: Dr. Gaye grajeda with in 1wk . Wound care instructions:  1. cleanse wounds with normal saline, pat dry  2. apply nickel-thickness amount of santyl to wounds  3. dress with dry, sterile dressing with materials such as 4x4 gauze, abd padding jeffery and ace  4. change daily and monitor for signs of infection such as redness, swelling, and purulent drainage  please have patient follow up with Dr. Vargas in wound care clinic within 1 week of discharge.     PCP: Dr. Gaye grajeda with in 1wk .

## 2018-08-09 NOTE — DISCHARGE NOTE ADULT - PATIENT PORTAL LINK FT
You can access the [facility] Patient Portal, offered by [facility], by registering with the following website: [email or web address]

## 2018-08-10 LAB
ANION GAP SERPL CALC-SCNC: 9 MMOL/L — SIGNIFICANT CHANGE UP (ref 5–17)
BUN SERPL-MCNC: 14 MG/DL — SIGNIFICANT CHANGE UP (ref 7–23)
CALCIUM SERPL-MCNC: 8.7 MG/DL — SIGNIFICANT CHANGE UP (ref 8.5–10.1)
CHLORIDE SERPL-SCNC: 108 MMOL/L — SIGNIFICANT CHANGE UP (ref 96–108)
CO2 SERPL-SCNC: 27 MMOL/L — SIGNIFICANT CHANGE UP (ref 22–31)
CREAT SERPL-MCNC: 1.2 MG/DL — SIGNIFICANT CHANGE UP (ref 0.5–1.3)
GLUCOSE SERPL-MCNC: 83 MG/DL — SIGNIFICANT CHANGE UP (ref 70–99)
HCT VFR BLD CALC: 29.6 % — LOW (ref 34.5–45)
HGB BLD-MCNC: 9.8 G/DL — LOW (ref 11.5–15.5)
MCHC RBC-ENTMCNC: 29.3 PG — SIGNIFICANT CHANGE UP (ref 27–34)
MCHC RBC-ENTMCNC: 33.1 GM/DL — SIGNIFICANT CHANGE UP (ref 32–36)
MCV RBC AUTO: 88.4 FL — SIGNIFICANT CHANGE UP (ref 80–100)
NRBC # BLD: 0 /100 WBCS — SIGNIFICANT CHANGE UP (ref 0–0)
PLATELET # BLD AUTO: 369 K/UL — SIGNIFICANT CHANGE UP (ref 150–400)
POTASSIUM SERPL-MCNC: 3.5 MMOL/L — SIGNIFICANT CHANGE UP (ref 3.5–5.3)
POTASSIUM SERPL-SCNC: 3.5 MMOL/L — SIGNIFICANT CHANGE UP (ref 3.5–5.3)
RBC # BLD: 3.35 M/UL — LOW (ref 3.8–5.2)
RBC # FLD: 15.3 % — HIGH (ref 10.3–14.5)
SODIUM SERPL-SCNC: 144 MMOL/L — SIGNIFICANT CHANGE UP (ref 135–145)
VANCOMYCIN TROUGH SERPL-MCNC: 13.9 UG/ML — SIGNIFICANT CHANGE UP (ref 10–20)
WBC # BLD: 7.91 K/UL — SIGNIFICANT CHANGE UP (ref 3.8–10.5)
WBC # FLD AUTO: 7.91 K/UL — SIGNIFICANT CHANGE UP (ref 3.8–10.5)

## 2018-08-10 PROCEDURE — 36569 INSJ PICC 5 YR+ W/O IMAGING: CPT

## 2018-08-10 PROCEDURE — 77001 FLUOROGUIDE FOR VEIN DEVICE: CPT | Mod: 26

## 2018-08-10 PROCEDURE — 99233 SBSQ HOSP IP/OBS HIGH 50: CPT | Mod: GC

## 2018-08-10 PROCEDURE — 76937 US GUIDE VASCULAR ACCESS: CPT | Mod: 26

## 2018-08-10 RX ORDER — CIPROFLOXACIN LACTATE 400MG/40ML
1 VIAL (ML) INTRAVENOUS
Qty: 0 | Refills: 0 | DISCHARGE
Start: 2018-08-10

## 2018-08-10 RX ORDER — RIVAROXABAN 15 MG-20MG
15 KIT ORAL EVERY 24 HOURS
Refills: 0 | Status: DISCONTINUED | OUTPATIENT
Start: 2018-08-10 | End: 2018-08-13

## 2018-08-10 RX ORDER — VANCOMYCIN HCL 1 G
VIAL (EA) INTRAVENOUS
Refills: 0 | Status: DISCONTINUED | OUTPATIENT
Start: 2018-08-10 | End: 2018-08-10

## 2018-08-10 RX ORDER — VANCOMYCIN HCL 1 G
1000 VIAL (EA) INTRAVENOUS EVERY 12 HOURS
Refills: 0 | Status: DISCONTINUED | OUTPATIENT
Start: 2018-08-10 | End: 2018-08-10

## 2018-08-10 RX ORDER — VANCOMYCIN HCL 1 G
1 VIAL (EA) INTRAVENOUS
Qty: 0 | Refills: 0 | DISCHARGE
Start: 2018-08-10

## 2018-08-10 RX ORDER — VANCOMYCIN HCL 1 G
1000 VIAL (EA) INTRAVENOUS
Refills: 0 | Status: DISCONTINUED | OUTPATIENT
Start: 2018-08-11 | End: 2018-08-13

## 2018-08-10 RX ORDER — VANCOMYCIN HCL 1 G
1000 VIAL (EA) INTRAVENOUS ONCE
Refills: 0 | Status: COMPLETED | OUTPATIENT
Start: 2018-08-10 | End: 2018-08-10

## 2018-08-10 RX ORDER — COLLAGENASE CLOSTRIDIUM HIST. 250 UNIT/G
1 OINTMENT (GRAM) TOPICAL
Qty: 0 | Refills: 0 | DISCHARGE
Start: 2018-08-10

## 2018-08-10 RX ORDER — CIPROFLOXACIN LACTATE 400MG/40ML
500 VIAL (ML) INTRAVENOUS EVERY 12 HOURS
Refills: 0 | Status: DISCONTINUED | OUTPATIENT
Start: 2018-08-10 | End: 2018-08-13

## 2018-08-10 RX ADMIN — RIVAROXABAN 15 MILLIGRAM(S): KIT at 17:41

## 2018-08-10 RX ADMIN — PIPERACILLIN AND TAZOBACTAM 25 GRAM(S): 4; .5 INJECTION, POWDER, LYOPHILIZED, FOR SOLUTION INTRAVENOUS at 06:07

## 2018-08-10 RX ADMIN — CARBIDOPA AND LEVODOPA 1 TABLET(S): 25; 100 TABLET ORAL at 22:13

## 2018-08-10 RX ADMIN — MIRTAZAPINE 7.5 MILLIGRAM(S): 45 TABLET, ORALLY DISINTEGRATING ORAL at 22:13

## 2018-08-10 RX ADMIN — SIMVASTATIN 10 MILLIGRAM(S): 20 TABLET, FILM COATED ORAL at 22:13

## 2018-08-10 RX ADMIN — Medication 1 APPLICATION(S): at 12:23

## 2018-08-10 RX ADMIN — Medication 500 MILLIGRAM(S): at 17:41

## 2018-08-10 RX ADMIN — RASAGILINE 0.5 MILLIGRAM(S): 0.5 TABLET ORAL at 12:23

## 2018-08-10 RX ADMIN — CARBIDOPA AND LEVODOPA 1 TABLET(S): 25; 100 TABLET ORAL at 17:41

## 2018-08-10 RX ADMIN — Medication 25 MICROGRAM(S): at 06:07

## 2018-08-10 RX ADMIN — CARBIDOPA AND LEVODOPA 1 TABLET(S): 25; 100 TABLET ORAL at 06:07

## 2018-08-10 RX ADMIN — Medication 250 MILLIGRAM(S): at 12:22

## 2018-08-10 RX ADMIN — Medication 500 MILLIGRAM(S): at 12:23

## 2018-08-10 NOTE — CHART NOTE - NSCHARTNOTEFT_GEN_A_CORE
Assessment:     Factors impacting intake: [ ] none [ ] nausea  [ ] vomiting [ ] diarrhea [ ] constipation  [ ]chewing problems [ ] swallowing issues  [ ] other:     Diet Presciption: Diet, DASH/TLC:   Sodium & Cholesterol Restricted (07-31-18 @ 13:23)    Intake:     Current Weight:   % Weight Change    Pertinent Medications: MEDICATIONS  (STANDING):  ATENolol  Tablet 25 milliGRAM(s) Oral daily  carbidopa/levodopa  25/100 1 Tablet(s) Oral three times a day  ciprofloxacin     Tablet 500 milliGRAM(s) Oral every 12 hours  collagenase Ointment 1 Application(s) Topical daily  levothyroxine 25 MICROGram(s) Oral daily  mirtazapine 7.5 milliGRAM(s) Oral at bedtime  piperacillin/tazobactam IVPB. 3.375 Gram(s) IV Intermittent every 8 hours  rasagiline Tablet 0.5 milliGRAM(s) Oral daily  simvastatin 10 milliGRAM(s) Oral at bedtime    MEDICATIONS  (PRN):  acetaminophen   Tablet 650 milliGRAM(s) Oral every 6 hours PRN For Temp greater than 38 C (100.4 F)  acetaminophen   Tablet. 650 milliGRAM(s) Oral every 6 hours PRN Moderate Pain (4 - 6)    Pertinent Labs: 08-10 Na144 mmol/L Glu 83 mg/dL K+ 3.5 mmol/L Cr  1.20 mg/dL BUN 14 mg/dL     CAPILLARY BLOOD GLUCOSE      POCT Blood Glucose.: 110 mg/dL (09 Aug 2018 17:34)    Skin:     Estimated Needs:   [ ] no change since previous assessment  [ ] recalculated:     Previous Nutrition Diagnosis:   [ ] Inadequate Energy Intake [ ]Inadequate Oral Intake [ ] Excessive Energy Intake   [ ] Underweight [ ] Increased Nutrient Needs [ ] Overweight/Obesity   [ ] Altered GI Function [ ] Unintended Weight Loss [ ] Food & Nutrition Related Knowledge Deficit [ ] Malnutrition     Nutrition Diagnosis is [ ] ongoing  [ ] resolved [ ] not applicable     New Nutrition Diagnosis: [ ] not applicable       Interventions:   Recommend  [ ] Change Diet To:  [ ] Nutrition Supplement  [ ] Nutrition Support  [ ] Other:     Monitoring and Evaluation:   [ ] PO intake [ x ] Tolerance to diet prescription [ x ] weights [ x ] labs[ x ] follow up per protocol  [ ] other: Assessment: 85F with PMH HTN, a fib on Xarelto, Parkinson's Dx, Hx of CVA, bedbound being treated for bilat helle cellulitis/osteomyelitis, had PICC placed today, possible d/c to [facility] tomorrow . Per nursing, has not been fed lunch yet, eating ~  50% of meals overall      Factors impacting intake: [ ] none [ ] nausea  [ ] vomiting [ ] diarrhea [ ] constipation  [ ]chewing problems [ ] swallowing issues  [X ] other: difficulty feeding self    Diet Prescription: Diet, DASH/TLC:   Sodium & Cholesterol Restricted (07-31-18 @ 13:23)    Intake: 50%    Current Weight:   % Weight Change    Pertinent Medications: MEDICATIONS  (STANDING):  ATENolol  Tablet 25 milliGRAM(s) Oral daily  carbidopa/levodopa  25/100 1 Tablet(s) Oral three times a day  ciprofloxacin     Tablet 500 milliGRAM(s) Oral every 12 hours  collagenase Ointment 1 Application(s) Topical daily  levothyroxine 25 MICROGram(s) Oral daily  mirtazapine 7.5 milliGRAM(s) Oral at bedtime  piperacillin/tazobactam IVPB. 3.375 Gram(s) IV Intermittent every 8 hours  rasagiline Tablet 0.5 milliGRAM(s) Oral daily  simvastatin 10 milliGRAM(s) Oral at bedtime    MEDICATIONS  (PRN):  acetaminophen   Tablet 650 milliGRAM(s) Oral every 6 hours PRN For Temp greater than 38 C (100.4 F)  acetaminophen   Tablet. 650 milliGRAM(s) Oral every 6 hours PRN Moderate Pain (4 - 6)    Pertinent Labs: 08-10 Na144 mmol/L Glu 83 mg/dL K+ 3.5 mmol/L Cr  1.20 mg/dL BUN 14 mg/dL     CAPILLARY BLOOD GLUCOSE      POCT Blood Glucose.: 110 mg/dL (09 Aug 2018 17:34)    Skin:     Estimated Needs:   [ ] no change since previous assessment  [ ] recalculated:     Previous Nutrition Diagnosis:   [ ] Inadequate Energy Intake [ ]Inadequate Oral Intake [ ] Excessive Energy Intake   [ ] Underweight [ ] Increased Nutrient Needs [ ] Overweight/Obesity   [ ] Altered GI Function [ ] Unintended Weight Loss [ ] Food & Nutrition Related Knowledge Deficit [ ] Malnutrition     Nutrition Diagnosis is [ ] ongoing  [ ] resolved [ ] not applicable     New Nutrition Diagnosis: [ ] not applicable       Interventions:   Recommend  [ ] Change Diet To:  [ ] Nutrition Supplement  [ ] Nutrition Support  [ ] Other:     Monitoring and Evaluation:   [ ] PO intake [ x ] Tolerance to diet prescription [ x ] weights [ x ] labs[ x ] follow up per protocol  [ ] other: Assessment: 85F with PMH HTN, a fib on Xarelto, Parkinson's Dx, Hx of CVA, bedbound being treated for bilat helle cellulitis/osteomyelitis, had PICC placed today, possible d/c to [facility] tomorrow . Per nursing, has not been fed lunch yet, eating ~  50% of meals overall      Factors impacting intake: [ ] none [ ] nausea  [ ] vomiting [ ] diarrhea [ ] constipation  [ ]chewing problems [ ] swallowing issues  [X ] other: difficulty feeding self    Diet Prescription: Diet, DASH/TLC:   Sodium & Cholesterol Restricted (07-31-18 @ 13:23)    Intake: 50%    Current Weight: ( 8-8-18) 169.3#  % Weight Change: ^ 1.6# over the last week + 1%, questionable accuracy,      Pertinent Medications: MEDICATIONS  (STANDING):  ATENolol  Tablet 25 milliGRAM(s) Oral daily  carbidopa/levodopa  25/100 1 Tablet(s) Oral three times a day  ciprofloxacin     Tablet 500 milliGRAM(s) Oral every 12 hours  collagenase Ointment 1 Application(s) Topical daily  levothyroxine 25 MICROGram(s) Oral daily  mirtazapine 7.5 milliGRAM(s) Oral at bedtime  piperacillin/tazobactam IVPB. 3.375 Gram(s) IV Intermittent every 8 hours  rasagiline Tablet 0.5 milliGRAM(s) Oral daily  simvastatin 10 milliGRAM(s) Oral at bedtime    MEDICATIONS  (PRN):  acetaminophen   Tablet 650 milliGRAM(s) Oral every 6 hours PRN For Temp greater than 38 C (100.4 F)  acetaminophen   Tablet. 650 milliGRAM(s) Oral every 6 hours PRN Moderate Pain (4 - 6)    Pertinent Labs: 08-10 Na144 mmol/L Glu 83 mg/dL K+ 3.5 mmol/L Cr  1.20 mg/dL BUN 14 mg/dL     CAPILLARY BLOOD GLUCOSE      POCT Blood Glucose.: 110 mg/dL (09 Aug 2018 17:34)    Skin: Sacrum II, right heel unstageable , left heel unstageable , right ankle unstageable , left buttocks unstageable, right lateral foot DTI, right dorsal heel DTI.         Estimated Needs:   [X ] no change since previous assessment  [ ] recalculated:     Previous Nutrition Diagnosis:   [ ] Inadequate Energy Intake [ ]Inadequate Oral Intake [ ] Excessive Energy Intake   [ ] Underweight [ ] Increased Nutrient Needs [ ] Overweight/Obesity   [ ] Altered GI Function [ ] Unintended Weight Loss [ ] Food & Nutrition Related Knowledge Deficit [ ] Malnutrition     Nutrition Diagnosis is [ ] ongoing  [ ] resolved [ ] not applicable     New Nutrition Diagnosis: [ ] not applicable       Interventions:   Recommend  [ ] Change Diet To:  [ ] Nutrition Supplement  [ ] Nutrition Support  [ ] Other:     Monitoring and Evaluation:   [ ] PO intake [ x ] Tolerance to diet prescription [ x ] weights [ x ] labs[ x ] follow up per protocol  [ ] other: Assessment: 85F with PMH HTN, a fib on Xarelto, Parkinson's Dx, Hx of CVA, bedbound being treated for bilat helle cellulitis/osteomyelitis, had PICC placed today, possible d/c to [facility] tomorrow . Per nursing, has not been fed lunch yet, eating ~  50% of meals overall      Factors impacting intake: [ ] none [ ] nausea  [ ] vomiting [ ] diarrhea [ ] constipation  [ ]chewing problems [ ] swallowing issues  [X ] other: difficulty feeding self    Diet Prescription: Diet, DASH/TLC:   Sodium & Cholesterol Restricted (07-31-18 @ 13:23)    Intake: 50%    Current Weight: ( 8-8-18) 169.3#  % Weight Change: ^ 1.6# over the last week + 1%, questionable accuracy,      Pertinent Medications: MEDICATIONS  (STANDING):  ATENolol  Tablet 25 milliGRAM(s) Oral daily  carbidopa/levodopa  25/100 1 Tablet(s) Oral three times a day  ciprofloxacin     Tablet 500 milliGRAM(s) Oral every 12 hours  collagenase Ointment 1 Application(s) Topical daily  levothyroxine 25 MICROGram(s) Oral daily  mirtazapine 7.5 milliGRAM(s) Oral at bedtime  piperacillin/tazobactam IVPB. 3.375 Gram(s) IV Intermittent every 8 hours  rasagiline Tablet 0.5 milliGRAM(s) Oral daily  simvastatin 10 milliGRAM(s) Oral at bedtime    MEDICATIONS  (PRN):  acetaminophen   Tablet 650 milliGRAM(s) Oral every 6 hours PRN For Temp greater than 38 C (100.4 F)  acetaminophen   Tablet. 650 milliGRAM(s) Oral every 6 hours PRN Moderate Pain (4 - 6)    Pertinent Labs: 08-10 Na144 mmol/L Glu 83 mg/dL K+ 3.5 mmol/L Cr  1.20 mg/dL BUN 14 mg/dL     CAPILLARY BLOOD GLUCOSE      POCT Blood Glucose.: 110 mg/dL (09 Aug 2018 17:34)    Skin: Sacrum II, right heel unstageable , left heel unstageable , right ankle unstageable , left buttocks unstageable, right lateral foot DTI, right dorsal heel DTI.         Estimated Needs:   [X ] no change since previous assessment  [ ] recalculated:     Previous Nutrition Diagnosis:   [ ] Inadequate Energy Intake [ ]Inadequate Oral Intake [ ] Excessive Energy Intake   [ ] Underweight [ ] Increased Nutrient Needs [ ] Overweight/Obesity   [ ] Altered GI Function [ ] Unintended Weight Loss [ ] Food & Nutrition Related Knowledge Deficit [ X] Malnutrition     Nutrition Diagnosis is [X ] ongoing  [ ] resolved [ ] not applicable     New Nutrition Diagnosis: [ ] not applicable       Interventions:   Recommend  [ ] Change Diet To:  [ ] Nutrition Supplement  [ ] Nutrition Support  [X ] Other: attempt to get in touch with Dr. Clements to accept orders PV - vit c, mvi, Skinny and Ensure supplements    Monitoring and Evaluation:   [x ] PO intake [ x ] Tolerance to diet prescription [ x ] weights [ x ] labs[ x ] follow up per protocol  [ ] other:

## 2018-08-10 NOTE — PROGRESS NOTE ADULT - SUBJECTIVE AND OBJECTIVE BOX
[facility], Division of Infectious Diseases  CHIDI Nava A. Lee  [phone]    Name: MIGUEL LORD  Age: 85y  Gender: Female  MRN: [id number]    Interval History--  Notes reviewed. Poorly arousable.    Past Medical History--  Stroke  Hypothyroidism  Hyperlipidemia  Parkinson disease  Afib  Hypertension  Dementia  H/O total knee replacement, right      For details regarding the patient's social history, family history, and other miscellaneous elements, please refer the initial infectious diseases consultation and/or the admitting history and physical examination for this admission.    Allergies    No Known Allergies    Intolerances        Medications--  Antibiotics:  piperacillin/tazobactam IVPB. 3.375 Gram(s) IV Intermittent every 8 hours    Immunologic:    Other:  acetaminophen   Tablet PRN  acetaminophen   Tablet. PRN  ATENolol  Tablet  carbidopa/levodopa  25/100  collagenase Ointment  levothyroxine  mirtazapine  rasagiline Tablet  simvastatin      Review of Systems--  Review of systems unable due to dementia.     Physical Examination--  Vital Signs: T(F): 99.2 (08-10-18 @ 05:21), Max: 99.2 (08-10-18 @ 05:21)  HR: 88 (08-10-18 @ 05:21)  BP: 104/64 (08-10-18 @ 05:21)  RR: 16 (08-10-18 @ 05:21)  SpO2: 96% (08-10-18 @ 05:21)  Wt(kg): --  General: Nontoxic-appearing Female in no acute distress.   HEENT: AT/NC. Pupils/EOM unable secondary to patient compliance. Oropharynx/dentition unable secondary to patient compliance.   Neck: Increased tone, not frankly rigid  Nodes: None palpable.  Lungs: Poor effort, no rales, wheezing or rhonchi  Heart: Regular rate and rhythm, poor compliance with exam.  Abdomen: Bowel sounds present and normoactive. Soft. Nondistended. Nontender. No sense of mass. No organomegaly.  Extremities: No cyanosis or clubbing. 1+ LE edema. Feet dressed.  Skin: Warm. Dry. Good turgor. No rash. No vasculitic stigmata.  Psychiatric: Unable.         Laboratory Studies--  CBC                        9.8    7.91  )-----------( 369      ( 10 Aug 2018 06:12 )             29.6       Chemistries  08-10    144  |  108  |  14  ----------------------------<  83  3.5   |  27  |  1.20    Ca    8.7      10 Aug 2018 06:12        Culture Data    Culture - Tissue with Gram Stain (collected 03 Aug 2018 16:46)  Source: .Tissue Other, left heel  Gram Stain (03 Aug 2018 22:46):    No polymorphonuclear cells seen    No organisms seen  Final Report (08 Aug 2018 18:32):    Rare Enterobacter cloacae/asburiae    Growth in fluid media only Methicillin resistant Staphylococcus aureus    Rare Corynebacterium species  Organism: Enterobacter cloacae/absuria  Methicillin resistant Staphylococcus aureus (08 Aug 2018 18:32)  Organism: Methicillin resistant Staphylococcus aureus (08 Aug 2018 18:32)  Organism: Enterobacter cloacae/absuria (08 Aug 2018 18:32)    Culture - Tissue with Gram Stain (collected 03 Aug 2018 16:34)  Source: .Tissue Other, left heel  Gram Stain (06 Aug 2018 20:52):    Upon re-evaluation of gram stain:    No polymorphonuclear cells seen    Moderate Gram Negative Rods per oil power field    Few Gram Positive Cocci in Clusters per oil power field  Final Report (08 Aug 2018 18:24):    Moderate Pseudomonas aeruginosa (Carbapenem Resistant)    Moderate Methicillin resistant Staphylococcus aureus    Few Escherichia coli    Numerous Corynebacterium species    "Susceptibilities not performed"  Organism: Pseudomonas aeruginosa (Carbapenem Resistant)  Methicillin resistant Staphylococcus aureus  Escherichia coli (08 Aug 2018 18:24)  Organism: Escherichia coli (08 Aug 2018 18:24)  Organism: Methicillin resistant Staphylococcus aureus (08 Aug 2018 18:24)  Organism: Pseudomonas aeruginosa (Carbapenem Resistant) (08 Aug 2018 18:24)

## 2018-08-10 NOTE — PROGRESS NOTE ADULT - PROBLEM SELECTOR PLAN 1
Acute OM  6 weeks total Rx  Continue Zosyn  Resome vancomycin at 1g IVPB daily, recheck trough before 3rd dose  PICC and likely MEHUL once otherwise stabilized  Local care per podiatry.  IV vancomycin plus oral ciprofloxacin may be a viable option here

## 2018-08-10 NOTE — PROGRESS NOTE ADULT - SUBJECTIVE AND OBJECTIVE BOX
MIGUEL LORD  85y  Female    Patient is a 85y old  Female who presents with a chief complaint of Bilateral heel cellulitis (09 Aug 2018 15:07)    comfortable.     PAST MEDICAL & SURGICAL HISTORY:  Stroke  Hypothyroidism  Hyperlipidemia  Parkinson disease  Afib  Hypertension  Dementia  H/O total knee replacement, right    PHYSICAL EXAM:    T(C): 37.3 (08-10-18 @ 05:21), Max: 37.3 (08-10-18 @ 05:21)  HR: 69 (08-10-18 @ 10:38) (69 - 101)  BP: 90/67 (08-10-18 @ 10:38) (90/67 - 136/79)  RR: 16 (08-10-18 @ 05:21) (16 - 18)  SpO2: 96% (08-10-18 @ 05:21) (96% - 98%)  Wt(kg): --    I&O's Detail      Respiratory: clear anteriorly, decreased BS at bases  Cardiovascular: S1 S2  Gastrointestinal: soft NT ND +BS  Extremities: trace edema   Neuro: Awake and alert    MEDICATIONS  (STANDING):  ATENolol  Tablet 25 milliGRAM(s) Oral daily  carbidopa/levodopa  25/100 1 Tablet(s) Oral three times a day  ciprofloxacin     Tablet 500 milliGRAM(s) Oral every 12 hours  collagenase Ointment 1 Application(s) Topical daily  levothyroxine 25 MICROGram(s) Oral daily  mirtazapine 7.5 milliGRAM(s) Oral at bedtime  piperacillin/tazobactam IVPB. 3.375 Gram(s) IV Intermittent every 8 hours  rasagiline Tablet 0.5 milliGRAM(s) Oral daily  simvastatin 10 milliGRAM(s) Oral at bedtime    MEDICATIONS  (PRN):  acetaminophen   Tablet 650 milliGRAM(s) Oral every 6 hours PRN For Temp greater than 38 C (100.4 F)  acetaminophen   Tablet. 650 milliGRAM(s) Oral every 6 hours PRN Moderate Pain (4 - 6)                            9.8    7.91  )-----------( 369      ( 10 Aug 2018 06:12 )             29.6       08-10    144  |  108  |  14  ----------------------------<  83  3.5   |  27  |  1.20    Ca    8.7      10 Aug 2018 06:12    < from: US Renal (08.08.18 @ 08:36) >  EXAM:  US KIDNEY(S)                            PROCEDURE DATE:  08/08/2018          INTERPRETATION:  .    CLINICAL INFORMATION: Acute kidney injury. Renal failure.    TECHNIQUE: Realtime ultrasound of the kidneys were performed. Color   images werealso obtained.    COMPARISON: None available.      FINDINGS: The kidneys are of normal size and echogenicity bilaterally.    There is no evidence for hydronephrosis and no focal lesions were   identified.  The right kidney measures 9.8 and the left kidney measures   10.7.    The provided grayscale images of the urinary bladder appear unremarkable.    The imaged portions of the aorta and IVC are unremarkable.    IMPRESSION: No hydronephrosis.

## 2018-08-10 NOTE — CHART NOTE - NSCHARTNOTEFT_GEN_A_CORE
Wound care instructions  1. cleanse wounds with normal saline, pat dry  2. apply nickel-thickness amount of santyl to wounds  3. dress with dry, sterile dressing with materials such as 4x4 gauze, abd padding jeffery and ace  4. change daily and monitor for signs of infection such as redness, swelling, and purulent drainage      Alternative wound care instructions  1. cleanse wound with normal saline, pat dry  2. apply wet to dry dressing with normal saline soaked 4x4 gauze and dress with abd padding and secure with jeffery and ace  3. change daily and monitor for signs of infection such as redness, swelling, and purulent drainage    please have patient follow up with Dr. Vargas in wound care clinic within 1 week of discharge.

## 2018-08-10 NOTE — PROGRESS NOTE ADULT - PROBLEM SELECTOR PLAN 5
chronic, stable  -Rate controlled on Atenolol 25 mg PO daily  -Anticoagulated with 15 mg Xarelto Q24H  hold for picc resume in am chronic, stable  -Rate controlled on Atenolol 25 mg PO daily  -Anticoagulated with 15 mg Xarelto Q24H

## 2018-08-10 NOTE — PROGRESS NOTE ADULT - ASSESSMENT
85 female with a history of Dementia, HTN, CAD, AF, HLD, Parkinson's disease now with sepsis and is on cipro and zosyn.   osteomyelitis of the heel and has PICC line to start on Vancomycin. Renal indices are close to baseline now but need to be monitored very closely.   Will follow closely.

## 2018-08-10 NOTE — PROGRESS NOTE ADULT - PROBLEM SELECTOR PLAN 1
-Results of L bone Bx (8/3) showed acute L osteomyelitis  -Resuming Vancomycin QD as per ID rec's. Vanc trough 13.6. Continue Vanc trough pre-3rd dose  -Monitior Kidney Function   -Start Ciprofloxacin 500 mg Q12H as per ID rec's to cover pseudomonas   -D/C Zosyn  -PICC line today. Family aware and agreed. Most likely DC to [place] tomorrow.  Podiatry Dr. Vargas, performed bone biopsy at bedside   -Continue collagenese ointment in bilateral heels  -Continue off-loading of heels bilaterally  -Dr. Álvarez, wound care recommendations appreciated. Continue Collagenase, protective dressing

## 2018-08-10 NOTE — PROGRESS NOTE ADULT - ATTENDING COMMENTS
pt seen and examine see above - Patient is an 85 year old female multiple medical problems bed  to wheel chair bound , admitted for cellulitis of both heels with stage 3 pressure ulcers and cellulitis, suspect left heel acute Osteomyelitis  s/p biopsy  by podiatry at bedside Pathology report  showed acute om  lt heel  tissue cult + pseudomonas and mrsa  -  dc iv abx zosyne today   started  po  cipro  , started iv vanco   daily as per id dr vidal grajeda    vanco trough before  3rd dose with   picc line  dc reynaldo  in am once insurance  approve .    mario  with hydration improved  stable cr.   hypernatremia  sec to dehydration  resolved    .    chr afib on full ac resumed/ rate controlled . dc plan rehab .

## 2018-08-10 NOTE — PROGRESS NOTE ADULT - PROBLEM SELECTOR PLAN 2
-Chronic disease, presently stable. Patient bedbound  -continue home med Carbadopa/Levadopa 25/100 mg PO TID  -Continue home med rasagiline (Azilect) 0.5 mg PO daily -Chronic disease, presently stable. Patient bedbound to chair   -continue home med Carbadopa/Levadopa 25/100 mg PO TID  -Continue home med rasagiline (Azilect) 0.5 mg PO daily

## 2018-08-10 NOTE — PROGRESS NOTE ADULT - ASSESSMENT
Pressure ulcers both heels, stage 3  Osteomyelitis L heel  Path acute osteomyelitis  Cx polymicrobial incl MRSA and P. aeruginosa R meropenem  LORENA, improved

## 2018-08-10 NOTE — PROGRESS NOTE ADULT - SUBJECTIVE AND OBJECTIVE BOX
Patient is a 85y old  Female who presents with a chief complaint of Bilateral heel cellulitis (09 Aug 2018 15:07)      HPI:  85F with PMH HTN, a fib on Xarelto, Parkinson's Dx, Hx of CVA, bedbound presents from [facility] for evaluation of bilateral heel wounds. Patient A&O1-2 and doesn't answer questions appropriately, history obtained from family at bedside. Patient reports left sided ankle pain. Per family, patient had bilateral wounds for 7-9 months, being treated by wound care center in [place]. Visiting nurse assessed wounds today and stated R heel looked worse compared to last week and sent patient to the ED. Visiting nurse changed dressing on bilateral feet wounds. Family also requesting patient to become DNR and discuss advance directives.     In the ED: temp 98.3, HR 68, BP 98/62, RR 18, SpO2 91% RA. ESR 80, BUN/Cr: 20/1.2. Chest xray: no active disease. Xray right foot: No acute fracture, dislocation, or destructive lesions evident. Xray left foot: No acute fracture, dislocation, or destructive bone lesions evident. Soft tissue ulcer may be present inferior to the left calcaneus visible on the oblique view. Received IV Vancomycin. Dr. Álvarez, Wound Care, evaluated patient in the ED. (31 Jul 2018 14:16)      INTERVAL HPI/OVERNIGHT EVENTS:  No acute events overnight. Patient seen sitting in bed eating breakfast with assistance by son (Alexis).   T(C): 37.3 (08-10-18 @ 05:21), Max: 37.3 (08-10-18 @ 05:21)  HR: 69 (08-10-18 @ 10:38) (69 - 101)  BP: 90/67 (08-10-18 @ 10:38) (90/67 - 136/79)  RR: 16 (08-10-18 @ 05:21) (16 - 18)  SpO2: 96% (08-10-18 @ 05:21) (96% - 98%)  Wt(kg): --  I&O's Summary      REVIEW OF SYSTEMS:  Unable to assess due to patient's baseline mental status.     PHYSICAL EXAM:  GENERAL: NAD, well-groomed, well-developed  HEAD:  Atraumatic, Normocephalic  EYES: EOMI, PERRLA, conjunctiva and sclera clear  ENMT: No tonsillar erythema, exudates, or enlargement; Moist mucous membranes, Good dentition, No lesions  NECK: Supple, No JVD, Normal thyroid  NERVOUS SYSTEM:  Alert & Oriented X3, Good concentration; Motor Strength 5/5 B/L upper and lower extremities; DTRs 2+ intact and symmetric  CHEST/LUNG: Clear to percussion bilaterally; No rales, rhonchi, wheezing, or rubs  HEART: Regular rate and rhythm; No murmurs, rubs, or gallops  ABDOMEN: Soft, Nontender, Nondistended; Bowel sounds present  EXTREMITIES:  2+ Peripheral Pulses, No clubbing, cyanosis, or edema  LYMPH: No lymphadenopathy noted  SKIN: No rashes or lesions    MEDICATIONS  (STANDING):  ATENolol  Tablet 25 milliGRAM(s) Oral daily  carbidopa/levodopa  25/100 1 Tablet(s) Oral three times a day  ciprofloxacin     Tablet 500 milliGRAM(s) Oral every 12 hours  collagenase Ointment 1 Application(s) Topical daily  levothyroxine 25 MICROGram(s) Oral daily  mirtazapine 7.5 milliGRAM(s) Oral at bedtime  piperacillin/tazobactam IVPB. 3.375 Gram(s) IV Intermittent every 8 hours  rasagiline Tablet 0.5 milliGRAM(s) Oral daily  simvastatin 10 milliGRAM(s) Oral at bedtime  vancomycin  IVPB 1000 milliGRAM(s) IV Intermittent once    MEDICATIONS  (PRN):  acetaminophen   Tablet 650 milliGRAM(s) Oral every 6 hours PRN For Temp greater than 38 C (100.4 F)  acetaminophen   Tablet. 650 milliGRAM(s) Oral every 6 hours PRN Moderate Pain (4 - 6)      LABS:                        9.8    7.91  )-----------( 369      ( 10 Aug 2018 06:12 )             29.6     08-10    144  |  108  |  14  ----------------------------<  83  3.5   |  27  |  1.20    Ca    8.7      10 Aug 2018 06:12          CAPILLARY BLOOD GLUCOSE      POCT Blood Glucose.: 110 mg/dL (09 Aug 2018 17:34)              RADIOLOGY & ADDITIONAL TESTS:    Imaging Personally Reviewed:       Advance Directives:      Palliative Care: Patient is a 85y old  Female who presents with a chief complaint of Bilateral heel cellulitis (09 Aug 2018 15:07)      HPI:  85F with PMH HTN, a fib on Xarelto, Parkinson's Dx, Hx of CVA, bedbound presents from [facility] for evaluation of bilateral heel wounds. Patient A&O1-2 and doesn't answer questions appropriately, history obtained from family at bedside. Patient reports left sided ankle pain. Per family, patient had bilateral wounds for 7-9 months, being treated by wound care center in [place]. Visiting nurse assessed wounds today and stated R heel looked worse compared to last week and sent patient to the ED. Visiting nurse changed dressing on bilateral feet wounds. Family also requesting patient to become DNR and discuss advance directives.        admitted for bilateral heel cellulitis L heel osteomyelitis with positive mrsa and pseudomonas  with picc line iv abx total 6wk     INTERVAL HPI/OVERNIGHT EVENTS: pt seen and examine today pt alert awake  No events overnight , no fever  , no distress  ,  .Patient seen sitting in bed eating breakfast with assistance by son (Alexis).   T(C): 37.3 (08-10-18 @ 05:21), Max: 37.3 (08-10-18 @ 05:21)  HR: 69 (08-10-18 @ 10:38) (69 - 101)  BP: 90/67 (08-10-18 @ 10:38) (90/67 - 136/79)  RR: 16 (08-10-18 @ 05:21) (16 - 18)  SpO2: 96% (08-10-18 @ 05:21) (96% - 98%)  Wt(kg): --  I&O's Summary      REVIEW OF SYSTEMS:  Unable to assess due to patient's baseline mental status.     PHYSICAL EXAM:  GENERAL: NAD,, well-developed  HEAD:  Atraumatic, Normocephalic  EYES: EOMI, PERRLA, conjunctiva and sclera clear  ENMT:  Moist mucous membranes, No lesions  NECK: Supple, No JVD,   NERVOUS SYSTEM:  Alert & Oriented X1  Motor Strength 5/5 B/L upper and lower extremities;  CHEST/LUNG: Clear to percussion bilaterally; No rales, rhonchi, wheezing,  HEART: Regular rate and rhythm; No murmurs, no tachy   ABDOMEN: Soft, Nontender, Nondistended; Bowel sounds present  EXTREMITIES:  2+ Peripheral Pulses, No clubbing, cyanosis, or edema  SKIN: No rashes or lesions/ lt heel and rt heel off loading boot and wound with bandage     MEDICATIONS  (STANDING):  ATENolol  Tablet 25 milliGRAM(s) Oral daily  carbidopa/levodopa  25/100 1 Tablet(s) Oral three times a day  ciprofloxacin     Tablet 500 milliGRAM(s) Oral every 12 hours  collagenase Ointment 1 Application(s) Topical daily  levothyroxine 25 MICROGram(s) Oral daily  mirtazapine 7.5 milliGRAM(s) Oral at bedtime  piperacillin/tazobactam IVPB. 3.375 Gram(s) IV Intermittent every 8 hours  rasagiline Tablet 0.5 milliGRAM(s) Oral daily  simvastatin 10 milliGRAM(s) Oral at bedtime  vancomycin  IVPB 1000 milliGRAM(s) IV Intermittent once    MEDICATIONS  (PRN):  acetaminophen   Tablet 650 milliGRAM(s) Oral every 6 hours PRN For Temp greater than 38 C (100.4 F)  acetaminophen   Tablet. 650 milliGRAM(s) Oral every 6 hours PRN Moderate Pain (4 - 6)      LABS:                        9.8    7.91  )-----------( 369      ( 10 Aug 2018 06:12 )             29.6     08-10    144  |  108  |  14  ----------------------------<  83  3.5   |  27  |  1.20    Ca    8.7      10 Aug 2018 06:12          CAPILLARY BLOOD GLUCOSE      POCT Blood Glucose.: 110 mg/dL (09 Aug 2018 17:34)              RADIOLOGY & ADDITIONAL TESTS:    Imaging Personally Reviewed:     no new test   Advance Directives:  dnr     Palliative Care:    appropriate

## 2018-08-11 LAB
ANION GAP SERPL CALC-SCNC: 10 MMOL/L — SIGNIFICANT CHANGE UP (ref 5–17)
BUN SERPL-MCNC: 15 MG/DL — SIGNIFICANT CHANGE UP (ref 7–23)
CALCIUM SERPL-MCNC: 8.8 MG/DL — SIGNIFICANT CHANGE UP (ref 8.5–10.1)
CHLORIDE SERPL-SCNC: 108 MMOL/L — SIGNIFICANT CHANGE UP (ref 96–108)
CO2 SERPL-SCNC: 29 MMOL/L — SIGNIFICANT CHANGE UP (ref 22–31)
CREAT SERPL-MCNC: 1.2 MG/DL — SIGNIFICANT CHANGE UP (ref 0.5–1.3)
GLUCOSE SERPL-MCNC: 84 MG/DL — SIGNIFICANT CHANGE UP (ref 70–99)
HCT VFR BLD CALC: 27.7 % — LOW (ref 34.5–45)
HGB BLD-MCNC: 8.9 G/DL — LOW (ref 11.5–15.5)
MCHC RBC-ENTMCNC: 28.9 PG — SIGNIFICANT CHANGE UP (ref 27–34)
MCHC RBC-ENTMCNC: 32.1 GM/DL — SIGNIFICANT CHANGE UP (ref 32–36)
MCV RBC AUTO: 89.9 FL — SIGNIFICANT CHANGE UP (ref 80–100)
NRBC # BLD: 0 /100 WBCS — SIGNIFICANT CHANGE UP (ref 0–0)
PLATELET # BLD AUTO: 339 K/UL — SIGNIFICANT CHANGE UP (ref 150–400)
POTASSIUM SERPL-MCNC: 3.6 MMOL/L — SIGNIFICANT CHANGE UP (ref 3.5–5.3)
POTASSIUM SERPL-SCNC: 3.6 MMOL/L — SIGNIFICANT CHANGE UP (ref 3.5–5.3)
RBC # BLD: 3.08 M/UL — LOW (ref 3.8–5.2)
RBC # FLD: 15.3 % — HIGH (ref 10.3–14.5)
SODIUM SERPL-SCNC: 147 MMOL/L — HIGH (ref 135–145)
WBC # BLD: 7.62 K/UL — SIGNIFICANT CHANGE UP (ref 3.8–10.5)
WBC # FLD AUTO: 7.62 K/UL — SIGNIFICANT CHANGE UP (ref 3.8–10.5)

## 2018-08-11 PROCEDURE — 99233 SBSQ HOSP IP/OBS HIGH 50: CPT

## 2018-08-11 RX ADMIN — RIVAROXABAN 15 MILLIGRAM(S): KIT at 18:35

## 2018-08-11 RX ADMIN — CARBIDOPA AND LEVODOPA 1 TABLET(S): 25; 100 TABLET ORAL at 15:56

## 2018-08-11 RX ADMIN — RASAGILINE 0.5 MILLIGRAM(S): 0.5 TABLET ORAL at 15:44

## 2018-08-11 RX ADMIN — ATENOLOL 25 MILLIGRAM(S): 25 TABLET ORAL at 07:11

## 2018-08-11 RX ADMIN — Medication 25 MICROGRAM(S): at 07:11

## 2018-08-11 RX ADMIN — Medication 250 MILLIGRAM(S): at 09:50

## 2018-08-11 RX ADMIN — Medication 1 APPLICATION(S): at 11:44

## 2018-08-11 RX ADMIN — Medication 650 MILLIGRAM(S): at 15:42

## 2018-08-11 RX ADMIN — CARBIDOPA AND LEVODOPA 1 TABLET(S): 25; 100 TABLET ORAL at 07:10

## 2018-08-11 RX ADMIN — Medication 500 MILLIGRAM(S): at 18:35

## 2018-08-11 RX ADMIN — Medication 500 MILLIGRAM(S): at 07:11

## 2018-08-11 RX ADMIN — MIRTAZAPINE 7.5 MILLIGRAM(S): 45 TABLET, ORALLY DISINTEGRATING ORAL at 21:15

## 2018-08-11 RX ADMIN — SIMVASTATIN 10 MILLIGRAM(S): 20 TABLET, FILM COATED ORAL at 21:15

## 2018-08-11 RX ADMIN — Medication 650 MILLIGRAM(S): at 16:42

## 2018-08-11 NOTE — PROGRESS NOTE ADULT - PROBLEM SELECTOR PLAN 1
-Results of L bone Bx (8/3) showed acute L osteomyelitis. S/P PICC line.   -Resuming Vancomycin QD as per ID rec's. Vanc trough 13.6.   Continue Vanc trough pre-3rd dose,  Monitior Kidney Function   - lt heel  tissue cult + pseudomonas and mrsa. D/C Zosyn  -Start Ciprofloxacin 500 mg Q12H as per ID rec's to cover pseudomonas   -Podiatry Dr. Vargas, performed bone biopsy at bedside   -Continue collagenase ointment in bilateral heels  -Continue off-loading of heels bilaterally  -Dr. Álvarez, wound care recommendations appreciated. Continue Collagenase, protective dressing

## 2018-08-11 NOTE — PROGRESS NOTE ADULT - SUBJECTIVE AND OBJECTIVE BOX
Patient is a 85y old  Female who presents with a chief complaint of Bilateral heel cellulitis (09 Aug 2018 15:07)      HPI:  85F with PMH HTN, a fib on Xarelto, Parkinson's Dx, Hx of CVA, bedbound presents from [facility] for evaluation of bilateral heel wounds. Patient A&O1-2 and doesn't answer questions appropriately, history obtained from family at bedside. Patient reports left sided ankle pain. Per family, patient had bilateral wounds for 7-9 months, being treated by wound care center in [place]. Visiting nurse assessed wounds today and stated R heel looked worse compared to last week and sent patient to the ED. Visiting nurse changed dressing on bilateral feet wounds. Family also requesting patient to become DNR and discuss advance directives.        admitted for bilateral heel cellulitis L heel osteomyelitis with positive mrsa and pseudomonas  with picc line iv abx total 6wk     INTERVAL HPI/OVERNIGHT EVENTS: pt seen and examine today pt alert awake, resting in bed, no fever, no distress.,    Vital Signs Last 24 Hrs  T(C): 36.4 (11 Aug 2018 14:00), Max: 36.8 (11 Aug 2018 06:21)  T(F): 97.6 (11 Aug 2018 14:00), Max: 98.3 (11 Aug 2018 06:21)  HR: 113 (11 Aug 2018 14:00) (66 - 113)  BP: 158/77 (11 Aug 2018 14:00) (132/83 - 158/77)  BP(mean): --  RR: 18 (11 Aug 2018 14:00) (17 - 18)  SpO2: 99% (11 Aug 2018 14:00) (95% - 99%)    REVIEW OF SYSTEMS:  Unable to assess due to patient's baseline mental status.     PHYSICAL EXAM:  GENERAL: NAD,, well-developed  HEAD:  Atraumatic, Normocephalic  EYES: EOMI, PERRLA, conjunctiva and sclera clear  ENMT:  Moist mucous membranes, No lesions  NECK: Supple, No JVD,   NERVOUS SYSTEM:  Alert & Oriented X1  Motor Strength 5/5 B/L upper and lower extremities;  CHEST/LUNG: Clear to percussion bilaterally; No rales, rhonchi, wheezing,  HEART: Regular rate and rhythm; No murmurs, no tachy   ABDOMEN: Soft, Nontender, Nondistended; Bowel sounds present  EXTREMITIES:  2+ Peripheral Pulses, No clubbing, cyanosis, or edema  SKIN: No rashes or lesions/ lt heel and rt heel off loading boot and wound with bandage     MEDICATIONS  (STANDING):  ATENolol  Tablet 25 milliGRAM(s) Oral daily  carbidopa/levodopa  25/100 1 Tablet(s) Oral three times a day  ciprofloxacin     Tablet 500 milliGRAM(s) Oral every 12 hours  collagenase Ointment 1 Application(s) Topical daily  levothyroxine 25 MICROGram(s) Oral daily  mirtazapine 7.5 milliGRAM(s) Oral at bedtime  piperacillin/tazobactam IVPB. 3.375 Gram(s) IV Intermittent every 8 hours  rasagiline Tablet 0.5 milliGRAM(s) Oral daily  simvastatin 10 milliGRAM(s) Oral at bedtime  vancomycin  IVPB 1000 milliGRAM(s) IV Intermittent once    MEDICATIONS  (PRN):  acetaminophen   Tablet 650 milliGRAM(s) Oral every 6 hours PRN For Temp greater than 38 C (100.4 F)  acetaminophen   Tablet. 650 milliGRAM(s) Oral every 6 hours PRN Moderate Pain (4 - 6)      LABS:                        8.9    7.62  )-----------( 339      ( 11 Aug 2018 07:06 )             27.7     11 Aug 2018 07:06    147    |  108    |  15     ----------------------------<  84     3.6     |  29     |  1.20     Ca    8.8        11 Aug 2018 07:06          CAPILLARY BLOOD GLUCOSE        UCx       RADIOLOGY & ADDITIONAL TESTS:          Imaging Personally Reviewed:     no new test   Advance Directives:  dnr     Palliative Care:    appropriate

## 2018-08-11 NOTE — PROGRESS NOTE ADULT - PROBLEM SELECTOR PLAN 2
-Chronic disease, presently stable. Patient bedbound to chair   -continue home med Carbadopa/Levadopa 25/100 mg PO TID  -Continue home med rasagiline (Azilect) 0.5 mg PO daily

## 2018-08-12 LAB
ANION GAP SERPL CALC-SCNC: 9 MMOL/L — SIGNIFICANT CHANGE UP (ref 5–17)
BUN SERPL-MCNC: 15 MG/DL — SIGNIFICANT CHANGE UP (ref 7–23)
CALCIUM SERPL-MCNC: 8.7 MG/DL — SIGNIFICANT CHANGE UP (ref 8.5–10.1)
CHLORIDE SERPL-SCNC: 108 MMOL/L — SIGNIFICANT CHANGE UP (ref 96–108)
CO2 SERPL-SCNC: 27 MMOL/L — SIGNIFICANT CHANGE UP (ref 22–31)
CREAT SERPL-MCNC: 1.3 MG/DL — SIGNIFICANT CHANGE UP (ref 0.5–1.3)
GLUCOSE SERPL-MCNC: 97 MG/DL — SIGNIFICANT CHANGE UP (ref 70–99)
HCT VFR BLD CALC: 30.6 % — LOW (ref 34.5–45)
HGB BLD-MCNC: 10.1 G/DL — LOW (ref 11.5–15.5)
MCHC RBC-ENTMCNC: 29.1 PG — SIGNIFICANT CHANGE UP (ref 27–34)
MCHC RBC-ENTMCNC: 33 GM/DL — SIGNIFICANT CHANGE UP (ref 32–36)
MCV RBC AUTO: 88.2 FL — SIGNIFICANT CHANGE UP (ref 80–100)
NRBC # BLD: 0 /100 WBCS — SIGNIFICANT CHANGE UP (ref 0–0)
PLATELET # BLD AUTO: 392 K/UL — SIGNIFICANT CHANGE UP (ref 150–400)
POTASSIUM SERPL-MCNC: 3.6 MMOL/L — SIGNIFICANT CHANGE UP (ref 3.5–5.3)
POTASSIUM SERPL-SCNC: 3.6 MMOL/L — SIGNIFICANT CHANGE UP (ref 3.5–5.3)
RBC # BLD: 3.47 M/UL — LOW (ref 3.8–5.2)
RBC # FLD: 15.4 % — HIGH (ref 10.3–14.5)
SODIUM SERPL-SCNC: 144 MMOL/L — SIGNIFICANT CHANGE UP (ref 135–145)
VANCOMYCIN TROUGH SERPL-MCNC: 18.2 UG/ML — SIGNIFICANT CHANGE UP (ref 10–20)
WBC # BLD: 9.32 K/UL — SIGNIFICANT CHANGE UP (ref 3.8–10.5)
WBC # FLD AUTO: 9.32 K/UL — SIGNIFICANT CHANGE UP (ref 3.8–10.5)

## 2018-08-12 PROCEDURE — 99233 SBSQ HOSP IP/OBS HIGH 50: CPT

## 2018-08-12 RX ADMIN — Medication 650 MILLIGRAM(S): at 13:50

## 2018-08-12 RX ADMIN — RIVAROXABAN 15 MILLIGRAM(S): KIT at 17:49

## 2018-08-12 RX ADMIN — ATENOLOL 25 MILLIGRAM(S): 25 TABLET ORAL at 05:47

## 2018-08-12 RX ADMIN — Medication 500 MILLIGRAM(S): at 05:47

## 2018-08-12 RX ADMIN — Medication 25 MICROGRAM(S): at 05:47

## 2018-08-12 RX ADMIN — RASAGILINE 0.5 MILLIGRAM(S): 0.5 TABLET ORAL at 11:14

## 2018-08-12 RX ADMIN — Medication 250 MILLIGRAM(S): at 10:00

## 2018-08-12 RX ADMIN — Medication 1 APPLICATION(S): at 11:13

## 2018-08-12 RX ADMIN — SIMVASTATIN 10 MILLIGRAM(S): 20 TABLET, FILM COATED ORAL at 23:51

## 2018-08-12 RX ADMIN — CARBIDOPA AND LEVODOPA 1 TABLET(S): 25; 100 TABLET ORAL at 05:46

## 2018-08-12 RX ADMIN — CARBIDOPA AND LEVODOPA 1 TABLET(S): 25; 100 TABLET ORAL at 22:34

## 2018-08-12 RX ADMIN — CARBIDOPA AND LEVODOPA 1 TABLET(S): 25; 100 TABLET ORAL at 14:14

## 2018-08-12 RX ADMIN — MIRTAZAPINE 7.5 MILLIGRAM(S): 45 TABLET, ORALLY DISINTEGRATING ORAL at 22:33

## 2018-08-12 RX ADMIN — Medication 500 MILLIGRAM(S): at 17:49

## 2018-08-13 VITALS
RESPIRATION RATE: 18 BRPM | SYSTOLIC BLOOD PRESSURE: 110 MMHG | DIASTOLIC BLOOD PRESSURE: 71 MMHG | HEART RATE: 69 BPM | OXYGEN SATURATION: 99 % | TEMPERATURE: 98 F

## 2018-08-13 LAB
ANION GAP SERPL CALC-SCNC: 8 MMOL/L — SIGNIFICANT CHANGE UP (ref 5–17)
BUN SERPL-MCNC: 17 MG/DL — SIGNIFICANT CHANGE UP (ref 7–23)
CALCIUM SERPL-MCNC: 9.1 MG/DL — SIGNIFICANT CHANGE UP (ref 8.5–10.1)
CHLORIDE SERPL-SCNC: 108 MMOL/L — SIGNIFICANT CHANGE UP (ref 96–108)
CO2 SERPL-SCNC: 31 MMOL/L — SIGNIFICANT CHANGE UP (ref 22–31)
CREAT SERPL-MCNC: 1.2 MG/DL — SIGNIFICANT CHANGE UP (ref 0.5–1.3)
GLUCOSE SERPL-MCNC: 100 MG/DL — HIGH (ref 70–99)
HCT VFR BLD CALC: 31 % — LOW (ref 34.5–45)
HGB BLD-MCNC: 10 G/DL — LOW (ref 11.5–15.5)
MCHC RBC-ENTMCNC: 28.9 PG — SIGNIFICANT CHANGE UP (ref 27–34)
MCHC RBC-ENTMCNC: 32.3 GM/DL — SIGNIFICANT CHANGE UP (ref 32–36)
MCV RBC AUTO: 89.6 FL — SIGNIFICANT CHANGE UP (ref 80–100)
NRBC # BLD: 0 /100 WBCS — SIGNIFICANT CHANGE UP (ref 0–0)
PLATELET # BLD AUTO: 381 K/UL — SIGNIFICANT CHANGE UP (ref 150–400)
POTASSIUM SERPL-MCNC: 3.6 MMOL/L — SIGNIFICANT CHANGE UP (ref 3.5–5.3)
POTASSIUM SERPL-SCNC: 3.6 MMOL/L — SIGNIFICANT CHANGE UP (ref 3.5–5.3)
RBC # BLD: 3.46 M/UL — LOW (ref 3.8–5.2)
RBC # FLD: 15.7 % — HIGH (ref 10.3–14.5)
SODIUM SERPL-SCNC: 147 MMOL/L — HIGH (ref 135–145)
VANCOMYCIN TROUGH SERPL-MCNC: 19 UG/ML — SIGNIFICANT CHANGE UP (ref 10–20)
WBC # BLD: 9.44 K/UL — SIGNIFICANT CHANGE UP (ref 3.8–10.5)
WBC # FLD AUTO: 9.44 K/UL — SIGNIFICANT CHANGE UP (ref 3.8–10.5)

## 2018-08-13 PROCEDURE — 99239 HOSP IP/OBS DSCHRG MGMT >30: CPT

## 2018-08-13 RX ORDER — MIRTAZAPINE 45 MG/1
1 TABLET, ORALLY DISINTEGRATING ORAL
Qty: 0 | Refills: 0 | DISCHARGE
Start: 2018-08-13

## 2018-08-13 RX ORDER — ASCORBIC ACID 60 MG
500 TABLET,CHEWABLE ORAL DAILY
Refills: 0 | Status: CANCELLED | OUTPATIENT
Start: 2018-08-13 | End: 2018-08-13

## 2018-08-13 RX ORDER — ATENOLOL 25 MG/1
1 TABLET ORAL
Qty: 0 | Refills: 0 | DISCHARGE
Start: 2018-08-13

## 2018-08-13 RX ORDER — ASCORBIC ACID 60 MG
1 TABLET,CHEWABLE ORAL
Qty: 0 | Refills: 0 | DISCHARGE
Start: 2018-08-13

## 2018-08-13 RX ORDER — MULTIVIT-MIN/FERROUS GLUCONATE 9 MG/15 ML
1 LIQUID (ML) ORAL DAILY
Refills: 0 | Status: CANCELLED | OUTPATIENT
Start: 2018-08-13 | End: 2018-08-13

## 2018-08-13 RX ORDER — RIVAROXABAN 15 MG-20MG
1 KIT ORAL
Qty: 0 | Refills: 0 | DISCHARGE
Start: 2018-08-13

## 2018-08-13 RX ORDER — VANCOMYCIN HCL 1 G
1 VIAL (EA) INTRAVENOUS
Qty: 40 | Refills: 0
Start: 2018-08-13 | End: 2018-09-21

## 2018-08-13 RX ORDER — SIMVASTATIN 20 MG/1
1 TABLET, FILM COATED ORAL
Qty: 0 | Refills: 0 | DISCHARGE
Start: 2018-08-13

## 2018-08-13 RX ORDER — RASAGILINE 0.5 MG/1
1 TABLET ORAL
Qty: 0 | Refills: 0 | DISCHARGE
Start: 2018-08-13

## 2018-08-13 RX ADMIN — Medication 25 MICROGRAM(S): at 05:30

## 2018-08-13 RX ADMIN — ATENOLOL 25 MILLIGRAM(S): 25 TABLET ORAL at 05:31

## 2018-08-13 RX ADMIN — CARBIDOPA AND LEVODOPA 1 TABLET(S): 25; 100 TABLET ORAL at 05:31

## 2018-08-13 RX ADMIN — Medication 500 MILLIGRAM(S): at 05:31

## 2018-08-13 RX ADMIN — RIVAROXABAN 15 MILLIGRAM(S): KIT at 16:52

## 2018-08-13 RX ADMIN — Medication 500 MILLIGRAM(S): at 17:00

## 2018-08-13 RX ADMIN — Medication 250 MILLIGRAM(S): at 09:56

## 2018-08-13 NOTE — PROGRESS NOTE ADULT - SUBJECTIVE AND OBJECTIVE BOX
Patient is a 85y old  Female who presents with a chief complaint of Bilateral heel cellulitis (09 Aug 2018 15:07)      HPI:  85F with PMH HTN, a fib on Xarelto, Parkinson's Dx, Hx of CVA, bedbound presents from [facility] for evaluation of bilateral heel wounds. Patient A&O1-2 and doesn't answer questions appropriately, history obtained from family at bedside. Patient reports left sided ankle pain. Per family, patient had bilateral wounds for 7-9 months, being treated by wound care center in [place]. Visiting nurse assessed wounds today and stated R heel looked worse compared to last week and sent patient to the ED. Visiting nurse changed dressing on bilateral feet wounds. Family also requesting patient to become DNR and discuss advance directives.        admitted for bilateral heel cellulitis L heel osteomyelitis with positive mrsa and pseudomonas  with picc line iv abx total 6wk     INTERVAL HPI/OVERNIGHT EVENTS: pt seen and examined resting in bed, no fever, no distress.,    ICU Vital Signs Last 24 Hrs  T(C): 36.4 (13 Aug 2018 13:37), Max: 36.8 (13 Aug 2018 05:14)  T(F): 97.6 (13 Aug 2018 13:37), Max: 98.3 (13 Aug 2018 05:14)  HR: 69 (13 Aug 2018 13:37) (69 - 102)  BP: 110/71 (13 Aug 2018 13:37) (110/71 - 128/79)  RR: 18 (13 Aug 2018 13:37) (17 - 18)  SpO2: 99% (13 Aug 2018 13:37) (98% - 99%)      REVIEW OF SYSTEMS:  Unable to assess due to patient's baseline mental status.     PHYSICAL EXAM:  GENERAL: NAD,, well-developed  HEAD:  Atraumatic, Normocephalic  ENMT:  Moist mucous membranes, No lesions  NECK: Supple, No JVD,  CHEST/LUNG: Clear to percussion bilaterally; No rales, rhonchi, wheezing,  HEART: Regular rate and rhythm; No murmurs, no tachy   ABDOMEN: Soft, Nontender, Nondistended; Bowel sounds present  EXTREMITIES:  2+ Peripheral Pulses, No clubbing, cyanosis, or edema. R arm PICC line, clean and nonerythematous. L Leg wrapped in bandage.   SKIN: No rashes or lesions/ lt heel and rt heel off loading boot and wound with bandage     MEDICATIONS  (STANDING):  ATENolol  Tablet 25 milliGRAM(s) Oral daily  carbidopa/levodopa  25/100 1 Tablet(s) Oral three times a day  ciprofloxacin     Tablet 500 milliGRAM(s) Oral every 12 hours  collagenase Ointment 1 Application(s) Topical daily  levothyroxine 25 MICROGram(s) Oral daily  mirtazapine 7.5 milliGRAM(s) Oral at bedtime  piperacillin/tazobactam IVPB. 3.375 Gram(s) IV Intermittent every 8 hours  rasagiline Tablet 0.5 milliGRAM(s) Oral daily  simvastatin 10 milliGRAM(s) Oral at bedtime  vancomycin  IVPB 1000 milliGRAM(s) IV Intermittent once    MEDICATIONS  (PRN):  acetaminophen   Tablet 650 milliGRAM(s) Oral every 6 hours PRN For Temp greater than 38 C (100.4 F)  acetaminophen   Tablet. 650 milliGRAM(s) Oral every 6 hours PRN Moderate Pain (4 - 6)      LABS:                        8.9    7.62  )-----------( 339      ( 11 Aug 2018 07:06 )             27.7     11 Aug 2018 07:06    147    |  108    |  15     ----------------------------<  84     3.6     |  29     |  1.20     Ca    8.8        11 Aug 2018 07:06          CAPILLARY BLOOD GLUCOSE        UCx       RADIOLOGY & ADDITIONAL TESTS:          Imaging Personally Reviewed:     no new test   Advance Directives:  dnr     Palliative Care:    appropriate

## 2018-08-13 NOTE — PROGRESS NOTE ADULT - SUBJECTIVE AND OBJECTIVE BOX
infectious diseases progress note:    MIGUEL LORD is a 85y y. o. Female patient    Patient with no acute overnight issues  Allergies    No Known Allergies    Intolerances        ANTIBIOTICS/RELEVANT:  antimicrobials  ciprofloxacin     Tablet 500 milliGRAM(s) Oral every 12 hours  vancomycin  IVPB 1000 milliGRAM(s) IV Intermittent <User Schedule>    immunologic:    OTHER:  acetaminophen   Tablet 650 milliGRAM(s) Oral every 6 hours PRN  acetaminophen   Tablet. 650 milliGRAM(s) Oral every 6 hours PRN  ATENolol  Tablet 25 milliGRAM(s) Oral daily  carbidopa/levodopa  25/100 1 Tablet(s) Oral three times a day  collagenase Ointment 1 Application(s) Topical daily  levothyroxine 25 MICROGram(s) Oral daily  mirtazapine 7.5 milliGRAM(s) Oral at bedtime  rasagiline Tablet 0.5 milliGRAM(s) Oral daily  rivaroxaban 15 milliGRAM(s) Oral every 24 hours  simvastatin 10 milliGRAM(s) Oral at bedtime      Objective:  Vital Signs Last 24 Hrs  T(C): 36.8 (13 Aug 2018 05:14), Max: 36.9 (12 Aug 2018 13:40)  T(F): 98.3 (13 Aug 2018 05:14), Max: 98.4 (12 Aug 2018 13:40)  HR: 99 (13 Aug 2018 05:14) (97 - 102)  BP: 120/73 (13 Aug 2018 05:14) (120/73 - 128/79)  BP(mean): --  RR: 17 (13 Aug 2018 05:14) (17 - 18)  SpO2: 98% (13 Aug 2018 05:14) (98% - 99%)    T(C): 36.8 (08-13-18 @ 05:14), Max: 37.4 (08-11-18 @ 21:19)  T(C): 36.8 (08-13-18 @ 05:14), Max: 37.4 (08-11-18 @ 21:19)  T(C): 36.8 (08-13-18 @ 05:14), Max: 37.4 (08-11-18 @ 21:19)    PHYSICAL EXAM:  Constitutional: Well-developed, well nourished  Eyes: PERRLA, EOMI  Ear/Nose/Throat: oropharynx normal	  Neck: no JVD, no lymphadenopathy, supple  Respiratory: no accessory muscle use  Cardiovascular: RRR,   Gastrointestinal: soft, NT  Extremities: no clubbing, no cyanosis, feet wrapped      LABS:                        10.0   9.44  )-----------( 381      ( 13 Aug 2018 07:21 )             31.0       9.44 08-13 @ 07:21  9.32 08-12 @ 08:24  7.62 08-11 @ 07:06  7.91 08-10 @ 06:12  9.22 08-09 @ 06:10  8.26 08-08 @ 06:27  8.47 08-07 @ 06:40      08-13    147<H>  |  108  |  17  ----------------------------<  100<H>  3.6   |  31  |  1.20    Ca    9.1      13 Aug 2018 07:21        Creatinine, Serum: 1.20 mg/dL (08-13-18 @ 07:21)  Creatinine, Serum: 1.30 mg/dL (08-12-18 @ 08:24)  Creatinine, Serum: 1.20 mg/dL (08-11-18 @ 07:06)  Creatinine, Serum: 1.20 mg/dL (08-10-18 @ 06:12)  Creatinine, Serum: 1.30 mg/dL (08-09-18 @ 06:10)  Creatinine, Serum: 1.40 mg/dL (08-08-18 @ 06:27)  Creatinine, Serum: 1.70 mg/dL (08-07-18 @ 06:40)      MICROBIOLOGY:    Culture - Tissue with Gram Stain (08.03.18 @ 16:46)    -  Imipenem: S <=1    -  Gentamicin: S <=1    -  Gentamicin: S 2 Should not be used as monotherapy    -  Levofloxacin: S <=1    -  Linezolid: S 4    -  Meropenem: S <=1    -  Oxacillin: R >2    -  Penicillin: R >8    -  Piperacillin/Tazobactam: S <=8    -  RIF- Rifampin: S <=1 Should not be used as monotherapy    -  Tetra/Doxy: S <=1    -  Trimethoprim/Sulfamethoxazole: S <=0.5/9.5    -  Trimethoprim/Sulfamethoxazole: S <=0.5/9.5    -  Tobramycin: S <=2    -  Vancomycin: S 2    Gram Stain:   No polymorphonuclear cells seen  No organisms seen    -  Amikacin: S <=8    -  Amoxicillin/Clavulanic Acid: R >16/8    -  Ampicillin/Sulbactam: R >16/8    -  Ampicillin/Sulbactam: R <=8/4    -  Ampicillin: R >16 These ampicillin results predict results for amoxicillin    -  Aztreonam: S <=4    -  Cefazolin: R >16    -  Cefazolin: R <=4    -  Cefepime: S <=2    -  Cefoxitin: R >16    -  Ceftriaxone: S <=1 Enterobacter, Citrobacter, and Serratia may develop resistance during prolonged therapy    -  Ciprofloxacin: S <=0.5    -  Clindamycin: R >4    -  Daptomycin: S 1    -  Ertapenem: S <=0.5    -  Erythromycin: R >4    Specimen Source: .Tissue Other, left heel    Culture Results:   Rare Enterobacter cloacae/asburiae  Growth in fluid media only Methicillin resistant Staphylococcus aureus  Rare Corynebacterium species    Organism Identification: Enterobacter cloacae/absuria  Methicillin resistant Staphylococcus aureus    Organism: Enterobacter cloacae/absuria    Organism: Methicillin resistant Staphylococcus aureus    Method Type: JARRET    Method Type: JARRET        RADIOLOGY & ADDITIONAL STUDIES:

## 2018-08-13 NOTE — PROGRESS NOTE ADULT - NSHPATTENDINGPLANDISCUSS_GEN_ALL_CORE
nurse
attending
d/w Dr Clements
RN
RN
resident
resident and RN
resident and RN
nurse
nurse
Consultants, Residents, /CM
MAYKEL/CM, Residents, RN
nurse
nurse/ son
nurse
Daughter, RN, residents

## 2018-08-13 NOTE — PROGRESS NOTE ADULT - SUBJECTIVE AND OBJECTIVE BOX
Patient is a 85y old  Female who presents with a chief complaint of heel wounds (31 Jul 2018 14:16)      Patient seen in follow up for LORENA.     PAST MEDICAL HISTORY:  Stroke  Hypothyroidism  Hyperlipidemia  Parkinson disease  Afib  Hypertension  Dementia      MEDICATIONS  (STANDING):  ATENolol  Tablet 25 milliGRAM(s) Oral daily  carbidopa/levodopa  25/100 1 Tablet(s) Oral three times a day  ciprofloxacin     Tablet 500 milliGRAM(s) Oral every 12 hours  collagenase Ointment 1 Application(s) Topical daily  levothyroxine 25 MICROGram(s) Oral daily  mirtazapine 7.5 milliGRAM(s) Oral at bedtime  rasagiline Tablet 0.5 milliGRAM(s) Oral daily  rivaroxaban 15 milliGRAM(s) Oral every 24 hours  simvastatin 10 milliGRAM(s) Oral at bedtime  vancomycin  IVPB 1000 milliGRAM(s) IV Intermittent <User Schedule>    MEDICATIONS  (PRN):  acetaminophen   Tablet 650 milliGRAM(s) Oral every 6 hours PRN For Temp greater than 38 C (100.4 F)  acetaminophen   Tablet. 650 milliGRAM(s) Oral every 6 hours PRN Moderate Pain (4 - 6)    T(C): 36.8 (08-13-18 @ 05:14), Max: 37.4 (08-11-18 @ 21:19)  HR: 99 (08-13-18 @ 05:14) (97 - 113)  BP: 120/73 (08-13-18 @ 05:14) (107/71 - 158/77)  RR: 17 (08-13-18 @ 05:14)  SpO2: 98% (08-13-18 @ 05:14)  Wt(kg): --  I&O's Detail    13 Aug 2018 07:01  -  13 Aug 2018 12:03  --------------------------------------------------------  IN:    Solution: 250 mL  Total IN: 250 mL    OUT:  Total OUT: 0 mL    Total NET: 250 mL          PHYSICAL EXAM:  General: NAD  Respiratory: b/l air entry  Cardiovascular: S1 S2  Gastrointestinal: soft  Extremities:  edema     LABORATORY:                        10.0   9.44  )-----------( 381      ( 13 Aug 2018 07:21 )             31.0     08-13    147<H>  |  108  |  17  ----------------------------<  100<H>  3.6   |  31  |  1.20    Ca    9.1      13 Aug 2018 07:21      Sodium, Serum: 147 mmol/L (08-13 @ 07:21)  Sodium, Serum: 144 mmol/L (08-12 @ 08:24)    Potassium, Serum: 3.6 mmol/L (08-13 @ 07:21)  Potassium, Serum: 3.6 mmol/L (08-12 @ 08:24)    Hemoglobin: 10.0 g/dL (08-13 @ 07:21)  Hemoglobin: 10.1 g/dL (08-12 @ 08:24)  Hemoglobin: 8.9 g/dL (08-11 @ 07:06)    Creatinine, Serum 1.20 (08-13 @ 07:21)  Creatinine, Serum 1.30 (08-12 @ 08:24)  Creatinine, Serum 1.20 (08-11 @ 07:06)

## 2018-08-13 NOTE — PROGRESS NOTE ADULT - PROBLEM SELECTOR PLAN 1
-Awaiting MEHUL. Family deciding where pt. is going with Social work  -Results of L bone Bx (8/3) showed acute L osteomyelitis. S/P PICC line.   -Resuming Vancomycin QD as per ID rec's  -Continue Cipro PO  Continue Vanc trough pre-3rd dose,  Monitior Kidney Function   - lt heel  tissue cult + pseudomonas and mrsa. D/C Zosyn  -Start Ciprofloxacin 500 mg Q12H as per ID rec's to cover pseudomonas   -Podiatry Dr. Vargas, performed bone biopsy at bedside   -Continue collagenase ointment in bilateral heels  -Continue off-loading of heels bilaterally  -Dr. Álvarez, wound care recommendations appreciated. Continue Collagenase, protective dressing

## 2018-08-13 NOTE — PROGRESS NOTE ADULT - ASSESSMENT
86 yo female with Pressure ulcers both heels, stage 3  Osteomyelitis L heel  Path acute osteomyelitis  Cx polymicrobial incl MRSA and P. aeruginosa R meropenem  LORENA, improved

## 2018-08-13 NOTE — PROGRESS NOTE ADULT - ASSESSMENT
·	LORENA, CKD 3  ·	Hypertension  ·	Anemia    Stable renal function. To continue current meds. monitor vanco levels. H/H stable.  Monitor BP trend. Titrate BP meds as needed. Salt restriction. Will follow electrolytes and renal function trend.

## 2018-08-13 NOTE — PROGRESS NOTE ADULT - PROVIDER SPECIALTY LIST ADULT
Infectious Disease
Podiatry
Infectious Disease
Nephrology
Infectious Disease
Infectious Disease
Nephrology
Podiatry
Hospitalist
Podiatry
Podiatry
Infectious Disease
Hospitalist
Hospitalist
Infectious Disease
Podiatry
Podiatry
Hospitalist

## 2018-08-13 NOTE — CHART NOTE - NSCHARTNOTESELECT_GEN_ALL_CORE
Nutrition Services
Off Service Note
PGY-1/Event Note
PGY-1/Rapid Response
Nutrition Services

## 2018-08-13 NOTE — PROGRESS NOTE ADULT - PROBLEM SELECTOR PROBLEM 1
Pressure ulcer of both heels, stage 3
Pressure ulcer of both heels, stage 3
Osteomyelitis, unspecified site, unspecified type
R/O Osteomyelitis, unspecified site, unspecified type
Pressure ulcer of both heels, stage 3
R/O Osteomyelitis, unspecified site, unspecified type
R/O Osteomyelitis, unspecified site, unspecified type
Pressure ulcer of both heels, stage 3

## 2018-08-13 NOTE — PROGRESS NOTE ADULT - PROBLEM SELECTOR PROBLEM 3
Hypothyroidism

## 2018-08-13 NOTE — PROGRESS NOTE ADULT - PROBLEM SELECTOR PROBLEM 2
Parkinson disease

## 2018-08-13 NOTE — PROGRESS NOTE ADULT - SUBJECTIVE AND OBJECTIVE BOX
86yo female seen bedside for her bilateral heel decubitus wounds, who is status post left heel wound debridement and bone biopsy on 8/3/18.     Vital Signs Last 24 Hrs  T(C): 36.4 (13 Aug 2018 13:37), Max: 36.8 (13 Aug 2018 05:14)  T(F): 97.6 (13 Aug 2018 13:37), Max: 98.3 (13 Aug 2018 05:14)  HR: 69 (13 Aug 2018 13:37) (69 - 102)  BP: 110/71 (13 Aug 2018 13:37) (110/71 - 128/79)  BP(mean): --  RR: 18 (13 Aug 2018 13:37) (17 - 18)  SpO2: 99% (13 Aug 2018 13:37) (98% - 99%)                          10.0   9.44  )-----------( 381      ( 13 Aug 2018 07:21 )             31.0     08-13    147<H>  |  108  |  17  ----------------------------<  100<H>  3.6   |  31  |  1.20    Ca    9.1      13 Aug 2018 07:21        Objective exam:   vasc: pedal pulses mildly palpable; CFT < 3 seconds x10; TG WNL; no edema noted  Derm:  -wound to lateral plantar left heel noted to measure approximately 6cm x 6cm x 0.2 without probe to bone with fibro-granular base (80:20) and mild serous drainage noted to the dressing; mild malodor present, no periwound erythema or hyperkeratosis noted  -wound to plantar medial right heel noted to measure approximately 3cm x 4cm x 0.1cm without probe to bone with necrotic base (100) without drainage noted to the dressing, no malodor noted, no periwound erythema or hyperkeratosis noted

## 2018-08-13 NOTE — CHART NOTE - NSCHARTNOTEFT_GEN_A_CORE
Assessment: As per chart 85 year old Female with a PMH HTN, a fib on Xarelto, Parkinson's Dx, Hx of CVA, bedbound being treated for bilat helle cellulitis/osteomyelitis. Pt sleeping at time of visit. Per chart pt's appetite has been improving, consuming about 40-75% of meals in house. Last BM 8/10.     Factors impacting intake:  [x ] other: difficulty feeding self     Diet Presciption: Diet, DASH/TLC:   Sodium & Cholesterol Restricted (07-31-18 @ 13:23)    Intake: fair     Current Weight: no updated weight  Previous Weight: (8/8) 169.3lbs  % Weight Change- recommend to obtain pt's updated body weight    Pertinent Medications: MEDICATIONS  (STANDING):  ATENolol  Tablet 25 milliGRAM(s) Oral daily  carbidopa/levodopa  25/100 1 Tablet(s) Oral three times a day  ciprofloxacin     Tablet 500 milliGRAM(s) Oral every 12 hours  collagenase Ointment 1 Application(s) Topical daily  levothyroxine 25 MICROGram(s) Oral daily  mirtazapine 7.5 milliGRAM(s) Oral at bedtime  rasagiline Tablet 0.5 milliGRAM(s) Oral daily  rivaroxaban 15 milliGRAM(s) Oral every 24 hours  simvastatin 10 milliGRAM(s) Oral at bedtime  vancomycin  IVPB 1000 milliGRAM(s) IV Intermittent <User Schedule>    MEDICATIONS  (PRN):  acetaminophen   Tablet 650 milliGRAM(s) Oral every 6 hours PRN For Temp greater than 38 C (100.4 F)  acetaminophen   Tablet. 650 milliGRAM(s) Oral every 6 hours PRN Moderate Pain (4 - 6)    Pertinent Labs: 08-13 Na147 mmol/L<H> Glu 100 mg/dL<H> K+ 3.6 mmol/L Cr  1.20 mg/dL BUN 17 mg/dL     CAPILLARY BLOOD GLUCOSE    Skin: Sacrum II, right heel unstageable , left heel unstageable , right ankle unstageable , left buttocks unstageable, right lateral foot DTI, right dorsal heel DTI.   No edema noted at this time.    Estimated Needs:   [x ] no change since previous assessment  [ ] recalculated:     Previous Nutrition Diagnosis:    [x ] Malnutrition     Nutrition Diagnosis is [ x] ongoing     New Nutrition Diagnosis: [ x] not applicable       Interventions:   Recommend  [ ] Change Diet To:  [ x] Nutrition Supplement: Recommend Ensure Enlive BID, Juevn BID, MVI/daily, Vitamin C/daily  [ ] Nutrition Support  [x ] Other:   1) Continue with current DASH/TLC diet   2) RD to remain available     Monitoring and Evaluation:   [x ] PO intake [ x ] Tolerance to diet prescription [ x ] weights [ x ] labs[ x ] follow up per protocol  [ ] other: Assessment: As per chart 85 year old Female with a PMH HTN, a fib on Xarelto, Parkinson's Dx, Hx of CVA, bedbound being treated for bilat helle cellulitis/osteomyelitis. Pt sleeping at time of visit. Per chart pt's appetite has been improving, consuming about 40-75% of meals in house. Last BM 8/10.     Factors impacting intake:  [x ] other: difficulty feeding self     Diet Presciption: Diet, DASH/TLC:   Sodium & Cholesterol Restricted (07-31-18 @ 13:23)    Intake: fair     Current Weight: no updated weight  Previous Weight: (8/8) 169.3lbs  % Weight Change- recommend to obtain pt's updated body weight    Pertinent Medications: MEDICATIONS  (STANDING):  ATENolol  Tablet 25 milliGRAM(s) Oral daily  carbidopa/levodopa  25/100 1 Tablet(s) Oral three times a day  ciprofloxacin     Tablet 500 milliGRAM(s) Oral every 12 hours  collagenase Ointment 1 Application(s) Topical daily  levothyroxine 25 MICROGram(s) Oral daily  mirtazapine 7.5 milliGRAM(s) Oral at bedtime  rasagiline Tablet 0.5 milliGRAM(s) Oral daily  rivaroxaban 15 milliGRAM(s) Oral every 24 hours  simvastatin 10 milliGRAM(s) Oral at bedtime  vancomycin  IVPB 1000 milliGRAM(s) IV Intermittent <User Schedule>    MEDICATIONS  (PRN):  acetaminophen   Tablet 650 milliGRAM(s) Oral every 6 hours PRN For Temp greater than 38 C (100.4 F)  acetaminophen   Tablet. 650 milliGRAM(s) Oral every 6 hours PRN Moderate Pain (4 - 6)    Pertinent Labs: 08-13 Na147 mmol/L<H> Glu 100 mg/dL<H> K+ 3.6 mmol/L Cr  1.20 mg/dL BUN 17 mg/dL     CAPILLARY BLOOD GLUCOSE    Skin: Sacrum II, right heel unstageable , left heel unstageable , right ankle unstageable , left buttocks unstageable, right lateral foot DTI, right dorsal heel DTI.   No edema noted at this time.    Estimated Needs:   [x ] no change since previous assessment  [ ] recalculated:     Previous Nutrition Diagnosis:    [x ] Malnutrition     Nutrition Diagnosis is [ x] ongoing     New Nutrition Diagnosis: [ x] not applicable       Interventions:   Recommend  [ ] Change Diet To:  [ x] Nutrition Supplement: Recommend Ensure Enlive BID, Juevn BID, MVI/daily, Vitamin C/daily  [ ] Nutrition Support  [x ] Other:   1) Continue with current DASH/TLC diet   2) Recommend to obtain pt's updated body weight  3) RD to remain available     Monitoring and Evaluation:   [x ] PO intake [ x ] Tolerance to diet prescription [ x ] weights [ x ] labs[ x ] follow up per protocol  [ ] other:

## 2018-08-13 NOTE — PROGRESS NOTE ADULT - PROBLEM SELECTOR PLAN 1
Recommend:  Cipro 500mg PO BID  Vancomycin 1 gram IV Q-day both for 6 weeks so until 9/21  weekly cbc, bmp, ESr, Vanoc levels on Mondays faxed to [phone]    Thank you for consulting us and involving us in the management of this most interesting and challenging case.     Please Call with any further questions

## 2018-08-13 NOTE — PROGRESS NOTE ADULT - PROBLEM SELECTOR PROBLEM 7
Advanced directives, counseling/discussion

## 2018-10-24 PROCEDURE — 80053 COMPREHEN METABOLIC PANEL: CPT

## 2018-10-24 PROCEDURE — C1894: CPT

## 2018-10-24 PROCEDURE — 88304 TISSUE EXAM BY PATHOLOGIST: CPT

## 2018-10-24 PROCEDURE — 76775 US EXAM ABDO BACK WALL LIM: CPT

## 2018-10-24 PROCEDURE — 71045 X-RAY EXAM CHEST 1 VIEW: CPT

## 2018-10-24 PROCEDURE — 96365 THER/PROPH/DIAG IV INF INIT: CPT

## 2018-10-24 PROCEDURE — A9579: CPT

## 2018-10-24 PROCEDURE — 99285 EMERGENCY DEPT VISIT HI MDM: CPT | Mod: 25

## 2018-10-24 PROCEDURE — 73720 MRI LWR EXTREMITY W/O&W/DYE: CPT

## 2018-10-24 PROCEDURE — C1751: CPT

## 2018-10-24 PROCEDURE — 77001 FLUOROGUIDE FOR VEIN DEVICE: CPT

## 2018-10-24 PROCEDURE — 85652 RBC SED RATE AUTOMATED: CPT

## 2018-10-24 PROCEDURE — 76937 US GUIDE VASCULAR ACCESS: CPT

## 2018-10-24 PROCEDURE — 73630 X-RAY EXAM OF FOOT: CPT

## 2018-10-24 PROCEDURE — C1887: CPT

## 2018-10-24 PROCEDURE — 73700 CT LOWER EXTREMITY W/O DYE: CPT

## 2018-10-24 PROCEDURE — 87070 CULTURE OTHR SPECIMN AEROBIC: CPT

## 2018-10-24 PROCEDURE — 36569 INSJ PICC 5 YR+ W/O IMAGING: CPT

## 2018-10-24 PROCEDURE — 80048 BASIC METABOLIC PNL TOTAL CA: CPT

## 2018-10-24 PROCEDURE — 75820 VEIN X-RAY ARM/LEG: CPT

## 2018-10-24 PROCEDURE — 80202 ASSAY OF VANCOMYCIN: CPT

## 2018-10-24 PROCEDURE — 82962 GLUCOSE BLOOD TEST: CPT

## 2018-10-24 PROCEDURE — C1769: CPT

## 2018-10-24 PROCEDURE — 85027 COMPLETE CBC AUTOMATED: CPT

## 2018-10-24 PROCEDURE — 88311 DECALCIFY TISSUE: CPT

## 2018-10-24 PROCEDURE — 73718 MRI LOWER EXTREMITY W/O DYE: CPT

## 2018-10-24 PROCEDURE — 86140 C-REACTIVE PROTEIN: CPT

## 2018-10-24 PROCEDURE — 87186 SC STD MICRODIL/AGAR DIL: CPT

## 2019-07-15 NOTE — PROGRESS NOTE ADULT - PROBLEM SELECTOR PLAN 2
Myself/PA -Chronic disease, presently stable. Patient bedbound  -continue home med Carbadopa/Levadopa 25/100 mg PO TID  -Continue home med rasagiline (Azilect) 0.5 mg PO daily

## 2019-08-01 NOTE — DISCHARGE NOTE ADULT - PHYSICIAN SECTION COMPLETE
pt BIBEMS from home c/o abdominal pain since this AM. reports frequent BM's and voids, but denies diarrhea. denies nausea/vomiting. Yes

## 2020-01-01 NOTE — PROGRESS NOTE ADULT - ASSESSMENT
Patient is an 85 year old female admitted for cellulitis of both heels with stage 3 pressure ulcers. Detailed exam Patient is an 85 year old female admitted for cellulitis of both heels with stage 3 pressure ulcers and cellulitis, suspect left heel acute Osteomyelitis

## 2020-02-10 NOTE — PROVIDER CONTACT NOTE (OTHER) - DATE AND TIME:
13-Aug-2018 13:20
Problem: Humerus head fracture, right,    Is Patient currently treated by: [facility]     Referred by:  Keegan adamson  [place]  [facility] [place]  [phone]          
06-Aug-2018 01:05

## 2020-06-28 NOTE — ED PROVIDER NOTE - PHYSICAL EXAMINATION
Patent R heel- 1pcy4mo pressure ulcer w/ skin flap, draining sanguinous fluid, no surrounding erythema, non tender, no purulence.  L heel- 6cm x3cm pressure ulcer, w/ eschar present, no surrounding erythema, non tender, no purulence

## 2021-05-25 NOTE — ED ADULT TRIAGE NOTE - NS ED NURSE BANDS TYPE
VIDEO VISIT PROGRESS NOTE  This visit is being performed virtually.  Clinician Location: [facility]  Ft[facility]ima's Location: Work       Chief Complaint  Video Visit and Medication Refill    HISTORY OF PRESENT ILLNESS  Social Determinants  Trend Vitals        Fátima Wetzel is a pleasant 46 year old female who is requesting a Video Visit who had concerns including Video Visit and Medication Refill.    Chart reviewed, the patient saw dermatology in March and had ranitidine, cetirizine, and triamcinolone cream prescribed due to atopic dermatitis. Her last visit with her PCP was on 20 following head injury.    Asthma  - in need of refill of inhaler  - hasn't used this in awhile, several month  - has had some wheezing recently worse at night   - allergies have been bad, using benadryl a little more recently too  - no history of asthma attacks or hospitalizations  - no fevers, chills, cough    Seafood  - has an epi pen that is , maybe a few years ago and wondering if it's still okay to use?  - history of anaphylaxis in the past    COVID vaccination  - isn't sure if she is interested in this right now or not  - if she is, she thinks she would like the Ferdinand [employer] vaccine    Social history:   Social History Narrative    oldest daughter is , other kids are school age.  [place]    Tobacco, alcohol and other:  reports that she has never smoked. She has never used smokeless tobacco. She reports that she does not drink alcohol and does not use drugs.    HISTORIES  I have personally reviewed and updated the following electronic medical record sections: Current medications, Allergies and Problem list    MEDICATIONS  The medication list in the medical record as well as updates to the medication list submitted by the patient with this V-Visit were reviewed and updated today.     PHYSICAL EXAM     Vital Signs Per Patient:   Vitals - Patient Reported  Weight -  Patient Reported: 225 lb (102.1 kg)  Height - Patient Reported: 5' 9\" (175.3 cm)  BMI kg/m2: 33.23    Constitutional: well-nourished, well-developed, well-appearing  Ears, nose, mouth, throat: normocephalic, atraumatic, external ears normal by inspection  Eyes: no proptosis, extra-ocular eye movement intact, normal sclerae, conjunctivae not injected  Neck: No visible goiter, range of motion of neck appears normal  Respiratory: No increased respiratory effort  Skin: no visible rash, no foot ulcers, no varicose veins  Psychiatric: non-anxious, normal affect    ASSESSMENT AND PLAN   Fátima Wetzel is a pleasant 46 year old female we discussed the following:    Diagnoses and all orders for this visit:  Mild intermittent asthma with acute exacerbation  - will refill albuterol, use appropriate. Discussed if symptoms are worsening, would recommend visit to discuss escalating therapy and perhaps adding controller inhaler.  -     albuterol 108 (90 Base) MCG/ACT inhaler; Inhale 2 puffs into the lungs every 4 hours as needed for Shortness of Breath or Wheezing.    Allergy history, seafood  - recommended not using  epi and refill sent.  -     EPINEPHrine (Auvi-Q) 0.3 MG/0.3ML auto-injector; Inject 0.3 mLs into the muscle 1 time as needed for Anaphylaxis.    Need for vaccination  - discussed and encouraged COVID vaccination. Reassurance provided regarding potential side effects related to [employer] vaccine. Pt planning to travel this summer, encouraged vaccination prior to this.    Follow Up:   Return if symptoms worsen or fail to improve.    Upcoming Appointments Already Scheduled  No future appointments.    Blair Busch DO Family Medicine       Name band;

## 2021-12-14 NOTE — PHYSICAL THERAPY INITIAL EVALUATION ADULT - LEVEL OF INDEPENDENCE: SIT/SUPINE, REHAB EVAL
Received refill request for metronidazole.  Last visit on 2/5/18  Deny rx, patient needs appointment for further refills.   moderate assist (50% patients effort)

## 2022-05-02 NOTE — ED ADULT TRIAGE NOTE - CCCP TRG CHIEF CMPLNT
This is a historical note converted from Vicky. Formatting and pictures may have been removed.  Please reference Vicky for original formatting and attached multimedia. Chief Complaint  c/o a pulling discomfort in chest/upper abd (especially bad when yawns or takes deep breaths) experienced Thursday, and started back last night, pt states she has no other sx  Physical Exam  Vitals & Measurements  T:?36.9? ?C ?(Oral)? HR:?72?(Peripheral)? BP:?113/75? SpO2:?98%?  HT:?170.1?cm? HT:?170.1?cm? WT:?81.2?kg? WT:?81.2?kg? BMI:?28.06?  Assessment/Plan  1.?PUD (peptic ulcer disease)  ?- discussed GERD diet  - ED precautions given  - Avoid NSAIDs  - do not eat or drink after 6 pm  - f/u with PCP in 1 week  Ordered:  omeprazole, 40 mg = 1 cap(s), Oral, Daily, take in the morning 30 min before you eat or drink anything, # 30 cap(s), 0 Refill(s), Pharmacy: [id number]  Office/Outpatient Visit Level 3 Established [id number] PC, PUD (peptic ulcer disease), 08/06/17 9:35:00 CDT  ?  Orders:  XR Chest 2 Views, Routine, 08/06/17 8:58:00 CDT, Dyspnea, None, Ambulatory, Rad Type, Epigastric pain, Not Scheduled, 08/06/17 8:58:00 CDT   Problem List/Past Medical History  No chronic problems  Historical  No historical problems  Procedure/Surgical History  Tonsillectomy planned (1997).  Medications  omeprazole 40 mg oral DR capsule, 40 mg, 1 cap(s), Oral, Daily  Allergies  No Known Allergies  Social History  Alcohol  Current, 1-2 times per year  Tobacco  Never smoker  Family History  Hypertension.: Mother.      wound puncture

## 2022-12-18 NOTE — DIETITIAN INITIAL EVALUATION ADULT. - WEIGHT IN LBS
167.7 GEN: sedated and intubated  HEENT: NCAT, MMM  CV: RRR, no murmurs, 2+ radial pulses, capillary refill <2 seconds  RESP: CTAB, normal respiratory effort, good aeration throughout lung fields; no wheezing  ABD: Soft, non-distended, non-tender, normoactive BS, no HSM appreciated  MSK: no edema   NEURO: sedated  SKIN: Warm and dry, no rash

## 2024-06-13 RX ORDER — LEVOTHYROXINE SODIUM 125 MCG
0 TABLET ORAL
Qty: 0 | Refills: 0 | DISCHARGE

## 2024-06-13 RX ORDER — MIRTAZAPINE 45 MG/1
0 TABLET, ORALLY DISINTEGRATING ORAL
Qty: 0 | Refills: 0 | DISCHARGE

## 2024-06-13 RX ORDER — RIVAROXABAN 15 MG-20MG
0 KIT ORAL
Qty: 0 | Refills: 0 | DISCHARGE

## 2024-06-13 RX ORDER — ATENOLOL 25 MG/1
0 TABLET ORAL
Qty: 0 | Refills: 0 | DISCHARGE

## 2024-06-13 RX ORDER — CARBIDOPA AND LEVODOPA 25; 100 MG/1; MG/1
1 TABLET ORAL
Qty: 0 | Refills: 0 | DISCHARGE

## 2024-06-13 RX ORDER — LEVOTHYROXINE SODIUM 125 MCG
1 TABLET ORAL
Qty: 0 | Refills: 0 | DISCHARGE

## 2024-06-13 RX ORDER — CARBIDOPA AND LEVODOPA 25; 100 MG/1; MG/1
0 TABLET ORAL
Qty: 0 | Refills: 0 | DISCHARGE

## 2024-06-13 RX ORDER — RASAGILINE 0.5 MG/1
0 TABLET ORAL
Qty: 0 | Refills: 0 | DISCHARGE

## 2024-06-13 RX ORDER — SIMVASTATIN 20 MG/1
0 TABLET, FILM COATED ORAL
Qty: 0 | Refills: 0 | DISCHARGE

## 2025-01-27 NOTE — PATIENT PROFILE ADULT. - AS SC BRADEN FRICTION
-If you have a positive strep test, we will start an antibiotic. You are no longer contagious after 24 hours on the antibiotic. After 24 hours, throw away your toothbrush and use a new one.    -If you have a positive flu test, then I recommend treating the symptoms and returning to activities when fever free for 24 hours AND symptoms are improving. You are a candidate for treatment with antiflu (tamiflu) medications.    -If you have a positive RSV test, then I recommend treating the symptoms and returning to activities when fever free for 24 hours AND symptoms are improving. There is no specific treatment for this.    -If you have a positive COVID test, then I recommend treating the symptoms and returning to activities when fever free for 24 hours AND symptoms are improving. You are a candidate for treatment with COVID antiviral medications.     -  When you have a respiratory virus...?  **Stay home and away from others (including people you live with who are not sick)   **You can go back to your normal activities when, for at least 24 hours, both are true:  Your symptoms are getting better overall, and  You have not had a fever (and are not using fever-reducing medication).    When you go back to your normal activities, take added precaution over the next 5 days, such as taking additional steps for  air, hygiene, masks, physical distancing, and/or testing when you will be around other people indoors. This is especially important to protect people with factors that increase their risk of severe illness from respiratory viruses.  **Keep in mind that you may still be able to spread the virus that made you sick, even if you are feeling better. You are likely to be less contagious at this time, depending on factors like how long you were sick or how sick you were.  **If you develop a fever or you start to feel worse after you have gone back to normal activities, stay home and away from others again until, for at  least 24 hours, both are true: your symptoms are improving overall, and you have not had a fever (and are not using fever-reducing medication). Then take added precaution for the next 5 days.    https://www.cdc.gov/respiratory-viruses/prevention/precautions-when-sick.html     (1) problem

## 2025-03-10 NOTE — PATIENT PROFILE ADULT. - PRO SERVICES AT [facility]
Call Jocelyne Lopez for an intake   Start Vyvanse 20 mg every am.     Thank you for choosing [facility]. It was a pleasure to see you for your office visit today.     If you have any questions or scheduling needs during regular office hours, please call: [phone]  If urgent concerns arise after hours, you can call [phone] and ask to speak to the pediatric specialist on call.   If you need to schedule Imaging/Radiology tests, please call: [phone]  [employer] messages are for routine communication and questions and are usually answered within 48-72 hours. If you have an urgent concern or require sooner response, please call us.  Outside lab and imaging results should be faxed to [phone].  If you go to a lab outside of [facility] we will not automatically get those results. You will need to ask to have them faxed.   You may receive a survey regarding your experience with the clinic today. We would appreciate your feedback.   We encourage to you make your follow-up today to ensure a timely appointment. If you are unable to do so please reach out to [phone] as soon as possible.       If you had any blood work, imaging or other tests completed today:  Normal test results will be mailed to your home address in a letter.  Abnormal results will be communicated to you via phone call/letter.  Please allow up to 1-2 weeks for processing and interpretation of most lab work.   
unsure